# Patient Record
Sex: FEMALE | Race: WHITE | NOT HISPANIC OR LATINO | Employment: UNEMPLOYED | URBAN - METROPOLITAN AREA
[De-identification: names, ages, dates, MRNs, and addresses within clinical notes are randomized per-mention and may not be internally consistent; named-entity substitution may affect disease eponyms.]

---

## 2017-10-04 ENCOUNTER — HOSPITAL ENCOUNTER (OUTPATIENT)
Dept: PREADMISSION TESTING | Facility: HOSPITAL | Age: 57
Discharge: HOME | End: 2017-10-04
Attending: SURGERY
Payer: COMMERCIAL

## 2017-10-04 ENCOUNTER — HOSPITAL ENCOUNTER (OUTPATIENT)
Dept: RADIOLOGY | Facility: HOSPITAL | Age: 57
Discharge: HOME | End: 2017-10-04
Attending: SURGERY
Payer: COMMERCIAL

## 2017-10-04 VITALS — BODY MASS INDEX: 33.46 KG/M2 | WEIGHT: 196 LBS | HEIGHT: 64 IN

## 2017-10-04 DIAGNOSIS — K81.9 CHOLECYSTITIS: Primary | ICD-10-CM

## 2017-10-04 LAB
ALBUMIN SERPL BCP-MCNC: 3.7 G/DL
ALP SERPL-CCNC: 90 U/L
ALT SERPL W/O P-5'-P-CCNC: 46 U/L
ANION GAP SERPL CALC-SCNC: 8 MMOL/L
AST SERPL-CCNC: 34 U/L
BACTERIA #/AREA URNS HPF: NORMAL /HPF
BASOPHILS # BLD AUTO: 0 K/UL
BASOPHILS NFR BLD: 0.4 %
BILIRUB SERPL-MCNC: 0.4 MG/DL
BILIRUB UR QL STRIP: NEGATIVE
BUN SERPL-MCNC: 12 MG/DL
CALCIUM SERPL-MCNC: 9.3 MG/DL
CHLORIDE SERPL-SCNC: 105 MMOL/L
CLARITY UR: CLEAR
CO2 SERPL-SCNC: 26 MMOL/L
COLOR UR: YELLOW
CREAT SERPL-MCNC: 0.8 MG/DL
DIFFERENTIAL METHOD: ABNORMAL
EOSINOPHIL # BLD AUTO: 0.1 K/UL
EOSINOPHIL NFR BLD: 1 %
ERYTHROCYTE [DISTWIDTH] IN BLOOD BY AUTOMATED COUNT: 12.5 %
EST. GFR  (AFRICAN AMERICAN): >60 ML/MIN/1.73 M^2
EST. GFR  (NON AFRICAN AMERICAN): >60 ML/MIN/1.73 M^2
GLUCOSE SERPL-MCNC: 84 MG/DL
GLUCOSE UR QL STRIP: NEGATIVE
HCT VFR BLD AUTO: 42.4 %
HGB BLD-MCNC: 14.2 G/DL
HGB UR QL STRIP: NEGATIVE
KETONES UR QL STRIP: NEGATIVE
LEUKOCYTE ESTERASE UR QL STRIP: ABNORMAL
LYMPHOCYTES # BLD AUTO: 2.2 K/UL
LYMPHOCYTES NFR BLD: 32.8 %
MCH RBC QN AUTO: 28.5 PG
MCHC RBC AUTO-ENTMCNC: 33.4 G/DL
MCV RBC AUTO: 85 FL
MICROSCOPIC COMMENT: NORMAL
MONOCYTES # BLD AUTO: 0.5 K/UL
MONOCYTES NFR BLD: 7.2 %
NEUTROPHILS # BLD AUTO: 3.9 K/UL
NEUTROPHILS NFR BLD: 58.6 %
NITRITE UR QL STRIP: NEGATIVE
PH UR STRIP: 8 [PH] (ref 5–8)
PLATELET # BLD AUTO: 341 K/UL
PMV BLD AUTO: 7.5 FL
POTASSIUM SERPL-SCNC: 3.8 MMOL/L
PROT SERPL-MCNC: 7.9 G/DL
PROT UR QL STRIP: NEGATIVE
RBC # BLD AUTO: 4.98 M/UL
SODIUM SERPL-SCNC: 139 MMOL/L
SP GR UR STRIP: 1.01 (ref 1–1.03)
URN SPEC COLLECT METH UR: ABNORMAL
UROBILINOGEN UR STRIP-ACNC: NEGATIVE EU/DL
WBC # BLD AUTO: 6.6 K/UL
WBC #/AREA URNS HPF: 1 /HPF (ref 0–5)

## 2017-10-04 PROCEDURE — 93010 ELECTROCARDIOGRAM REPORT: CPT | Mod: ,,, | Performed by: INTERNAL MEDICINE

## 2017-10-04 PROCEDURE — 81000 URINALYSIS NONAUTO W/SCOPE: CPT

## 2017-10-04 PROCEDURE — 93005 ELECTROCARDIOGRAM TRACING: CPT

## 2017-10-04 PROCEDURE — 99900103 DSU ONLY-NO CHARGE-INITIAL HR (STAT)

## 2017-10-04 PROCEDURE — 85025 COMPLETE CBC W/AUTO DIFF WBC: CPT

## 2017-10-04 PROCEDURE — 36415 COLL VENOUS BLD VENIPUNCTURE: CPT

## 2017-10-04 PROCEDURE — 99900104 DSU ONLY-NO CHARGE-EA ADD'L HR (STAT)

## 2017-10-04 PROCEDURE — 71020 XR CHEST PA AND LATERAL PRE-OP: CPT | Mod: TC

## 2017-10-04 PROCEDURE — 71020 XR CHEST PA AND LATERAL PRE-OP: CPT | Mod: 26,,, | Performed by: RADIOLOGY

## 2017-10-04 PROCEDURE — 80053 COMPREHEN METABOLIC PANEL: CPT

## 2017-10-04 RX ORDER — ENOXAPARIN SODIUM 150 MG/ML
1 INJECTION SUBCUTANEOUS
COMMUNITY
End: 2020-03-16

## 2017-10-04 RX ORDER — PRAVASTATIN SODIUM 40 MG/1
40 TABLET ORAL DAILY
COMMUNITY
End: 2019-09-08 | Stop reason: SDUPTHER

## 2017-10-05 ENCOUNTER — ANESTHESIA EVENT (OUTPATIENT)
Dept: SURGERY | Facility: HOSPITAL | Age: 57
End: 2017-10-05
Payer: COMMERCIAL

## 2017-10-06 ENCOUNTER — ANESTHESIA (OUTPATIENT)
Dept: SURGERY | Facility: HOSPITAL | Age: 57
End: 2017-10-06
Payer: COMMERCIAL

## 2017-10-06 ENCOUNTER — HOSPITAL ENCOUNTER (OUTPATIENT)
Facility: HOSPITAL | Age: 57
Discharge: HOME | End: 2017-10-06
Attending: SURGERY | Admitting: SURGERY
Payer: COMMERCIAL

## 2017-10-06 ENCOUNTER — SURGERY (OUTPATIENT)
Age: 57
End: 2017-10-06

## 2017-10-06 DIAGNOSIS — K81.9 CHOLECYSTITIS: Primary | ICD-10-CM

## 2017-10-06 LAB
APTT BLDCRRT: 27 SEC
INR PPP: 1
PROTHROMBIN TIME: 10.1 SEC

## 2017-10-06 PROCEDURE — 99900103 DSU ONLY-NO CHARGE-INITIAL HR (STAT): Performed by: SURGERY

## 2017-10-06 PROCEDURE — 36000709 HC OR TIME LEV III EA ADD 15 MIN: Performed by: SURGERY

## 2017-10-06 PROCEDURE — 71000039 HC RECOVERY, EACH ADD'L HOUR: Performed by: SURGERY

## 2017-10-06 PROCEDURE — 25000003 PHARM REV CODE 250: Performed by: ANESTHESIOLOGY

## 2017-10-06 PROCEDURE — 36415 COLL VENOUS BLD VENIPUNCTURE: CPT

## 2017-10-06 PROCEDURE — 63600175 PHARM REV CODE 636 W HCPCS: Performed by: SURGERY

## 2017-10-06 PROCEDURE — 85610 PROTHROMBIN TIME: CPT

## 2017-10-06 PROCEDURE — 37000009 HC ANESTHESIA EA ADD 15 MINS: Performed by: SURGERY

## 2017-10-06 PROCEDURE — 47562 LAPAROSCOPIC CHOLECYSTECTOMY: CPT | Mod: 80,,, | Performed by: SURGERY

## 2017-10-06 PROCEDURE — 36000708 HC OR TIME LEV III 1ST 15 MIN: Performed by: SURGERY

## 2017-10-06 PROCEDURE — D9220A PRA ANESTHESIA: Mod: CRNA,,, | Performed by: NURSE ANESTHETIST, CERTIFIED REGISTERED

## 2017-10-06 PROCEDURE — 27201423 OPTIME MED/SURG SUP & DEVICES STERILE SUPPLY: Performed by: SURGERY

## 2017-10-06 PROCEDURE — D9220A PRA ANESTHESIA: Mod: ANES,,, | Performed by: ANESTHESIOLOGY

## 2017-10-06 PROCEDURE — 85730 THROMBOPLASTIN TIME PARTIAL: CPT

## 2017-10-06 PROCEDURE — 37000008 HC ANESTHESIA 1ST 15 MINUTES: Performed by: SURGERY

## 2017-10-06 PROCEDURE — 25000003 PHARM REV CODE 250: Performed by: NURSE ANESTHETIST, CERTIFIED REGISTERED

## 2017-10-06 PROCEDURE — 88304 TISSUE EXAM BY PATHOLOGIST: CPT | Performed by: PATHOLOGY

## 2017-10-06 PROCEDURE — 99900104 DSU ONLY-NO CHARGE-EA ADD'L HR (STAT): Performed by: SURGERY

## 2017-10-06 PROCEDURE — 71000015 HC POSTOP RECOV 1ST HR: Performed by: SURGERY

## 2017-10-06 PROCEDURE — 63600175 PHARM REV CODE 636 W HCPCS: Performed by: ANESTHESIOLOGY

## 2017-10-06 PROCEDURE — 63600175 PHARM REV CODE 636 W HCPCS: Performed by: NURSE ANESTHETIST, CERTIFIED REGISTERED

## 2017-10-06 PROCEDURE — 71000033 HC RECOVERY, INTIAL HOUR: Performed by: SURGERY

## 2017-10-06 RX ORDER — LIDOCAINE HCL/PF 100 MG/5ML
SYRINGE (ML) INTRAVENOUS
Status: DISCONTINUED | OUTPATIENT
Start: 2017-10-06 | End: 2017-10-06

## 2017-10-06 RX ORDER — FENTANYL CITRATE 50 UG/ML
25 INJECTION, SOLUTION INTRAMUSCULAR; INTRAVENOUS EVERY 5 MIN PRN
Status: COMPLETED | OUTPATIENT
Start: 2017-10-06 | End: 2017-10-06

## 2017-10-06 RX ORDER — HYDROMORPHONE HYDROCHLORIDE 2 MG/ML
0.2 INJECTION, SOLUTION INTRAMUSCULAR; INTRAVENOUS; SUBCUTANEOUS EVERY 5 MIN PRN
Status: DISCONTINUED | OUTPATIENT
Start: 2017-10-06 | End: 2017-10-06 | Stop reason: HOSPADM

## 2017-10-06 RX ORDER — LIDOCAINE HYDROCHLORIDE 10 MG/ML
1 INJECTION, SOLUTION EPIDURAL; INFILTRATION; INTRACAUDAL; PERINEURAL ONCE
Status: COMPLETED | OUTPATIENT
Start: 2017-10-06 | End: 2017-10-06

## 2017-10-06 RX ORDER — NEOSTIGMINE METHYLSULFATE 1 MG/ML
INJECTION, SOLUTION INTRAVENOUS
Status: DISCONTINUED | OUTPATIENT
Start: 2017-10-06 | End: 2017-10-06

## 2017-10-06 RX ORDER — GLYCOPYRROLATE 0.2 MG/ML
INJECTION INTRAMUSCULAR; INTRAVENOUS
Status: DISCONTINUED | OUTPATIENT
Start: 2017-10-06 | End: 2017-10-06

## 2017-10-06 RX ORDER — SODIUM CHLORIDE 0.9 % (FLUSH) 0.9 %
3 SYRINGE (ML) INJECTION
Status: DISCONTINUED | OUTPATIENT
Start: 2017-10-06 | End: 2017-10-06 | Stop reason: HOSPADM

## 2017-10-06 RX ORDER — ACETAMINOPHEN 10 MG/ML
INJECTION, SOLUTION INTRAVENOUS
Status: DISCONTINUED | OUTPATIENT
Start: 2017-10-06 | End: 2017-10-06

## 2017-10-06 RX ORDER — SODIUM CHLORIDE, SODIUM LACTATE, POTASSIUM CHLORIDE, CALCIUM CHLORIDE 600; 310; 30; 20 MG/100ML; MG/100ML; MG/100ML; MG/100ML
75 INJECTION, SOLUTION INTRAVENOUS CONTINUOUS
Status: DISCONTINUED | OUTPATIENT
Start: 2017-10-06 | End: 2017-10-06 | Stop reason: HOSPADM

## 2017-10-06 RX ORDER — ONDANSETRON 2 MG/ML
4 INJECTION INTRAMUSCULAR; INTRAVENOUS ONCE
Status: COMPLETED | OUTPATIENT
Start: 2017-10-06 | End: 2017-10-06

## 2017-10-06 RX ORDER — KETOROLAC TROMETHAMINE 30 MG/ML
INJECTION, SOLUTION INTRAMUSCULAR; INTRAVENOUS
Status: DISCONTINUED | OUTPATIENT
Start: 2017-10-06 | End: 2017-10-06

## 2017-10-06 RX ORDER — ONDANSETRON 2 MG/ML
INJECTION INTRAMUSCULAR; INTRAVENOUS
Status: DISCONTINUED | OUTPATIENT
Start: 2017-10-06 | End: 2017-10-06

## 2017-10-06 RX ORDER — DIPHENHYDRAMINE HYDROCHLORIDE 50 MG/ML
25 INJECTION INTRAMUSCULAR; INTRAVENOUS EVERY 6 HOURS PRN
Status: DISCONTINUED | OUTPATIENT
Start: 2017-10-06 | End: 2017-10-06 | Stop reason: HOSPADM

## 2017-10-06 RX ORDER — DEXAMETHASONE SODIUM PHOSPHATE 4 MG/ML
INJECTION, SOLUTION INTRA-ARTICULAR; INTRALESIONAL; INTRAMUSCULAR; INTRAVENOUS; SOFT TISSUE
Status: DISCONTINUED | OUTPATIENT
Start: 2017-10-06 | End: 2017-10-06

## 2017-10-06 RX ORDER — MIDAZOLAM HYDROCHLORIDE 1 MG/ML
INJECTION, SOLUTION INTRAMUSCULAR; INTRAVENOUS
Status: DISCONTINUED | OUTPATIENT
Start: 2017-10-06 | End: 2017-10-06

## 2017-10-06 RX ORDER — PROPOFOL 10 MG/ML
VIAL (ML) INTRAVENOUS
Status: DISCONTINUED | OUTPATIENT
Start: 2017-10-06 | End: 2017-10-06

## 2017-10-06 RX ORDER — MEPERIDINE HYDROCHLORIDE 25 MG/ML
12.5 INJECTION INTRAMUSCULAR; INTRAVENOUS; SUBCUTANEOUS ONCE AS NEEDED
Status: DISCONTINUED | OUTPATIENT
Start: 2017-10-06 | End: 2017-10-06 | Stop reason: HOSPADM

## 2017-10-06 RX ORDER — SODIUM CHLORIDE, SODIUM LACTATE, POTASSIUM CHLORIDE, CALCIUM CHLORIDE 600; 310; 30; 20 MG/100ML; MG/100ML; MG/100ML; MG/100ML
INJECTION, SOLUTION INTRAVENOUS CONTINUOUS
Status: DISCONTINUED | OUTPATIENT
Start: 2017-10-06 | End: 2017-10-06 | Stop reason: HOSPADM

## 2017-10-06 RX ORDER — KETAMINE HYDROCHLORIDE 100 MG/ML
INJECTION, SOLUTION INTRAMUSCULAR; INTRAVENOUS
Status: DISCONTINUED | OUTPATIENT
Start: 2017-10-06 | End: 2017-10-06

## 2017-10-06 RX ORDER — VECURONIUM BROMIDE FOR INJECTION 1 MG/ML
INJECTION, POWDER, LYOPHILIZED, FOR SOLUTION INTRAVENOUS
Status: DISCONTINUED | OUTPATIENT
Start: 2017-10-06 | End: 2017-10-06

## 2017-10-06 RX ORDER — OXYCODONE HYDROCHLORIDE 5 MG/1
5 TABLET ORAL ONCE AS NEEDED
Status: COMPLETED | OUTPATIENT
Start: 2017-10-07 | End: 2017-10-06

## 2017-10-06 RX ORDER — OXYCODONE HYDROCHLORIDE 5 MG/1
5 TABLET ORAL ONCE AS NEEDED
Status: DISCONTINUED | OUTPATIENT
Start: 2017-10-06 | End: 2017-10-06 | Stop reason: HOSPADM

## 2017-10-06 RX ORDER — FENTANYL CITRATE 50 UG/ML
INJECTION, SOLUTION INTRAMUSCULAR; INTRAVENOUS
Status: DISCONTINUED | OUTPATIENT
Start: 2017-10-06 | End: 2017-10-06

## 2017-10-06 RX ORDER — OXYCODONE HYDROCHLORIDE 5 MG/1
5 TABLET ORAL ONCE
Status: CANCELLED | OUTPATIENT
Start: 2017-10-06

## 2017-10-06 RX ADMIN — SODIUM CHLORIDE, SODIUM LACTATE, POTASSIUM CHLORIDE, AND CALCIUM CHLORIDE: .6; .31; .03; .02 INJECTION, SOLUTION INTRAVENOUS at 10:10

## 2017-10-06 RX ADMIN — GLYCOPYRROLATE 0.4 MG: 0.2 INJECTION, SOLUTION INTRAMUSCULAR; INTRAVENOUS at 01:10

## 2017-10-06 RX ADMIN — FENTANYL CITRATE 25 MCG: 50 INJECTION, SOLUTION INTRAMUSCULAR; INTRAVENOUS at 02:10

## 2017-10-06 RX ADMIN — DEXAMETHASONE SODIUM PHOSPHATE 8 MG: 4 INJECTION, SOLUTION INTRAMUSCULAR; INTRAVENOUS at 12:10

## 2017-10-06 RX ADMIN — SODIUM CHLORIDE, SODIUM LACTATE, POTASSIUM CHLORIDE, AND CALCIUM CHLORIDE: .6; .31; .03; .02 INJECTION, SOLUTION INTRAVENOUS at 01:10

## 2017-10-06 RX ADMIN — LIDOCAINE HYDROCHLORIDE 10 MG: 10 INJECTION, SOLUTION EPIDURAL; INFILTRATION; INTRACAUDAL; PERINEURAL at 09:10

## 2017-10-06 RX ADMIN — FENTANYL CITRATE 50 MCG: 50 INJECTION INTRAMUSCULAR; INTRAVENOUS at 12:10

## 2017-10-06 RX ADMIN — GLYCOPYRROLATE 0.2 MG: 0.2 INJECTION, SOLUTION INTRAMUSCULAR; INTRAVENOUS at 12:10

## 2017-10-06 RX ADMIN — MIDAZOLAM 2 MG: 1 INJECTION INTRAMUSCULAR; INTRAVENOUS at 12:10

## 2017-10-06 RX ADMIN — KETOROLAC TROMETHAMINE 30 MG: 30 INJECTION, SOLUTION INTRAMUSCULAR; INTRAVENOUS at 01:10

## 2017-10-06 RX ADMIN — PIPERACILLIN SODIUM AND TAZOBACTAM SODIUM 3.38 G: 3; .375 INJECTION, POWDER, LYOPHILIZED, FOR SOLUTION INTRAVENOUS at 12:10

## 2017-10-06 RX ADMIN — PROPOFOL 160 MG: 10 INJECTION, EMULSION INTRAVENOUS at 12:10

## 2017-10-06 RX ADMIN — LIDOCAINE HYDROCHLORIDE 100 MG: 20 INJECTION, SOLUTION INTRAVENOUS at 12:10

## 2017-10-06 RX ADMIN — KETAMINE HYDROCHLORIDE 30 MG: 100 INJECTION, SOLUTION, CONCENTRATE INTRAMUSCULAR; INTRAVENOUS at 12:10

## 2017-10-06 RX ADMIN — OXYCODONE HYDROCHLORIDE 5 MG: 5 TABLET ORAL at 02:10

## 2017-10-06 RX ADMIN — OXYCODONE HYDROCHLORIDE 5 MG: 5 TABLET ORAL at 03:10

## 2017-10-06 RX ADMIN — ONDANSETRON 4 MG: 2 INJECTION INTRAMUSCULAR; INTRAVENOUS at 02:10

## 2017-10-06 RX ADMIN — ONDANSETRON 4 MG: 2 INJECTION, SOLUTION INTRAMUSCULAR; INTRAVENOUS at 12:10

## 2017-10-06 RX ADMIN — ACETAMINOPHEN 1000 MG: 10 INJECTION, SOLUTION INTRAVENOUS at 12:10

## 2017-10-06 RX ADMIN — NEOSTIGMINE METHYLSULFATE 5 MG: 1 INJECTION INTRAVENOUS at 01:10

## 2017-10-06 RX ADMIN — VECURONIUM BROMIDE FOR INJECTION 8 MG: 1 INJECTION, POWDER, LYOPHILIZED, FOR SOLUTION INTRAVENOUS at 12:10

## 2017-10-06 NOTE — ANESTHESIA PREPROCEDURE EVALUATION
10/06/2017  Jessica Mitchell is a 57 y.o., female.    Anesthesia Evaluation    I have reviewed the Patient Summary Reports.    I have reviewed the Nursing Notes.   I have reviewed the Medications.     Review of Systems      Physical Exam  General:  Obesity    Airway/Jaw/Neck:  Airway Findings: Mouth Opening: Normal Tongue: Normal  General Airway Assessment: Adult, Good  Mallampati: II  Improves to II with phonation.  TM Distance: 4-6 cm      Dental:  Dental Findings: In tact   Chest/Lungs:  Chest/Lungs Findings: Clear to auscultation, Normal Respiratory Rate     Heart/Vascular:  Heart Findings: Rate: Normal  Rhythm: Regular Rhythm  Sounds: Normal  Heart murmur: negative       Mental Status:  Mental Status Findings:  Cooperative, Alert and Oriented         Anesthesia Plan  Type of Anesthesia, risks & benefits discussed:  Anesthesia Type:  general  Patient's Preference:   Intra-op Monitoring Plan: standard ASA monitors  Intra-op Monitoring Plan Comments:   Post Op Pain Control Plan:   Post Op Pain Control Plan Comments:   Induction:   IV  Beta Blocker:  Patient is not currently on a Beta-Blocker (No further documentation required).       Informed Consent: Patient understands risks and agrees with Anesthesia plan.  Questions answered. Anesthesia consent signed with patient.  ASA Score: 2     Day of Surgery Review of History & Physical: I have interviewed and examined the patient. I have reviewed the patient's H&P dated:  There are no significant changes.          Ready For Surgery From Anesthesia Perspective.

## 2017-10-06 NOTE — TRANSFER OF CARE
"Anesthesia Transfer of Care Note    Patient: Jessica Mitchell    Procedure(s) Performed: Procedure(s) (LRB):  CHOLECYSTECTOMY-LAPAROSCOPIC (N/A)    Patient location: PACU    Anesthesia Type: general    Transport from OR: Transported from OR on room air with adequate spontaneous ventilation    Post pain: adequate analgesia    Post assessment: no apparent anesthetic complications and tolerated procedure well    Post vital signs: stable    Level of consciousness: sedated    Nausea/Vomiting: no nausea/vomiting    Complications: none    Transfer of care protocol was followed      Last vitals:   Visit Vitals  /77 (BP Location: Left arm, Patient Position: Lying)   Pulse 76   Temp 36.8 °C (98.2 °F) (Temporal)   Resp 18   Ht 5' 4" (1.626 m)   Wt 88.9 kg (196 lb)   LMP 10/06/2012   SpO2 96%   Breastfeeding? No   BMI 33.64 kg/m²     "

## 2017-10-06 NOTE — DISCHARGE INSTRUCTIONS
"    Print                                                                      Discharge Instructions: After Your Surgery/Procedure    Youve just had surgery. During surgery you were given medicine called anesthesia to keep you relaxed and free of pain. After surgery you may have some pain or nausea. This is common. Here are some tips for feeling better and getting well after surgery.     Stay on schedule with your medication.   Going home  Your doctor or nurse will show you how to take care of yourself when you go home. He or she will also answer your questions. Have an adult family member or friend drive you home.      For your safety we recommend these precaution for the first 24 hours after your procedure:    · Do not drive or use heavy equipment.  · Do not make important decisions or sign legal papers.  · Do not drink alcohol.  · Have someone stay with you, if needed. He or she can watch for problems and help keep you safe.  · Your concentration, balance, coordination, and judgement may be impaired for many hours after anesthesia.  Use caution when ambulating or standing up.     · You may feel weak and "washed out" after anesthesia and surgery.      Subtle residual effects of general anesthesia or sedation with regional / local anesthesia can last more than 24 hours.  Rest for the remainder of the day or longer if your Doctor/Surgeon has advised you to do so.  Although you may feel normal within the first 24 hours, your reflexes and mental ability may be impaired without you realizing it.  You may feel dizzy, lightheaded or sleepy for 24 hours or longer.      Be sure to go to all follow-up visits with your doctor. And rest after your surgery for as long as your doctor tells you to.  Coping with pain  If you have pain after surgery, pain medicine will help you feel better. Take it as told, before pain becomes severe. Also, ask your doctor or pharmacist about other ways to control pain. This might be with heat, " ice, or relaxation. And follow any other instructions your surgeon or nurse gives you.    Tips for taking pain medicine  To get the best relief possible, remember these points:  · Pain medicines can upset your stomach. Taking them with a little food may help.  · Most pain relievers taken by mouth need at least 20 to 30 minutes to start to work.  · Taking medicine on a schedule can help you remember to take it. Try to time your medicine so that you can take it before starting an activity. This might be before you get dressed, go for a walk, or sit down for dinner.  · Constipation is a common side effect of pain medicines. Call your doctor before taking any medicines such as laxatives or stool softeners to help ease constipation. Also ask if you should skip any foods. Drinking lots of fluids and eating foods such as fruits and vegetables that are high in fiber can also help. Remember, do not take laxatives unless your surgeon has prescribed them.  · Drinking alcohol and taking pain medicine can cause dizziness and slow your breathing. It can even be deadly. Do not drink alcohol while taking pain medicine.  · Pain medicine can make you react more slowly to things. Do not drive or run machinery while taking pain medicine.  Your health care provider may tell you to take acetaminophen to help ease your pain. Ask him or her how much you are supposed to take each day. Acetaminophen or other pain relievers may interact with your prescription medicines or other over-the-counter (OTC) drugs. Some prescription medicines have acetaminophen and other ingredients. Using both prescription and OTC acetaminophen for pain can cause you to overdose. Read the labels on your OTC medicines with care. This will help you to clearly know the list of ingredients, how much to take, and any warnings. It may also help you not take too much acetaminophen. If you have questions or do not understand the information, ask your pharmacist or health  care provider to explain it to you before you take the OTC medicine.  Managing nausea  Some people have an upset stomach after surgery. This is often because of anesthesia, pain, or pain medicine, or the stress of surgery. These tips will help you handle nausea and eat healthy foods as you get better. If you were on a special food plan before surgery, ask your doctor if you should follow it while you get better. These tips may help:  · Do not push yourself to eat. Your body will tell you when to eat and how much.  · Start off with clear liquids and soup. They are easier to digest.  · Next try semi-solid foods, such as mashed potatoes, applesauce, and gelatin, as you feel ready.  · Slowly move to solid foods. Dont eat fatty, rich, or spicy foods at first.  · Do not force yourself to have 3 large meals a day. Instead eat smaller amounts more often.  · Take pain medicines with a small amount of solid food, such as crackers or toast, to avoid nausea.     Call your surgeon if  · You still have pain an hour after taking medicine. The medicine may not be strong enough.  · You feel too sleepy, dizzy, or groggy. The medicine may be too strong.  · You have side effects like nausea, vomiting, or skin changes, such as rash, itching, or hives.       If you have obstructive sleep apnea  You were given anesthesia medicine during surgery to keep you comfortable and free of pain. After surgery, you may have more apnea spells because of this medicine and other medicines you were given. The spells may last longer than usual.   At home:  · Keep using the continuous positive airway pressure (CPAP) device when you sleep. Unless your health care provider tells you not to, use it when you sleep, day or night. CPAP is a common device used to treat obstructive sleep apnea.  · Talk with your provider before taking any pain medicine, muscle relaxants, or sedatives. Your provider will tell you about the possible dangers of taking these  medicines.  © 5327-7989 [employer]. [address] [place] [address], [place] [address]. All rights reserved. This information is not intended as a substitute for professional medical care. Always follow your healthcare professional's instructions.    General Information:    1.  Do not drink alcoholic beverages including beer for 24 hours or as long as you are on pain medication..  2.  Do not drive a motor vehicle, operate machinery or power tools, or signs legal papers for 24 hours or as long as you are on pain medication.   3.  You may experience light-headedness, dizziness, and sleepiness following surgery. Please do not stay alone. A responsible adult should be with you for this 24 hour period.  4.  Go home and rest.    5. Progress slowly to a normal diet unless instructed.  Otherwise, begin with liquids such as soft drinks, then soup and crackers working up to solid foods. Drink plenty of nonalcoholic fluids.  6.  Certain anesthetics and pain medications produce nausea and vomiting in certain       individuals. If nausea becomes a problem at home, call you doctor.    7. A nurse will be calling you sometime after surgery. Do not be alarmed. This is our way of finding out how you are doing.    8. Several times every hour while you are awake, take 2-3 deep breaths and cough. If you had stomach surgery hold a pillow or rolled towel firmly against your stomach before you cough. This will help with any pain the cough might cause.  9. Several times every hour while you are awake, pump and flex your feet 5-6 times and do foot circles. This will help prevent blood clots.    10.Call your doctor for severe pain, bleeding, fever, or signs or symptoms of infection (pain, swelling, redness, foul odor, drainage).    Using Opioids for Pain Management     Your doctor has given instructions for you to take an opioid.  This is a drug for bad pain.  It helps control pain without causing bleeding and kidney problems.  Common  opioid names are morphine, hydromorphone, oxycodone, and methadone. These drugs are called narcotics.    There are several safety concerns you need to know.     · It is against the law to give or sell this drug to another person.  You must keep this medicine safely locked.    · You may have side effects from taking this medication.  These include nausea, itching, sweating, sleepiness, a change in your ability to breathe, and depression.  · Do not take alcohol or sleeping pills opioids.    · Long-term opoid use may no longer giver you relief from pain.  It can cause you stomach pain, mental anxiety, and headaches.  Long-term opoid use can potentially lead to unlawful street drug abuse and reduce your ability to stay employed.    · Your body may become opioid tolerant if you need to take more to get relief.    · You must stop taking opioids if you begin having more pain as a result of the medicine.    · Opioid withdrawal occurs when you have to stop taking the drug.  It can cause you to have nausea, vomiting, diarrhea, stomach pain, anxiety, and dilated pupils in your eyes. This condition means you are opioid dependent.    · Addiction is a drug induced brain disease. It means there are changes in how your brain is working.  Children, teens, and young adults under 25 years old are more likely to get addicted to opioids.      · Addiction can happen with repeated opioid use.  It does not happen with short-term use of two weeks or less.      For more information, please speak with your doctor or pharmacist.        Post op instructions for prevention of DVT  What is deep vein thrombosis?  Deep vein thrombosis (DVT) is the medical term for blood clots in the deep veins of the leg.  These blood clots can be dangerous.  A DVT can block a blood vessel and keep blood from getting where it needs to go.  Another problem is that the clot can travel to other parts of the body such as the lungs.  A clot that travels to the lungs is  called a pulmonary embolus (PE) and can cause serious problems with breathing which can lead to death.  Am I at risk for DVT/PE?  If you are not very active, you are at risk of DVT.  Anyone confined to bed, sitting for long periods of time, recovering from surgery, etc. increases the risk of DVT.  Other risk factors are cancer diagnosis, certain medications, estrogen replacement in any form,older age, obesity, pregnancy, smoking, history of clotting disorders, and dehydration.  How will I know if I have a DVT?   Swelling in the lower leg   Pain   Warmth, redness, hardness or bulging of the vein  If you have any of these symptoms, call your doctors office right away.  Some people will not have any symptoms until the clot moves to the lungs.  What are the symptoms of a PE?   Panting, shortness of breath, or trouble breathing   Sharp, knife-like chest pain when you breathe   Coughing or coughing up blood   Rapid heartbeat  If you have any of these symptoms or get worse quickly, call 911 for emergency treatment.  How can I prevent a DVT?   Avoid long periods of inactivity and dont cross your legs--get up and walk around every hour or so.   Stay active--walking after surgery is highly encouraged.  This means you should get out of the house and walk in the neighborhood.  Going up and down stairs will not impair healing (unless advised against such activity by your doctor).     Drink plenty of noncaffeinated, nonalcoholic fluids each day to prevent dehydration.   Wear special support stockings, if they have been advised by your doctor.   If you travel, stop at least once an hour and walk around.   Avoid smoking (assistance with stopping is available through your healthcare provider)      Discharge Instructions for Laparoscopic Cholecystectomy  You have had a procedure known as a laparoscopic cholecystectomy. A laparoscopic cholecystectomy is a procedure to remove your gallbladder. People who have this  procedure usually recover more quickly and have less pain than with open gallbladder surgery (called open cholecystectomy). Many surgeons recommend a low-fat diet, avoiding fried food in particular, for the first month after surgery.   You can live a full and healthy life without your gallbladder. This includes eating the foods and doing the things you enjoyed before your gallbladder problems started.  Home care  Recommendations for home care include the following:   · Ask someone to drive you to your appointments for the next 3 days. Dont drive until you are no longer taking pain medicine and are able to step on the brake pedal without hesitation.   · Wash the skin around your incision daily with mild soap and water. It's OK to shower the day after your surgery.  · Eat your regular diet. It is wise to stay away from rich, greasy, or spicy food for a few days.  · Remember, it takes at least 1 week for you to get most of your strength and energy back.  · Make an office visit to talk to your healthcare provider if the following symptoms dont go away within a week after your surgery:  ¨ Fatigue  ¨ Pain around the incision  ¨ Diarrhea or constipation  ¨ Loss of appetite     When to call your healthcare provider  Call your healthcare provider immediately if you have any of the following:  · Yellowing of your eyes or skin (jaundice)  · Chills  · Fever of 100.4°F (38.0°C) or higher, or as directed by your healthcare provider   · Redness, swelling, increasing pain, pus, or a foul smell at the incision site  · Dark or rust-colored urine  · Stool that is marley-colored or light in color instead of brown  · Increasing belly pain  · Rectal bleeding  · Leg swelling or shortness of breath   Date Last Reviewed: 7/1/2016  © 0028-9764 [employer]. [address] [place] [address], [place] [address]. All rights reserved. This information is not intended as a substitute for professional medical care. Always follow your healthcare  professional's instructions.        Always notify your doctor if you are not able to follow the post operative instructions that are given to you at the time of discharge.  It may be necessary to prescribe one of the medications available to prevent DVT.We hope your stay was comfortable as you heal now, mend and rest.    For we have enjoyed taking care of you by giving your our best.    And as you get better, by regaining your health and strength;   We count it as a privilege to have served you and hope your time at [facility] was well spent.      Thank  You!!!

## 2017-10-06 NOTE — ANESTHESIA POSTPROCEDURE EVALUATION
"Anesthesia Post Evaluation    Patient: Jessica Mitchell    Procedure(s) Performed: Procedure(s) (LRB):  CHOLECYSTECTOMY-LAPAROSCOPIC (N/A)    Final Anesthesia Type: general  Patient location during evaluation: PACU  Patient participation: Yes- Able to Participate  Level of consciousness: awake and alert and oriented  Post-procedure vital signs: reviewed and stable  Pain management: adequate  Airway patency: patent  PONV status at discharge: No PONV  Anesthetic complications: no      Cardiovascular status: blood pressure returned to baseline  Respiratory status: unassisted, spontaneous ventilation and room air  Hydration status: euvolemic  Follow-up not needed.        Visit Vitals  BP (!) 140/75   Pulse 72   Temp 36.5 °C (97.7 °F) (Temporal)   Resp 16   Ht 5' 4" (1.626 m)   Wt 88.9 kg (196 lb)   LMP 10/06/2012   SpO2 (!) 93%   Breastfeeding? No   BMI 33.64 kg/m²       Pain/Greg Score: Pain Assessment Performed: Yes (10/6/2017  2:00 PM)  Presence of Pain: non-verbal indicators absent (10/6/2017  2:00 PM)  Pain Rating Prior to Med Admin: 9 (10/6/2017  2:25 PM)  Greg Score: 5 (10/6/2017  2:00 PM)      "

## 2017-10-06 NOTE — OR NURSING
Pt tolerated procedure well. Pt states no complaints. Pt and family given discharge instructions with verbalized understanding. Pt escorted via w/c to car.

## 2017-10-06 NOTE — BRIEF OP NOTE
[facility]  Brief Operative Note     SUMMARY     Surgery Date: 10/6/2017     Surgeon(s) and Role:     * Lubna Roland MD - Primary     * Mariel Nagel MD - Assisting        Pre-op Diagnosis:  Cholecystitis [K81.9]    Post-op Diagnosis:  Post-Op Diagnosis Codes:     * Biliary colic [K80.50]    Procedure(s) (LRB):  CHOLECYSTECTOMY-LAPAROSCOPIC (N/A)    Anesthesia: General    Description of the findings of the procedure: Adhesions of omentum to GB    Findings/Key Components: SEE ABOVE    Estimated Blood Loss: * No values recorded between 10/6/2017 12:51 PM and 10/6/2017  1:56 PM *         Specimens:   Specimen (12h ago through future)    Start     Ordered    10/06/17 1309  Specimen to Pathology - Surgery  Once     Comments:  Pre-op Diagnosis: Cholecystitis [K81.9]Post-op Diagnosis: sameProcedure(s):CHOLECYSTECTOMY-LAPAROSCOPIC Number of specimens: 1Name of specimens: gallbladder      10/06/17 1309          Discharge Note    SUMMARY     Admit Date: 10/6/2017    Discharge Date and Time:  10/06/2017 1:57 PM    Hospital Course (synopsis of major diagnoses, care, treatment, and services provided during the course of the hospital stay): **Uneventful*     Final Diagnosis: Post-Op Diagnosis Codes:     * Biliary colic [K80.50]    Disposition: Home or Self Care    Follow Up/Patient Instructions:     Medications:  Reconciled Home Medications:   Current Discharge Medication List      CONTINUE these medications which have NOT CHANGED    Details   amlodipine (NORVASC) 5 MG tablet Take 5 mg by mouth once daily.      cyclobenzaprine (FLEXERIL) 10 MG tablet Take 10 mg by mouth 3 (three) times daily as needed for Muscle spasms.      enoxaparin (LOVENOX) 150 mg/mL Syrg Inject 1 mg/kg into the skin every 12 (twelve) hours.      lorazepam (ATIVAN) 1 MG tablet Take 1 mg by mouth every 12 (twelve) hours as needed for Anxiety.      pravastatin (PRAVACHOL) 40 MG tablet Take 40 mg by mouth once daily.      ranitidine  (ZANTAC) 300 MG tablet Take 300 mg by mouth once daily.      warfarin (COUMADIN) 2 MG tablet Take 9 mg by mouth Daily.          STOP taking these medications       tramadol (ULTRAM) 50 mg tablet Comments:   Reason for Stopping:         econazole nitrate 1 % cream Comments:   Reason for Stopping:         triamcinolone acetonide 0.1% (KENALOG) 0.1 % cream Comments:   Reason for Stopping:               Discharge Procedure Orders  Diet general     Lifting restrictions   Order Comments: No heavy lifting for 3 weeks     Call MD for:  temperature >100.4     Call MD for:  persistent nausea and vomiting or diarrhea     Call MD for:  severe uncontrolled pain     Call MD for:  redness, tenderness, or signs of infection (pain, swelling, redness, odor or green/yellow discharge around incision site)     Remove dressing in 48 hours       Follow-up Information     Lubna Roland MD. Schedule an appointment as soon as possible for a visit in 1 week.    Specialty:  General Surgery  Why:  For wound re-check  Contact information:  [address]  [facility] [id number] [phone]

## 2017-10-06 NOTE — OP NOTE
DATE OF PROCEDURE:  10/06/2017    PREOPERATIVE DIAGNOSES:  1.  Cholelithiasis.  2.  Biliary colic.    POSTOPERATIVE DIAGNOSES:  1.  Cholelithiasis.  2.  Biliary colic.  3.  Intra-abdominal adhesions.    PROCEDURES:  1.  Laparoscopic cholecystectomy.  2.  Intra-abdominal adhesiolysis.    SURGEON:  Dr. Roland.    FIRST ASSISTANT:  Robe.    ANESTHESIA:  General endotracheal.    ESTIMATED BLOOD LOSS:  5 mL    DRAINS:  None.    COMPLICATIONS:  None.    PROCEDURE IN DETAIL:  The patient was brought to the Operating Room where she   was placed on the operating room table in the supine position.  Following   general endotracheal anesthesia, the patient's abdomen was prepped and draped in   sterile fashion using chlorhexidine prep.  A transverse supraumbilical incision   was made and carried down to the subcutaneous tissue.  Bleeding was controlled   by use of electrocautery.  The S retractors were used to separate the   subcutaneous tissue down to the fascia and the fascia was incised.  Stay sutures   using 0 Vicryl were placed on the superior and inferior fascial edges.  With   upward retraction, the peritoneum was grasped and entered.  The Emma trocar   was then placed and the abdomen was insufflated to approximately 4 liters.  The   scope was then advanced and the tip of the gallbladder could be identified.  She   was then placed in the reverse Trendelenburg position and rotated to her left.    The 2 lateral 5 mm trocars were placed under direct visualization with no   bleeding.  The gallbladder was grasped and upper traction was applied.  She had   adhesions to the gallbladder and these will be addressed later.  Following this,   the upper 10 mm trocar was placed after positioning with the spinal needle.    The dolphin nose dissector and electrocautery was then used to take down the   adhesions of the omentum on the anterior surface of the gallbladder.  The   inferior portion of the gallbladder was then grasped  with the second grasper and   upward traction applied.  The cystic duct was cleared and down to the   bifurcation and it was then clipped twice distally, once proximally and   transected.  The cystic artery was then clipped twice distally, once proximally   and transected.  Then, the gallbladder was removed from the liver bed using the   spatula and electrocautery.  It was then placed in an EndoCatch bag and brought   out through the upper 10 mm trocar site.  Following this, the liver bed was   elevated and a few areas that were oozing were cauterized using the spatula.    The pressure was then dropped to 7 and there was no bleeding or bile leakage   from the liver bed.  Two pieces of Surgicel were then placed in the liver bed   and the omentum fell back into the gallbladder fossa.  Following this, the   trocars were removed under direct visualization with no bleeding from any of the   trocars.  The two 10 mm trocar sites, the fascia was closed with interrupted 0   Vicryl sutures.  The skin of all 4 incisions was then closed with running 4-0   Monocryl subcuticular suture.  All counts were reported as correct x2 prior to   closing the incisions.  An abdominal binder was placed on the patient and the   patient was awakened from anesthesia and taken to the Recovery Room in stable   condition.      BINDU  dd: 10/06/2017 14:03:05 (CDT)  td: 10/06/2017 15:03:50 (CDRONNIE)  Doc ID   #[id number]  Job ID #[id number]    CC:

## 2017-10-09 VITALS
WEIGHT: 196 LBS | RESPIRATION RATE: 18 BRPM | TEMPERATURE: 98 F | DIASTOLIC BLOOD PRESSURE: 74 MMHG | SYSTOLIC BLOOD PRESSURE: 136 MMHG | OXYGEN SATURATION: 93 % | HEART RATE: 81 BPM | HEIGHT: 64 IN | BODY MASS INDEX: 33.46 KG/M2

## 2019-09-07 DIAGNOSIS — E78.5 HYPERLIPIDEMIA: Primary | ICD-10-CM

## 2019-09-08 RX ORDER — PRAVASTATIN SODIUM 40 MG/1
40 TABLET ORAL DAILY
Qty: 30 TABLET | Refills: 0 | Status: SHIPPED | OUTPATIENT
Start: 2019-09-08 | End: 2019-09-24 | Stop reason: SDUPTHER

## 2019-09-11 DIAGNOSIS — M54.31 SCIATICA OF RIGHT SIDE: Primary | ICD-10-CM

## 2019-09-11 RX ORDER — TRAMADOL HYDROCHLORIDE 50 MG/1
50 TABLET ORAL EVERY 6 HOURS
COMMUNITY
End: 2019-09-11 | Stop reason: SDUPTHER

## 2019-09-11 RX ORDER — TRAMADOL HYDROCHLORIDE 50 MG/1
50 TABLET ORAL EVERY 6 HOURS
Qty: 30 TABLET | Refills: 0 | Status: SHIPPED | OUTPATIENT
Start: 2019-09-11 | End: 2019-09-24 | Stop reason: SDUPTHER

## 2019-09-11 NOTE — TELEPHONE ENCOUNTER
----- Message from Akila De La Cruz sent at 9/11/2019  1:00 PM CDT -----  - pt needs refill on tramadol   [facility] [address]  [phone]

## 2019-09-17 ENCOUNTER — TELEPHONE (OUTPATIENT)
Dept: FAMILY MEDICINE | Facility: CLINIC | Age: 59
End: 2019-09-17

## 2019-09-17 NOTE — TELEPHONE ENCOUNTER
----- Message from Akila De La Cruz sent at 9/17/2019 11:53 AM CDT -----  - pt would like an /s set up to check on her thyroid nodules. She is having throat trouble.   [phone]

## 2019-09-24 ENCOUNTER — OFFICE VISIT (OUTPATIENT)
Dept: FAMILY MEDICINE | Facility: CLINIC | Age: 59
End: 2019-09-24
Payer: COMMERCIAL

## 2019-09-24 VITALS
SYSTOLIC BLOOD PRESSURE: 144 MMHG | WEIGHT: 206 LBS | HEIGHT: 64 IN | DIASTOLIC BLOOD PRESSURE: 78 MMHG | BODY MASS INDEX: 35.17 KG/M2 | HEART RATE: 72 BPM

## 2019-09-24 DIAGNOSIS — E04.1 THYROID NODULE: ICD-10-CM

## 2019-09-24 DIAGNOSIS — K21.9 GASTROESOPHAGEAL REFLUX DISEASE, ESOPHAGITIS PRESENCE NOT SPECIFIED: ICD-10-CM

## 2019-09-24 DIAGNOSIS — J02.9 PHARYNGITIS, UNSPECIFIED ETIOLOGY: ICD-10-CM

## 2019-09-24 DIAGNOSIS — Z23 NEED FOR INFLUENZA VACCINATION: ICD-10-CM

## 2019-09-24 DIAGNOSIS — B37.9 CANDIDIASIS: ICD-10-CM

## 2019-09-24 DIAGNOSIS — H65.03 BILATERAL ACUTE SEROUS OTITIS MEDIA, RECURRENCE NOT SPECIFIED: ICD-10-CM

## 2019-09-24 DIAGNOSIS — E78.2 MIXED HYPERLIPIDEMIA: ICD-10-CM

## 2019-09-24 DIAGNOSIS — M54.31 SCIATICA OF RIGHT SIDE: ICD-10-CM

## 2019-09-24 DIAGNOSIS — I10 HYPERTENSION, UNSPECIFIED TYPE: Primary | ICD-10-CM

## 2019-09-24 PROCEDURE — 99214 PR OFFICE/OUTPT VISIT, EST, LEVL IV, 30-39 MIN: ICD-10-PCS | Mod: 25,S$GLB,, | Performed by: NURSE PRACTITIONER

## 2019-09-24 PROCEDURE — 90682 FLU VACCINE - QUADRIVALENT (RECOMBINANT) PRESERVATIVE FREE: ICD-10-PCS | Mod: S$GLB,,, | Performed by: NURSE PRACTITIONER

## 2019-09-24 PROCEDURE — 90471 IMMUNIZATION ADMIN: CPT | Mod: S$GLB,,, | Performed by: NURSE PRACTITIONER

## 2019-09-24 PROCEDURE — 99214 OFFICE O/P EST MOD 30 MIN: CPT | Mod: 25,S$GLB,, | Performed by: NURSE PRACTITIONER

## 2019-09-24 PROCEDURE — 90682 RIV4 VACC RECOMBINANT DNA IM: CPT | Mod: S$GLB,,, | Performed by: NURSE PRACTITIONER

## 2019-09-24 PROCEDURE — 90471 FLU VACCINE - QUADRIVALENT (RECOMBINANT) PRESERVATIVE FREE: ICD-10-PCS | Mod: S$GLB,,, | Performed by: NURSE PRACTITIONER

## 2019-09-24 RX ORDER — CEPHALEXIN 500 MG/1
500 CAPSULE ORAL EVERY 8 HOURS
Qty: 21 CAPSULE | Refills: 0 | Status: SHIPPED | OUTPATIENT
Start: 2019-09-24 | End: 2019-10-01

## 2019-09-24 RX ORDER — PRAVASTATIN SODIUM 40 MG/1
40 TABLET ORAL DAILY
Qty: 30 TABLET | Refills: 0 | Status: SHIPPED | OUTPATIENT
Start: 2019-09-24 | End: 2019-11-13 | Stop reason: SDUPTHER

## 2019-09-24 RX ORDER — TRAMADOL HYDROCHLORIDE 50 MG/1
50 TABLET ORAL EVERY 12 HOURS PRN
Qty: 60 TABLET | Refills: 0 | Status: SHIPPED | OUTPATIENT
Start: 2019-09-24 | End: 2019-10-24

## 2019-09-24 RX ORDER — FLUCONAZOLE 100 MG/1
100 TABLET ORAL DAILY
Qty: 3 TABLET | Refills: 0 | Status: SHIPPED | OUTPATIENT
Start: 2019-09-24 | End: 2019-09-27

## 2019-09-24 RX ORDER — AMLODIPINE BESYLATE 5 MG/1
5 TABLET ORAL DAILY
Qty: 90 TABLET | Refills: 1 | Status: SHIPPED | OUTPATIENT
Start: 2019-09-24 | End: 2020-05-26 | Stop reason: SDUPTHER

## 2019-09-24 RX ORDER — DULOXETIN HYDROCHLORIDE 30 MG/1
CAPSULE, DELAYED RELEASE ORAL
Refills: 3 | COMMUNITY
Start: 2019-09-19 | End: 2020-03-02 | Stop reason: SDUPTHER

## 2019-09-24 RX ORDER — APIXABAN 5 MG/1
1 TABLET, FILM COATED ORAL 2 TIMES DAILY
Refills: 5 | COMMUNITY
Start: 2019-09-02 | End: 2019-11-30 | Stop reason: SDUPTHER

## 2019-09-24 NOTE — PROGRESS NOTES
Patient ID: Jessica Mitchell is a 59 y.o. female.    Chief Complaint: Sore Throat; Cough; and Thyroid Problem (maybe the nodules)    HPI  Presents with complaints of sore throat that started about 10 days ago. Does not seem to be getting better. Pain is constant worse with swallowing. Has been taking pain meds to help with pain. Does suffer from gerd. Mild congestion. No fever. No cough. But does have some ear pressure. No longer on coumadin. Currently taking eliquis. Had thyroid ultrasound in 2017 which showed  2 thyroid nodules.       Past Medical History:   Diagnosis Date    Adrenal tumor     DVT (deep venous thrombosis) 2012    Hiatal hernia     HTN (hypertension)     Multiple thyroid nodules     two    Stomach ulcer     Stroke     at 31 years old     Past Surgical History:   Procedure Laterality Date    Bilatweral Tubal ligation      ESOPHAGEAL DILATION           Tobacco History:  reports that she has quit smoking. She has never used smokeless tobacco.      Review of patient's allergies indicates:   Allergen Reactions    Codeine Itching    Lisinopril Other (See Comments)     cough       Current Outpatient Medications:     amLODIPine (NORVASC) 5 MG tablet, Take 1 tablet (5 mg total) by mouth once daily., Disp: 90 tablet, Rfl: 1    cyclobenzaprine (FLEXERIL) 10 MG tablet, Take 10 mg by mouth 3 (three) times daily as needed for Muscle spasms., Disp: , Rfl:     DULoxetine (CYMBALTA) 30 MG capsule, TAKE ONE CAPSULE BY MOUTH ONCE DAILY FOR 2 WEEKS THEN INCREASE TO 60MG (2 CAPSULES) DAILY IF TOLERATED, Disp: , Rfl: 3    ELIQUIS 5 mg Tab, Take 1 tablet by mouth 2 (two) times daily., Disp: , Rfl: 5    enoxaparin (LOVENOX) 150 mg/mL Syrg, Inject 1 mg/kg into the skin every 12 (twelve) hours., Disp: , Rfl:     pravastatin (PRAVACHOL) 40 MG tablet, Take 1 tablet (40 mg total) by mouth once daily., Disp: 30 tablet, Rfl: 0    ranitidine (ZANTAC) 300 MG tablet, Take 300 mg by mouth once daily., Disp: , Rfl:  "    traMADol (ULTRAM) 50 mg tablet, Take 1 tablet (50 mg total) by mouth every 12 (twelve) hours as needed for Pain., Disp: 60 tablet, Rfl: 0    cephALEXin (KEFLEX) 500 MG capsule, Take 1 capsule (500 mg total) by mouth every 8 (eight) hours. for 7 days, Disp: 21 capsule, Rfl: 0    fluconazole (DIFLUCAN) 100 MG tablet, Take 1 tablet (100 mg total) by mouth once daily. for 3 days, Disp: 3 tablet, Rfl: 0    lorazepam (ATIVAN) 1 MG tablet, Take 1 mg by mouth every 12 (twelve) hours as needed for Anxiety., Disp: , Rfl:     warfarin (COUMADIN) 2 MG tablet, Take 9 mg by mouth Daily. , Disp: , Rfl:     Review of Systems   Constitutional: Negative for chills, fever and unexpected weight change.   HENT: Positive for sore throat. Negative for ear pain and rhinorrhea.    Eyes: Negative for pain and visual disturbance.   Respiratory: Negative for cough and shortness of breath.    Cardiovascular: Negative for chest pain, palpitations and leg swelling.   Gastrointestinal: Negative for abdominal pain, diarrhea, nausea and vomiting.   Genitourinary: Negative for difficulty urinating, hematuria and vaginal bleeding.   Musculoskeletal: Negative for arthralgias.   Skin: Negative for rash.   Neurological: Negative for dizziness, weakness and headaches.   Psychiatric/Behavioral: Negative for agitation and sleep disturbance. The patient is not nervous/anxious.           Objective:      Vitals:    09/24/19 1048   BP: (!) 144/78   Pulse: 72   Weight: 93.4 kg (206 lb)   Height: 5' 3.5" (1.613 m)     Physical Exam   Constitutional: She is oriented to person, place, and time. She appears well-developed and well-nourished.   HENT:   Head: Normocephalic.   Right Ear: External ear normal.   Left Ear: External ear normal.   Mouth/Throat: Oropharynx is clear and moist.   Erythematous   White patches to tongue and posterior pharynx   Eyes: Pupils are equal, round, and reactive to light. Conjunctivae are normal.   Neck: Normal range of motion. " Neck supple. No JVD present. Thyroid mass (nodules palpated) present.   Cardiovascular: Normal rate and regular rhythm.   No murmur heard.  Pulmonary/Chest: Effort normal and breath sounds normal.   Abdominal: Soft. Bowel sounds are normal. There is no tenderness.   Musculoskeletal: Normal range of motion. She exhibits no edema or deformity.   Lymphadenopathy:     She has no cervical adenopathy.   Neurological: She is alert and oriented to person, place, and time. Gait normal.   Skin: Skin is warm, dry and intact. No rash noted.   Psychiatric: She has a normal mood and affect. Her speech is normal and behavior is normal.         Assessment:       1. Hypertension, unspecified type    2. Hyperlipidemia    3. Thyroid nodule    4. Need for influenza vaccination    5. Pharyngitis, unspecified etiology    6. i    7. Bilateral acute serous otitis media, recurrence not specified    8. Candidiasis    9. Sciatica of right side           Plan:       Hypertension, unspecified type  -     amLODIPine (NORVASC) 5 MG tablet; Take 1 tablet (5 mg total) by mouth once daily.  Dispense: 90 tablet; Refill: 1    Hyperlipidemia  -     pravastatin (PRAVACHOL) 40 MG tablet; Take 1 tablet (40 mg total) by mouth once daily.  Dispense: 30 tablet; Refill: 0    Thyroid nodule  -     US Thyroid; Future; Expected date: 09/24/2019    Need for influenza vaccination  -     Influenza - Quadrivalent (Recombinant) (PF)    Pharyngitis, unspecified etiology  -     cephALEXin (KEFLEX) 500 MG capsule; Take 1 capsule (500 mg total) by mouth every 8 (eight) hours. for 7 days  Dispense: 21 capsule; Refill: 0    i  Comments:  inrease zantac to 300mg bid x 5 days    Bilateral acute serous otitis media, recurrence not specified    Candidiasis  -     fluconazole (DIFLUCAN) 100 MG tablet; Take 1 tablet (100 mg total) by mouth once daily. for 3 days  Dispense: 3 tablet; Refill: 0    Sciatica of right side  -     traMADol (ULTRAM) 50 mg tablet; Take 1 tablet (50 mg  total) by mouth every 12 (twelve) hours as needed for Pain.  Dispense: 60 tablet; Refill: 0      No follow-ups on file.        9/24/2019 Angi Barrett NP

## 2019-09-24 NOTE — PATIENT INSTRUCTIONS
Viral Pharyngitis (Sore Throat)    You (or your child, if your child is the patient) have pharyngitis (sore throat). This infection is caused by a virus. It can cause throat pain that is worse when swallowing, aching all over, headache, and fever. The infection may be spread by coughing, kissing, or touching others after touching your mouth or nose. Antibiotic medications do not work against viruses, so they are not used for treating this condition.  Home care  · If your symptoms are severe, rest at home. Return to work or school when you feel well enough.   · Drink plenty of fluids to avoid dehydration.  · For children: Use acetaminophen for fever, fussiness or discomfort. In infants over six months of age, you may use ibuprofen instead of acetaminophen. (NOTE: If your child has chronic liver or kidney disease or ever had a stomach ulcer or GI bleeding, talk with your doctor before using these medicines.) (NOTE: Aspirin should never be used in anyone under 18 years of age who is ill with a fever. It may cause severe liver damage.)   · For adults: You may use acetaminophen or ibuprofen to control pain or fever, unless another medicine was prescribed for this. (NOTE: If you have chronic liver or kidney disease or ever had a stomach ulcer or GI bleeding, talk with your doctor before using these medicines.)  · Throat lozenges or numbing throat sprays can help reduce pain. Gargling with warm salt water will also help reduce throat pain. For this, dissolve 1/2 teaspoon of salt in 1 glass of warm water. To help soothe a sore throat, children can sip on juice or a popsicle. Children 5 years and older can also suck on a lollipop or hard candy.  · Avoid salty or spicy foods, which can be irritating to the throat.  Follow-up care  Follow up with your healthcare provider or our staff if you are not improving over the next week.  When to seek medical advice  Call your healthcare provider right away if any of these  occur:  · Fever as directed by your doctor.  For children, seek care if:  ¨ Your child is of any age and has repeated fevers above 104°F (40°C).  ¨ Your child is younger than 2 years of age and has a fever of 100.4°F (38°C) that continues for more than 1 day.  ¨ Your child is 2 years old or older and has a fever of 100.4°F (38°C) that continues for more than 3 days.  · New or worsening ear pain, sinus pain, or headache  · Painful lumps in the back of neck  · Stiff neck  · Lymph nodes are getting larger  · Inability to swallow liquids, excessive drooling, or inability to open mouth wide due to throat pain  · Signs of dehydration (very dark urine or no urine, sunken eyes, dizziness)  · Trouble breathing or noisy breathing  · Muffled voice  · New rash  · Child appears to be getting sicker  Date Last Reviewed: 4/13/2015  © 7615-4147 [employer]. [address] [place] [address], [place] [address]. All rights reserved. This information is not intended as a substitute for professional medical care. Always follow your healthcare professional's instructions.

## 2019-09-25 ENCOUNTER — TELEPHONE (OUTPATIENT)
Dept: FAMILY MEDICINE | Facility: CLINIC | Age: 59
End: 2019-09-25

## 2019-09-25 NOTE — TELEPHONE ENCOUNTER
I started her on antibiotics that should help with infection. Did the imaging have anything for tomorrow? If not Monday ok

## 2019-09-25 NOTE — TELEPHONE ENCOUNTER
----- Message from Cleopatra Harden sent at 9/25/2019 12:23 PM CDT -----  Contact: Jessica  The patient saw Angi yesterday. She has a lump in her throat. She forgot to tell Angi she can taste the pus when she swallows. She has an U/S Monday. Does she want it to be sooner or keep it for  Monday.She can not do it Friday. pts # [phone] GH

## 2019-09-25 NOTE — TELEPHONE ENCOUNTER
Spoke with pt and let her know that the abx would help with the infection. Pt states she will go have the imaging done on Monday.

## 2019-09-30 ENCOUNTER — HOSPITAL ENCOUNTER (OUTPATIENT)
Dept: RADIOLOGY | Facility: HOSPITAL | Age: 59
Discharge: HOME | End: 2019-09-30
Attending: NURSE PRACTITIONER
Payer: COMMERCIAL

## 2019-09-30 DIAGNOSIS — E04.1 THYROID NODULE: ICD-10-CM

## 2019-09-30 PROCEDURE — 76536 US EXAM OF HEAD AND NECK: CPT | Mod: TC,PO

## 2019-10-01 ENCOUNTER — TELEPHONE (OUTPATIENT)
Dept: FAMILY MEDICINE | Facility: CLINIC | Age: 59
End: 2019-10-01

## 2019-10-01 NOTE — TELEPHONE ENCOUNTER
----- Message from Angi Barrett NP sent at 9/30/2019 11:16 PM CDT -----  Thyroid ultrasound shows multiple nodules essentially unchanged since 2016. Repeat in 1 year.They do see minimally enlarged lymph nodes. We will recheck these at office visit in 2 weeks.

## 2019-10-01 NOTE — TELEPHONE ENCOUNTER
Spoke to patient with information from Angi. Remind me creaaated for ultrasound in 1 year. She doesn't have an office visit in 2 weeks. Do you want her to schedule one?

## 2019-10-14 ENCOUNTER — TELEPHONE (OUTPATIENT)
Dept: FAMILY MEDICINE | Facility: CLINIC | Age: 59
End: 2019-10-14

## 2019-10-14 DIAGNOSIS — J02.9 PHARYNGITIS, UNSPECIFIED ETIOLOGY: Primary | ICD-10-CM

## 2019-10-14 NOTE — TELEPHONE ENCOUNTER
----- Message from Cleopatra Harden sent at 10/14/2019 10:20 AM CDT -----  Contact: Jessica  The patient had to ramone  her apt with Angi on Wednesday. She made an apt for October 29h with Angi. Her throat is still hurting her. She has more pain medications left. Should she take them . Should she be on more antibiotics.[facility] [address]. Please call and let her know .pts # [phone] GH

## 2019-11-12 DIAGNOSIS — K21.9 GASTROESOPHAGEAL REFLUX DISEASE, ESOPHAGITIS PRESENCE NOT SPECIFIED: Primary | ICD-10-CM

## 2019-11-12 NOTE — TELEPHONE ENCOUNTER
----- Message from Anne Bull sent at 11/12/2019  8:33 AM CST -----  Contact: Jessica Mitchell  Needs a refill on her Ranitidine 300MG tablet  Send to [facility]oy  Pt# [phone]

## 2019-11-13 ENCOUNTER — TELEPHONE (OUTPATIENT)
Dept: FAMILY MEDICINE | Facility: CLINIC | Age: 59
End: 2019-11-13

## 2019-11-13 DIAGNOSIS — E78.2 MIXED HYPERLIPIDEMIA: ICD-10-CM

## 2019-11-13 DIAGNOSIS — K21.9 GASTROESOPHAGEAL REFLUX DISEASE, ESOPHAGITIS PRESENCE NOT SPECIFIED: Primary | ICD-10-CM

## 2019-11-13 RX ORDER — PRAVASTATIN SODIUM 40 MG/1
40 TABLET ORAL DAILY
Qty: 30 TABLET | Refills: 11 | Status: SHIPPED | OUTPATIENT
Start: 2019-11-13 | End: 2020-11-04 | Stop reason: SDUPTHER

## 2019-11-13 RX ORDER — FAMOTIDINE 20 MG/1
20 TABLET, FILM COATED ORAL 2 TIMES DAILY
Qty: 60 TABLET | Refills: 11 | Status: SHIPPED | OUTPATIENT
Start: 2019-11-13 | End: 2020-07-06

## 2019-11-13 NOTE — TELEPHONE ENCOUNTER
----- Message from Ann Moreau sent at 11/13/2019 12:15 PM CST -----  Pt needs a refill for Ranitidine 300 mg, Pravastatin 40 mg. [facility] [address].

## 2019-11-13 NOTE — TELEPHONE ENCOUNTER
----- Message from Anne Bull sent at 11/12/2019  8:40 AM CST -----  Contact: Jessica Mitchell  Patient left another message. She needs Nurse miihr to give her a call back soon please in regards to some papers.   Pt# [phone]

## 2019-12-01 RX ORDER — APIXABAN 5 MG/1
5 TABLET, FILM COATED ORAL 2 TIMES DAILY
Qty: 60 TABLET | Refills: 1 | Status: SHIPPED | OUTPATIENT
Start: 2019-12-01 | End: 2020-01-27 | Stop reason: SDUPTHER

## 2019-12-18 ENCOUNTER — TELEPHONE (OUTPATIENT)
Dept: FAMILY MEDICINE | Facility: CLINIC | Age: 59
End: 2019-12-18

## 2019-12-18 DIAGNOSIS — Z79.899 ENCOUNTER FOR LONG-TERM (CURRENT) USE OF OTHER MEDICATIONS: Primary | ICD-10-CM

## 2019-12-18 DIAGNOSIS — E78.5 HYPERLIPIDEMIA, UNSPECIFIED HYPERLIPIDEMIA TYPE: ICD-10-CM

## 2019-12-18 NOTE — TELEPHONE ENCOUNTER
----- Message from Anne Bull sent at 12/18/2019  2:03 PM CST -----  Contact: Jessica Mitchell  Vm- patient is returning nurse call. I could barley understand the who message because her phone connection was bad.  Pt# [phone]

## 2019-12-30 ENCOUNTER — OFFICE VISIT (OUTPATIENT)
Dept: FAMILY MEDICINE | Facility: CLINIC | Age: 59
End: 2019-12-30
Payer: COMMERCIAL

## 2019-12-30 VITALS
WEIGHT: 203.63 LBS | SYSTOLIC BLOOD PRESSURE: 136 MMHG | DIASTOLIC BLOOD PRESSURE: 78 MMHG | BODY MASS INDEX: 34.76 KG/M2 | HEIGHT: 64 IN | HEART RATE: 92 BPM

## 2019-12-30 DIAGNOSIS — F41.9 ANXIETY DISORDER, UNSPECIFIED TYPE: ICD-10-CM

## 2019-12-30 DIAGNOSIS — D68.9 COAGULATION DISORDER: ICD-10-CM

## 2019-12-30 DIAGNOSIS — K21.9 GASTROESOPHAGEAL REFLUX DISEASE, ESOPHAGITIS PRESENCE NOT SPECIFIED: ICD-10-CM

## 2019-12-30 DIAGNOSIS — I10 HYPERTENSION, UNSPECIFIED TYPE: ICD-10-CM

## 2019-12-30 DIAGNOSIS — E78.5 HYPERLIPIDEMIA, UNSPECIFIED HYPERLIPIDEMIA TYPE: ICD-10-CM

## 2019-12-30 DIAGNOSIS — N62 LARGE BREASTS: Primary | ICD-10-CM

## 2019-12-30 DIAGNOSIS — Z79.01 LONG TERM (CURRENT) USE OF ANTICOAGULANTS: ICD-10-CM

## 2019-12-30 PROCEDURE — 3078F DIAST BP <80 MM HG: CPT | Mod: S$GLB,,, | Performed by: NURSE PRACTITIONER

## 2019-12-30 PROCEDURE — 3008F PR BODY MASS INDEX (BMI) DOCUMENTED: ICD-10-PCS | Mod: S$GLB,,, | Performed by: NURSE PRACTITIONER

## 2019-12-30 PROCEDURE — 3075F SYST BP GE 130 - 139MM HG: CPT | Mod: S$GLB,,, | Performed by: NURSE PRACTITIONER

## 2019-12-30 PROCEDURE — 3075F PR MOST RECENT SYSTOLIC BLOOD PRESS GE 130-139MM HG: ICD-10-PCS | Mod: S$GLB,,, | Performed by: NURSE PRACTITIONER

## 2019-12-30 PROCEDURE — 3078F PR MOST RECENT DIASTOLIC BLOOD PRESSURE < 80 MM HG: ICD-10-PCS | Mod: S$GLB,,, | Performed by: NURSE PRACTITIONER

## 2019-12-30 PROCEDURE — 99214 OFFICE O/P EST MOD 30 MIN: CPT | Mod: S$GLB,,, | Performed by: NURSE PRACTITIONER

## 2019-12-30 PROCEDURE — 3008F BODY MASS INDEX DOCD: CPT | Mod: S$GLB,,, | Performed by: NURSE PRACTITIONER

## 2019-12-30 PROCEDURE — 99214 PR OFFICE/OUTPT VISIT, EST, LEVL IV, 30-39 MIN: ICD-10-PCS | Mod: S$GLB,,, | Performed by: NURSE PRACTITIONER

## 2019-12-30 RX ORDER — AMLODIPINE BESYLATE 5 MG/1
TABLET ORAL
COMMUNITY
Start: 2018-08-17 | End: 2020-03-16

## 2019-12-30 RX ORDER — TRAMADOL HYDROCHLORIDE 50 MG/1
TABLET ORAL
COMMUNITY
Start: 2018-08-17 | End: 2019-12-30 | Stop reason: SDUPTHER

## 2019-12-30 RX ORDER — TRAMADOL HYDROCHLORIDE 50 MG/1
TABLET ORAL
Qty: 60 TABLET | Refills: 0 | Status: SHIPPED | OUTPATIENT
Start: 2019-12-30 | End: 2020-01-02 | Stop reason: RX

## 2019-12-30 RX ORDER — CYCLOBENZAPRINE HCL 10 MG
10 TABLET ORAL 3 TIMES DAILY PRN
Qty: 90 TABLET | Refills: 0 | Status: SHIPPED | OUTPATIENT
Start: 2019-12-30 | End: 2020-01-27 | Stop reason: SDUPTHER

## 2019-12-30 NOTE — PROGRESS NOTES
SUBJECTIVE:    Patient ID: Jessica Mitchell is a 59 y.o. female.    Chief Complaint: Follow-up (has bottles /VM)    59 year old female presents for check up. Reports overall feeling ok. Reflux symptoms seem to be better. Recently switched to pepcid due to zantac recall. Sleeps ok. Suffers daily from neck pain. Does have large breasts and was told that the neck pain was due to weight of breasts. Patient would like to discuss surgical options for reduction.       No visits with results within 6 Month(s) from this visit.   Latest known visit with results is:   Admission on 10/06/2017, Discharged on 10/06/2017   Component Date Value Ref Range Status    aPTT 10/06/2017 27.0  21.0 - 32.0 sec Final    Prothrombin Time 10/06/2017 10.1  9.0 - 12.5 sec Final    INR 10/06/2017 1.0  0.8 - 1.2 Final       Past Medical History:   Diagnosis Date    Adrenal tumor     DVT (deep venous thrombosis) 2012    Hiatal hernia     HTN (hypertension)     Multiple thyroid nodules     two    Stomach ulcer     Stroke     at 31 years old     Past Surgical History:   Procedure Laterality Date    Bilatweral Tubal ligation      ESOPHAGEAL DILATION       No family history on file.    Marital Status:   Alcohol History:  reports that she drinks alcohol.  Tobacco History:  reports that she has quit smoking. She has never used smokeless tobacco.  Drug History:  reports that she does not use drugs.    Review of patient's allergies indicates:   Allergen Reactions    Codeine Itching    Codeine sulfate      Other reaction(s): Unknown    Lisinopril Other (See Comments)     cough  Other reaction(s): Unknown       Current Outpatient Medications:     amLODIPine (NORVASC) 5 MG tablet, Take 1 tablet (5 mg total) by mouth once daily., Disp: 90 tablet, Rfl: 1    amLODIPine (NORVASC) 5 MG tablet, 1 tablet, Disp: , Rfl:     apixaban (ELIQUIS) 5 mg Tab, as directed, Disp: 180 tablet, Rfl: 1    cyclobenzaprine (FLEXERIL) 10 MG tablet, Take 1  tablet (10 mg total) by mouth 3 (three) times daily as needed for Muscle spasms., Disp: 90 tablet, Rfl: 0    DULoxetine (CYMBALTA) 30 MG capsule, TAKE ONE CAPSULE BY MOUTH ONCE DAILY FOR 2 WEEKS THEN INCREASE TO 60MG (2 CAPSULES) DAILY IF TOLERATED, Disp: , Rfl: 3    famotidine (PEPCID) 20 MG tablet, Take 1 tablet (20 mg total) by mouth 2 (two) times daily., Disp: 60 tablet, Rfl: 11    warfarin (COUMADIN) 2 MG tablet, Take 9 mg by mouth Daily. , Disp: , Rfl:     DULoxetine 30 mg CDRS, duloxetine 30 mg capsule,delayed release  TAKE ONE CAPSULE BY MOUTH ONCE DAILY FOR 2 WEEKS, THEN INCREASE TO 60MG (2 CAPSULES) DAILY IF TOLERATED, Disp: , Rfl:     ELIQUIS 5 mg Tab, Take 1 tablet (5 mg total) by mouth 2 (two) times daily., Disp: 60 tablet, Rfl: 1    enoxaparin (LOVENOX) 150 mg/mL Syrg, Inject 1 mg/kg into the skin every 12 (twelve) hours., Disp: , Rfl:     lorazepam (ATIVAN) 1 MG tablet, Take 1 mg by mouth every 12 (twelve) hours as needed for Anxiety., Disp: , Rfl:     pravastatin (PRAVACHOL) 40 MG tablet, Take 1 tablet (40 mg total) by mouth once daily., Disp: 30 tablet, Rfl: 11    traMADol (ULTRAM) 50 mg tablet, Take 1 tablet (50 mg total) by mouth every 6 (six) hours., Disp: 60 tablet, Rfl: 0    Review of Systems   Constitutional: Negative for chills, fever and unexpected weight change.   HENT: Negative for ear pain, rhinorrhea and sore throat.    Eyes: Negative for pain and visual disturbance.   Respiratory: Negative for cough and shortness of breath.    Cardiovascular: Negative for chest pain, palpitations and leg swelling.   Gastrointestinal: Negative for abdominal pain, diarrhea, nausea and vomiting.   Genitourinary: Negative for difficulty urinating, hematuria and vaginal bleeding.   Musculoskeletal: Positive for neck pain. Negative for arthralgias.   Skin: Negative for rash.   Neurological: Negative for dizziness, weakness and headaches.   Psychiatric/Behavioral: Negative for agitation and sleep  "disturbance. The patient is not nervous/anxious.           Objective:      Vitals:    12/30/19 1021 12/30/19 1022   BP: (!) 148/90 136/78   Pulse: 92    Weight: 92.4 kg (203 lb 9.6 oz)    Height: 5' 3.5" (1.613 m)      Body mass index is 35.5 kg/m².  Physical Exam   Constitutional: She is oriented to person, place, and time. She appears well-developed and well-nourished.   HENT:   Right Ear: External ear normal.   Left Ear: External ear normal.   Mouth/Throat: Oropharynx is clear and moist.   Eyes: Pupils are equal, round, and reactive to light. Conjunctivae are normal.   Neck: Normal range of motion. Neck supple. No JVD present.   Cardiovascular: Normal rate and regular rhythm.   No murmur heard.  Pulmonary/Chest: Effort normal and breath sounds normal.   Abdominal: Soft. Bowel sounds are normal.   Musculoskeletal: Normal range of motion. She exhibits no edema or deformity.        Cervical back: She exhibits tenderness and spasm. She exhibits no bony tenderness.   Lymphadenopathy:     She has no cervical adenopathy.   Neurological: She is alert and oriented to person, place, and time. Gait normal.   Skin: Skin is warm, dry and intact. No rash noted.   Psychiatric: She has a normal mood and affect. Her speech is normal and behavior is normal.         Assessment:       1. Large breasts    2. Hypertension, unspecified type    3. Gastroesophageal reflux disease, esophagitis presence not specified    4. Long term (current) use of anticoagulants    5. Coagulation disorder    6. Anxiety disorder, unspecified type    7. Hyperlipidemia, unspecified hyperlipidemia type         Plan:       Large breasts  -     Ambulatory Referral to Plastic Surgery    Hypertension, unspecified type    Gastroesophageal reflux disease, esophagitis presence not specified    Long term (current) use of anticoagulants    Coagulation disorder    Anxiety disorder, unspecified type    Hyperlipidemia, unspecified hyperlipidemia type    Other orders  -  "    cyclobenzaprine (FLEXERIL) 10 MG tablet; Take 1 tablet (10 mg total) by mouth 3 (three) times daily as needed for Muscle spasms.  Dispense: 90 tablet; Refill: 0  -     Discontinue: traMADol (ULTRAM) 50 mg tablet; 1 tablet as needed q12 prn  Dispense: 60 tablet; Refill: 0  -     apixaban (ELIQUIS) 5 mg Tab; as directed  Dispense: 180 tablet; Refill: 1      Follow up in about 3 months (around 3/30/2020).

## 2020-01-02 RX ORDER — TRAMADOL HYDROCHLORIDE 50 MG/1
50 TABLET ORAL EVERY 6 HOURS
Qty: 60 TABLET | Refills: 0 | Status: SHIPPED | OUTPATIENT
Start: 2020-01-02 | End: 2020-03-16 | Stop reason: SDUPTHER

## 2020-01-02 NOTE — TELEPHONE ENCOUNTER
----- Message from Fabio Bragg sent at 1/2/2020 12:36 PM CST -----  Pt says [facility] [address] she is saying that the tramadol that was called they are out of stock. Pt wants the to up the dose to the highest dose after 50 mg   Pt [phone]

## 2020-01-27 RX ORDER — CYCLOBENZAPRINE HCL 10 MG
10 TABLET ORAL 3 TIMES DAILY PRN
Qty: 90 TABLET | Refills: 0 | Status: SHIPPED | OUTPATIENT
Start: 2020-01-27 | End: 2020-03-16 | Stop reason: SDUPTHER

## 2020-01-27 RX ORDER — APIXABAN 5 MG/1
5 TABLET, FILM COATED ORAL 2 TIMES DAILY
Qty: 180 TABLET | Refills: 1 | Status: SHIPPED | OUTPATIENT
Start: 2020-01-27 | End: 2020-07-06 | Stop reason: SDUPTHER

## 2020-01-27 NOTE — TELEPHONE ENCOUNTER
----- Message from Sarah Bryant sent at 1/27/2020  9:23 AM CST -----  Patient needs a refill of flexeril and eliquis [facility] to [facility] [email or web address] call back number [phone]

## 2020-03-02 RX ORDER — DULOXETIN HYDROCHLORIDE 30 MG/1
CAPSULE, DELAYED RELEASE ORAL
Qty: 30 CAPSULE | Refills: 2 | Status: SHIPPED | OUTPATIENT
Start: 2020-03-02 | End: 2020-05-26 | Stop reason: SDUPTHER

## 2020-03-02 NOTE — TELEPHONE ENCOUNTER
Spoke with pt - wants to know if someone will write the Cymbalta for her that Dr. Arita was writing for her. She cannot afford to pay the specialists copay right now and they are refusing to give her a refill until she is seen. She has an appt with Angi cantu and states she has already been out of it for 2 weeks.

## 2020-03-02 NOTE — TELEPHONE ENCOUNTER
----- Message from Fabio Bragg sent at 3/2/2020 11:51 AM CST -----  Pt is needing a call back about a medication   Pt [phone]

## 2020-03-12 ENCOUNTER — TELEPHONE (OUTPATIENT)
Dept: FAMILY MEDICINE | Facility: CLINIC | Age: 60
End: 2020-03-12

## 2020-03-12 NOTE — TELEPHONE ENCOUNTER
----- Message from Anne Bull sent at 3/12/2020  4:11 PM CDT -----  Contact: Sherine trotter/ [facility] says she needs current med list on the pt right now to be faxed over /ASAP  Fax# [phone]  Tel:# [phone]

## 2020-03-13 ENCOUNTER — TELEPHONE (OUTPATIENT)
Dept: FAMILY MEDICINE | Facility: CLINIC | Age: 60
End: 2020-03-13

## 2020-03-13 NOTE — TELEPHONE ENCOUNTER
Called and left detailed message on machine for pt. I also scheduled pt with ANA PAULA Monday @ 10:40. I told pt to call us back if she could not make that appt.

## 2020-03-13 NOTE — TELEPHONE ENCOUNTER
----- Message from Anne Bull sent at 3/13/2020  9:17 AM CDT -----  Contact: Jessica Mitchell  Pt was treated for a mini stroke LTI . She was rushed to the ER and the only reason she came home was because they had no where to put her, the hospital was full. She says she is on eliquis and her concern was why was she having a mini stroke when she is already on a blood thinner. She says she needs to be seen by next week for an ER follow up . She would also like to know if she should add an aspirin to her eliquis.    PT# [phone]

## 2020-03-16 ENCOUNTER — TELEPHONE (OUTPATIENT)
Dept: FAMILY MEDICINE | Facility: CLINIC | Age: 60
End: 2020-03-16

## 2020-03-16 ENCOUNTER — HOSPITAL ENCOUNTER (OUTPATIENT)
Dept: RADIOLOGY | Facility: HOSPITAL | Age: 60
Discharge: HOME | End: 2020-03-16
Attending: NURSE PRACTITIONER
Payer: COMMERCIAL

## 2020-03-16 ENCOUNTER — OFFICE VISIT (OUTPATIENT)
Dept: FAMILY MEDICINE | Facility: CLINIC | Age: 60
End: 2020-03-16
Payer: COMMERCIAL

## 2020-03-16 VITALS
DIASTOLIC BLOOD PRESSURE: 82 MMHG | HEIGHT: 64 IN | SYSTOLIC BLOOD PRESSURE: 126 MMHG | BODY MASS INDEX: 34.83 KG/M2 | HEART RATE: 88 BPM | WEIGHT: 204 LBS

## 2020-03-16 DIAGNOSIS — R10.9 SIDE PAIN: ICD-10-CM

## 2020-03-16 DIAGNOSIS — K21.9 GASTROESOPHAGEAL REFLUX DISEASE, ESOPHAGITIS PRESENCE NOT SPECIFIED: ICD-10-CM

## 2020-03-16 DIAGNOSIS — M54.50 LOW BACK PAIN, UNSPECIFIED BACK PAIN LATERALITY, UNSPECIFIED CHRONICITY, UNSPECIFIED WHETHER SCIATICA PRESENT: Primary | ICD-10-CM

## 2020-03-16 DIAGNOSIS — R10.11 RIGHT UPPER QUADRANT ABDOMINAL PAIN: ICD-10-CM

## 2020-03-16 DIAGNOSIS — E78.5 HYPERLIPIDEMIA, UNSPECIFIED HYPERLIPIDEMIA TYPE: ICD-10-CM

## 2020-03-16 DIAGNOSIS — I10 HYPERTENSION, UNSPECIFIED TYPE: ICD-10-CM

## 2020-03-16 DIAGNOSIS — F41.9 ANXIETY DISORDER, UNSPECIFIED TYPE: ICD-10-CM

## 2020-03-16 PROCEDURE — 3079F PR MOST RECENT DIASTOLIC BLOOD PRESSURE 80-89 MM HG: ICD-10-PCS | Mod: S$GLB,,, | Performed by: NURSE PRACTITIONER

## 2020-03-16 PROCEDURE — 3008F BODY MASS INDEX DOCD: CPT | Mod: S$GLB,,, | Performed by: NURSE PRACTITIONER

## 2020-03-16 PROCEDURE — 99214 OFFICE O/P EST MOD 30 MIN: CPT | Mod: S$GLB,,, | Performed by: NURSE PRACTITIONER

## 2020-03-16 PROCEDURE — 3008F PR BODY MASS INDEX (BMI) DOCUMENTED: ICD-10-PCS | Mod: S$GLB,,, | Performed by: NURSE PRACTITIONER

## 2020-03-16 PROCEDURE — 3074F PR MOST RECENT SYSTOLIC BLOOD PRESSURE < 130 MM HG: ICD-10-PCS | Mod: S$GLB,,, | Performed by: NURSE PRACTITIONER

## 2020-03-16 PROCEDURE — 99214 PR OFFICE/OUTPT VISIT, EST, LEVL IV, 30-39 MIN: ICD-10-PCS | Mod: S$GLB,,, | Performed by: NURSE PRACTITIONER

## 2020-03-16 PROCEDURE — 76705 ECHO EXAM OF ABDOMEN: CPT | Mod: TC

## 2020-03-16 PROCEDURE — 3079F DIAST BP 80-89 MM HG: CPT | Mod: S$GLB,,, | Performed by: NURSE PRACTITIONER

## 2020-03-16 PROCEDURE — 3074F SYST BP LT 130 MM HG: CPT | Mod: S$GLB,,, | Performed by: NURSE PRACTITIONER

## 2020-03-16 RX ORDER — TRAMADOL HYDROCHLORIDE 50 MG/1
50 TABLET ORAL EVERY 6 HOURS
Qty: 60 TABLET | Refills: 0 | Status: SHIPPED | OUTPATIENT
Start: 2020-03-16 | End: 2020-05-13 | Stop reason: SDUPTHER

## 2020-03-16 RX ORDER — CYCLOBENZAPRINE HCL 10 MG
10 TABLET ORAL 3 TIMES DAILY PRN
Qty: 90 TABLET | Refills: 0 | Status: SHIPPED | OUTPATIENT
Start: 2020-03-16 | End: 2020-04-14 | Stop reason: SDUPTHER

## 2020-03-16 RX ORDER — ASPIRIN 81 MG/1
81 TABLET ORAL 2 TIMES DAILY
COMMUNITY
End: 2022-03-09

## 2020-03-16 NOTE — PROGRESS NOTES
SUBJECTIVE:    Patient ID: Jessica Mitchell is a 59 y.o. female.    Chief Complaint: ER follow up (brought bottles tb) and Fatigue    59 year old female presents for hospital follow up. Patient was seen at [facility] for abdominal pain. Reports that had 2 ct performed and lab work. We do not have results to confirm but patient reports that was they were normal. Diagnosed with possible tia. Reports still having occasional abdominal pain. Reports that pain comes and goes. Predominantly in ruq. No nvd. Still having gerd symptoms at times.   No speech deficits. No weakness. Taking eliquis 2 x per day.       No visits with results within 6 Month(s) from this visit.   Latest known visit with results is:   Admission on 10/06/2017, Discharged on 10/06/2017   Component Date Value Ref Range Status    aPTT 10/06/2017 27.0  21.0 - 32.0 sec Final    Prothrombin Time 10/06/2017 10.1  9.0 - 12.5 sec Final    INR 10/06/2017 1.0  0.8 - 1.2 Final       Past Medical History:   Diagnosis Date    Adrenal tumor     DVT (deep venous thrombosis) 2012    Hiatal hernia     HTN (hypertension)     Multiple thyroid nodules     two    Stomach ulcer     Stroke     at 31 years old     Past Surgical History:   Procedure Laterality Date    Bilatweral Tubal ligation      ESOPHAGEAL DILATION       History reviewed. No pertinent family history.    Marital Status:   Alcohol History:  reports that she drinks alcohol.  Tobacco History:  reports that she has quit smoking. She has never used smokeless tobacco.  Drug History:  reports that she does not use drugs.    Review of patient's allergies indicates:   Allergen Reactions    Codeine Itching    Codeine sulfate      Other reaction(s): Unknown    Lisinopril Other (See Comments)     cough  Other reaction(s): Unknown       Current Outpatient Medications:     amLODIPine (NORVASC) 5 MG tablet, Take 1 tablet (5 mg total) by mouth once daily., Disp: 90 tablet, Rfl: 1    aspirin  "(ECOTRIN) 81 MG EC tablet, Take 81 mg by mouth once daily., Disp: , Rfl:     cyclobenzaprine (FLEXERIL) 10 MG tablet, Take 1 tablet (10 mg total) by mouth 3 (three) times daily as needed for Muscle spasms., Disp: 90 tablet, Rfl: 0    DULoxetine (CYMBALTA) 30 MG capsule, 1 po qd, Disp: 30 capsule, Rfl: 2    ELIQUIS 5 mg Tab, Take 1 tablet (5 mg total) by mouth 2 (two) times daily., Disp: 180 tablet, Rfl: 1    famotidine (PEPCID) 20 MG tablet, Take 1 tablet (20 mg total) by mouth 2 (two) times daily., Disp: 60 tablet, Rfl: 11    pravastatin (PRAVACHOL) 40 MG tablet, Take 1 tablet (40 mg total) by mouth once daily., Disp: 30 tablet, Rfl: 11    traMADol (ULTRAM) 50 mg tablet, Take 1 tablet (50 mg total) by mouth every 6 (six) hours., Disp: 60 tablet, Rfl: 0    Review of Systems   Constitutional: Negative for chills, fever and unexpected weight change.   HENT: Negative for ear pain, rhinorrhea and sore throat.    Eyes: Negative for pain and visual disturbance.   Respiratory: Negative for cough and shortness of breath.    Cardiovascular: Negative for chest pain, palpitations and leg swelling.   Gastrointestinal: Positive for abdominal pain. Negative for diarrhea, nausea and vomiting.   Genitourinary: Negative for difficulty urinating, hematuria and vaginal bleeding.   Musculoskeletal: Negative for arthralgias.   Skin: Negative for rash.   Neurological: Negative for dizziness, weakness and headaches.   Psychiatric/Behavioral: Negative for agitation and sleep disturbance. The patient is not nervous/anxious.           Objective:      Vitals:    03/16/20 1035   BP: 126/82   Pulse: 88   Weight: 92.5 kg (204 lb)   Height: 5' 3.5" (1.613 m)     Body mass index is 35.57 kg/m².  Physical Exam   Constitutional: She is oriented to person, place, and time. She appears well-developed and well-nourished.   HENT:   Right Ear: External ear normal.   Left Ear: External ear normal.   Mouth/Throat: Oropharynx is clear and moist. "   Neck: Normal range of motion. Neck supple. No JVD present.   Cardiovascular: Normal rate and regular rhythm.   No murmur heard.  Pulmonary/Chest: Effort normal and breath sounds normal.       Abdominal: Soft. Bowel sounds are normal. There is tenderness in the right upper quadrant. There is no CVA tenderness.   Musculoskeletal: Normal range of motion. She exhibits no edema or deformity.   Lymphadenopathy:     She has no cervical adenopathy.   Neurological: She is alert and oriented to person, place, and time. Gait normal.   Skin: Skin is warm, dry and intact. No rash noted.   Psychiatric: She has a normal mood and affect. Her speech is normal and behavior is normal.         Assessment:       1. Low back pain, unspecified back pain laterality, unspecified chronicity, unspecified whether sciatica present    2. Anxiety disorder, unspecified type    3. Hypertension, unspecified type    4. Hyperlipidemia, unspecified hyperlipidemia type    5. Right upper quadrant abdominal pain    6. Side pain    7. Gastroesophageal reflux disease, esophagitis presence not specified         Plan:       Low back pain, unspecified back pain laterality, unspecified chronicity, unspecified whether sciatica present  -     cyclobenzaprine (FLEXERIL) 10 MG tablet; Take 1 tablet (10 mg total) by mouth 3 (three) times daily as needed for Muscle spasms.  Dispense: 90 tablet; Refill: 0    Anxiety disorder, unspecified type    Hypertension, unspecified type    Hyperlipidemia, unspecified hyperlipidemia type    Right upper quadrant abdominal pain  -     US Abdomen Limited; Future  -     CBC auto differential; Future; Expected date: 03/16/2020  -     Comprehensive metabolic panel; Future; Expected date: 03/16/2020  -     Amylase; Future; Expected date: 03/16/2020  -     Lipase; Future; Expected date: 03/16/2020    Side pain  -     X-Ray Ribs 2 View Right; Future; Expected date: 03/16/2020    Gastroesophageal reflux disease, esophagitis presence not  specified      Follow up in about 4 weeks (around 4/13/2020) for medication management.

## 2020-03-16 NOTE — TELEPHONE ENCOUNTER
----- Message from Angi Barrett NP sent at 3/16/2020  1:33 PM CDT -----  Fatty liver. No other abnormalities. We are still waiting er records. We will call once we get the lab results.

## 2020-03-17 ENCOUNTER — TELEPHONE (OUTPATIENT)
Dept: FAMILY MEDICINE | Facility: CLINIC | Age: 60
End: 2020-03-17

## 2020-03-17 DIAGNOSIS — R10.11 RIGHT UPPER QUADRANT ABDOMINAL PAIN: Primary | ICD-10-CM

## 2020-03-17 DIAGNOSIS — D68.9 COAGULATION DISORDER: ICD-10-CM

## 2020-03-17 DIAGNOSIS — G45.9 TIA (TRANSIENT ISCHEMIC ATTACK): ICD-10-CM

## 2020-03-17 LAB
ALBUMIN SERPL-MCNC: 3.8 G/DL (ref 3.6–5.1)
ALBUMIN/GLOB SERPL: 1.2 (CALC) (ref 1–2.5)
ALP SERPL-CCNC: 86 U/L (ref 37–153)
ALT SERPL-CCNC: 28 U/L (ref 6–29)
AMYLASE SERPL-CCNC: 31 U/L (ref 21–101)
AST SERPL-CCNC: 23 U/L (ref 10–35)
BASOPHILS # BLD AUTO: 43 CELLS/UL (ref 0–200)
BASOPHILS NFR BLD AUTO: 0.3 %
BILIRUB SERPL-MCNC: 0.4 MG/DL (ref 0.2–1.2)
BUN SERPL-MCNC: 13 MG/DL (ref 7–25)
BUN/CREAT SERPL: NORMAL (CALC) (ref 6–22)
CALCIUM SERPL-MCNC: 9.2 MG/DL (ref 8.6–10.4)
CHLORIDE SERPL-SCNC: 104 MMOL/L (ref 98–110)
CO2 SERPL-SCNC: 26 MMOL/L (ref 20–32)
CREAT SERPL-MCNC: 0.86 MG/DL (ref 0.5–1.05)
EOSINOPHIL # BLD AUTO: 58 CELLS/UL (ref 15–500)
EOSINOPHIL NFR BLD AUTO: 0.4 %
ERYTHROCYTE [DISTWIDTH] IN BLOOD BY AUTOMATED COUNT: 12.4 % (ref 11–15)
GFRSERPLBLD MDRD-ARVRAT: 74 ML/MIN/1.73M2
GLOBULIN SER CALC-MCNC: 3.3 G/DL (CALC) (ref 1.9–3.7)
GLUCOSE SERPL-MCNC: 108 MG/DL (ref 65–139)
HCT VFR BLD AUTO: 44 % (ref 35–45)
HGB BLD-MCNC: 14.6 G/DL (ref 11.7–15.5)
LIPASE SERPL-CCNC: 23 U/L (ref 7–60)
LYMPHOCYTES # BLD AUTO: 2203 CELLS/UL (ref 850–3900)
LYMPHOCYTES NFR BLD AUTO: 15.3 %
MCH RBC QN AUTO: 28.6 PG (ref 27–33)
MCHC RBC AUTO-ENTMCNC: 33.2 G/DL (ref 32–36)
MCV RBC AUTO: 86.3 FL (ref 80–100)
MONOCYTES # BLD AUTO: 763 CELLS/UL (ref 200–950)
MONOCYTES NFR BLD AUTO: 5.3 %
NEUTROPHILS # BLD AUTO: ABNORMAL CELLS/UL (ref 1500–7800)
NEUTROPHILS NFR BLD AUTO: 78.7 %
PLATELET # BLD AUTO: 254 THOUSAND/UL (ref 140–400)
PMV BLD REES-ECKER: 10.1 FL (ref 7.5–12.5)
POTASSIUM SERPL-SCNC: 3.7 MMOL/L (ref 3.5–5.3)
PROT SERPL-MCNC: 7.1 G/DL (ref 6.1–8.1)
RBC # BLD AUTO: 5.1 MILLION/UL (ref 3.8–5.1)
SODIUM SERPL-SCNC: 139 MMOL/L (ref 135–146)
WBC # BLD AUTO: 14.4 THOUSAND/UL (ref 3.8–10.8)

## 2020-03-17 NOTE — TELEPHONE ENCOUNTER
White blood cell count is elevated. Rest of labs are normal. Did we get hospital results yet?????????????

## 2020-03-17 NOTE — LETTER
[address]  [place] [address]  Phone: [phone]   [phone]                        MD Ct Kelly MD Chequita Williams, MD Matthew Bassett, DINORAH Nix, CHACORTA Galloway, CHACORTA      Date: 03/17/2020        Patient: Jessica Mitchell  YOB: 1960       Please fax over pt most recent Hospital Records.      Sincerely,     Olivia Rivera MA

## 2020-03-19 ENCOUNTER — TELEPHONE (OUTPATIENT)
Dept: FAMILY MEDICINE | Facility: CLINIC | Age: 60
End: 2020-03-19

## 2020-03-19 NOTE — TELEPHONE ENCOUNTER
Spoke to patient encouraging her to get on portal. Gave her information to log on. Patient said she will get it activated.

## 2020-03-20 ENCOUNTER — PATIENT MESSAGE (OUTPATIENT)
Dept: FAMILY MEDICINE | Facility: CLINIC | Age: 60
End: 2020-03-20

## 2020-03-21 ENCOUNTER — PATIENT MESSAGE (OUTPATIENT)
Dept: FAMILY MEDICINE | Facility: CLINIC | Age: 60
End: 2020-03-21

## 2020-03-23 ENCOUNTER — TELEPHONE (OUTPATIENT)
Dept: FAMILY MEDICINE | Facility: CLINIC | Age: 60
End: 2020-03-23

## 2020-03-23 ENCOUNTER — OFFICE VISIT (OUTPATIENT)
Dept: FAMILY MEDICINE | Facility: CLINIC | Age: 60
End: 2020-03-23
Payer: COMMERCIAL

## 2020-03-23 DIAGNOSIS — F41.9 ANXIETY DISORDER, UNSPECIFIED TYPE: ICD-10-CM

## 2020-03-23 DIAGNOSIS — G45.9 TIA (TRANSIENT ISCHEMIC ATTACK): ICD-10-CM

## 2020-03-23 DIAGNOSIS — R50.9 FEVER, UNSPECIFIED FEVER CAUSE: ICD-10-CM

## 2020-03-23 DIAGNOSIS — E78.5 HYPERLIPIDEMIA, UNSPECIFIED HYPERLIPIDEMIA TYPE: ICD-10-CM

## 2020-03-23 DIAGNOSIS — K21.9 GASTROESOPHAGEAL REFLUX DISEASE, ESOPHAGITIS PRESENCE NOT SPECIFIED: Primary | ICD-10-CM

## 2020-03-23 DIAGNOSIS — Z79.01 LONG TERM (CURRENT) USE OF ANTICOAGULANTS: ICD-10-CM

## 2020-03-23 DIAGNOSIS — I10 HYPERTENSION, UNSPECIFIED TYPE: ICD-10-CM

## 2020-03-23 PROCEDURE — 99213 PR OFFICE/OUTPT VISIT, EST, LEVL III, 20-29 MIN: ICD-10-PCS | Mod: 95,,, | Performed by: NURSE PRACTITIONER

## 2020-03-23 PROCEDURE — 99213 OFFICE O/P EST LOW 20 MIN: CPT | Mod: 95,,, | Performed by: NURSE PRACTITIONER

## 2020-03-23 NOTE — PROGRESS NOTES
Subjective:        The chief complaint leading to consultation is: follow up  The patient location is:  home  Visit type: Virtual visit with synchronous audio and video    59 year old female presents for follow up.  Was seen at [facility] last weight.  Diagnosed with TIA.  Patient also reports being diagnosed with UTI.  Was seen for follow-up p.r.n. or office last week.  Today patient reports has been having intermittent fever.  T-max as high as 101.6°.  Initially did have some diarrhea but has since resolved.  No cough.  No shortness of breath.  Has taken Tylenol and Motrin with improvement of symptoms.  Today does report feeling much better needed went to work.    Needs referral for GI and Neurology.  Still having issues with GERD.      Past Surgical History:   Procedure Laterality Date    Bilatweral Tubal ligation      ESOPHAGEAL DILATION       Past Medical History:   Diagnosis Date    Adrenal tumor     DVT (deep venous thrombosis) 2012    Hiatal hernia     HTN (hypertension)     Multiple thyroid nodules     two    Stomach ulcer     Stroke     at 31 years old     History reviewed. No pertinent family history.     Social History:   Marital Status:   Alcohol History:  reports that she drinks alcohol.  Tobacco History:  reports that she has quit smoking. She has never used smokeless tobacco.  Drug History:  reports that she does not use drugs.    Review of patient's allergies indicates:   Allergen Reactions    Codeine Itching    Codeine sulfate      Other reaction(s): Unknown    Lisinopril Other (See Comments)     cough  Other reaction(s): Unknown       Current Outpatient Medications   Medication Sig Dispense Refill    amLODIPine (NORVASC) 5 MG tablet Take 1 tablet (5 mg total) by mouth once daily. 90 tablet 1    aspirin (ECOTRIN) 81 MG EC tablet Take 81 mg by mouth once daily.      cyclobenzaprine (FLEXERIL) 10 MG tablet Take 1 tablet (10 mg total) by mouth 3 (three)  times daily as needed for Muscle spasms. 90 tablet 0    DULoxetine (CYMBALTA) 30 MG capsule 1 po qd 30 capsule 2    ELIQUIS 5 mg Tab Take 1 tablet (5 mg total) by mouth 2 (two) times daily. 180 tablet 1    famotidine (PEPCID) 20 MG tablet Take 1 tablet (20 mg total) by mouth 2 (two) times daily. 60 tablet 11    pravastatin (PRAVACHOL) 40 MG tablet Take 1 tablet (40 mg total) by mouth once daily. 30 tablet 11    traMADoL (ULTRAM) 50 mg tablet Take 1 tablet (50 mg total) by mouth every 6 (six) hours. 60 tablet 0     No current facility-administered medications for this visit.        Review of Systems   Constitutional: Negative for chills, fever and unexpected weight change.   HENT: Negative for ear pain, rhinorrhea and sore throat.    Eyes: Negative for pain and visual disturbance.   Respiratory: Negative for cough and shortness of breath.    Cardiovascular: Negative for chest pain, palpitations and leg swelling.   Gastrointestinal: Negative for abdominal pain, diarrhea, nausea and vomiting.   Genitourinary: Negative for difficulty urinating, hematuria and vaginal bleeding.   Musculoskeletal: Negative for arthralgias.   Skin: Negative for rash.   Neurological: Negative for dizziness, weakness and headaches.   Psychiatric/Behavioral: Negative for agitation and sleep disturbance. The patient is not nervous/anxious.          Objective:        Physical Exam:   Physical Exam   Constitutional: She appears well-developed and well-nourished. No distress.            Assessment:       1. Gastroesophageal reflux disease, esophagitis presence not specified    2. Long term (current) use of anticoagulants    3. Hypertension, unspecified type    4. Hyperlipidemia, unspecified hyperlipidemia type    5. Anxiety disorder, unspecified type    6. TIA (transient ischemic attack)    7. Fever, unspecified fever cause      Plan:   Gastroesophageal reflux disease, esophagitis presence not specified    Long term (current) use of  anticoagulants    Hypertension, unspecified type    Hyperlipidemia, unspecified hyperlipidemia type    Anxiety disorder, unspecified type    TIA (transient ischemic attack)    Fever, unspecified fever cause  Comments:  Needs to be tested for corona virus if fever cough or any other symptoms develop.  Discussed i self quarantine ER records reviewed and urine culture negative      Follow up in about 4 weeks (around 4/20/2020) for medication management.    Total time spent with patient: 15min    Each patient to whom he or she provides medical services by telemedicine is:  (1) informed of the relationship between the physician and patient and the respective role of any other health care provider with respect to management of the patient; and (2) notified that he or she may decline to receive medical services by telemedicine and may withdraw from such care at any time.    This note was created using [employer] voice recognition software that occasionally misinterprets phrases or words.

## 2020-03-23 NOTE — TELEPHONE ENCOUNTER
----- Message from Cleopatra Harden sent at 3/23/2020  2:19 PM CDT -----  Contact: Jessica TEJEDA 1:05  The patient wants to speak to Maria R. pts # [phone] . GH

## 2020-03-24 ENCOUNTER — PATIENT MESSAGE (OUTPATIENT)
Dept: FAMILY MEDICINE | Facility: CLINIC | Age: 60
End: 2020-03-24

## 2020-04-14 ENCOUNTER — TELEPHONE (OUTPATIENT)
Dept: FAMILY MEDICINE | Facility: CLINIC | Age: 60
End: 2020-04-14

## 2020-04-14 ENCOUNTER — OFFICE VISIT (OUTPATIENT)
Dept: FAMILY MEDICINE | Facility: CLINIC | Age: 60
End: 2020-04-14
Payer: COMMERCIAL

## 2020-04-14 DIAGNOSIS — F41.9 ANXIETY DISORDER, UNSPECIFIED TYPE: ICD-10-CM

## 2020-04-14 DIAGNOSIS — Z79.01 LONG TERM (CURRENT) USE OF ANTICOAGULANTS: ICD-10-CM

## 2020-04-14 DIAGNOSIS — K59.00 CONSTIPATION, UNSPECIFIED CONSTIPATION TYPE: ICD-10-CM

## 2020-04-14 DIAGNOSIS — M54.50 LOW BACK PAIN, UNSPECIFIED BACK PAIN LATERALITY, UNSPECIFIED CHRONICITY, UNSPECIFIED WHETHER SCIATICA PRESENT: ICD-10-CM

## 2020-04-14 DIAGNOSIS — E78.5 HYPERLIPIDEMIA, UNSPECIFIED HYPERLIPIDEMIA TYPE: ICD-10-CM

## 2020-04-14 DIAGNOSIS — K02.9 DENTAL CARIES: Primary | ICD-10-CM

## 2020-04-14 DIAGNOSIS — I10 HYPERTENSION, UNSPECIFIED TYPE: ICD-10-CM

## 2020-04-14 DIAGNOSIS — Z86.73 HISTORY OF TIA (TRANSIENT ISCHEMIC ATTACK): ICD-10-CM

## 2020-04-14 PROCEDURE — 99213 PR OFFICE/OUTPT VISIT, EST, LEVL III, 20-29 MIN: ICD-10-PCS | Mod: 95,,, | Performed by: NURSE PRACTITIONER

## 2020-04-14 PROCEDURE — 99213 OFFICE O/P EST LOW 20 MIN: CPT | Mod: 95,,, | Performed by: NURSE PRACTITIONER

## 2020-04-14 RX ORDER — AMOXICILLIN 500 MG/1
500 TABLET, FILM COATED ORAL EVERY 12 HOURS
Qty: 20 TABLET | Refills: 0 | Status: SHIPPED | OUTPATIENT
Start: 2020-04-14 | End: 2020-04-24

## 2020-04-14 RX ORDER — CYCLOBENZAPRINE HCL 10 MG
10 TABLET ORAL 3 TIMES DAILY PRN
Qty: 90 TABLET | Refills: 0 | Status: CANCELLED | OUTPATIENT
Start: 2020-04-14

## 2020-04-14 RX ORDER — CYCLOBENZAPRINE HCL 10 MG
10 TABLET ORAL 3 TIMES DAILY PRN
Qty: 90 TABLET | Refills: 0 | Status: SHIPPED | OUTPATIENT
Start: 2020-04-14 | End: 2020-06-22 | Stop reason: SDUPTHER

## 2020-04-14 NOTE — PROGRESS NOTES
Subjective:        The chief complaint leading to consultation is: check up  The patient location is:  Home  Visit type: Virtual visit with synchronous audio/video or audio only  This was a video visit in lieu of in-person visit due to the coronavirus emergency. Patient acknowledged and consented to the video visit encounter.     59 year old female presents for virtual visit. Feeling good. Reports since last visit has not had any additional episodes of fever or abdominal pain. Has seen dr. Munoz. Started on linzess. Thinks it is helping but does have 4-5 episodes of diarrhea after taking. No blood. No pain. No change in appetite. Has not yet been scheduled with neurology. Having trouble with tooth. No fever. Sensitive to cold. Tried calling dentist but has not returned call.       Past Surgical History:   Procedure Laterality Date    Bilatweral Tubal ligation      ESOPHAGEAL DILATION       Past Medical History:   Diagnosis Date    Adrenal tumor     DVT (deep venous thrombosis) 2012    Hiatal hernia     HTN (hypertension)     Multiple thyroid nodules     two    Stomach ulcer     Stroke     at 31 years old     History reviewed. No pertinent family history.     Social History:   Marital Status:   Alcohol History:  reports that she drinks alcohol.  Tobacco History:  reports that she has quit smoking. She has never used smokeless tobacco.  Drug History:  reports that she does not use drugs.    Review of patient's allergies indicates:   Allergen Reactions    Codeine Itching    Codeine sulfate      Other reaction(s): Unknown    Lisinopril Other (See Comments)     cough  Other reaction(s): Unknown       Current Outpatient Medications   Medication Sig Dispense Refill    amLODIPine (NORVASC) 5 MG tablet Take 1 tablet (5 mg total) by mouth once daily. 90 tablet 1    aspirin (ECOTRIN) 81 MG EC tablet Take 81 mg by mouth 2 (two) times daily.      cyclobenzaprine (FLEXERIL) 10 MG tablet Take 1 tablet (10  mg total) by mouth 3 (three) times daily as needed for Muscle spasms. 90 tablet 0    DULoxetine (CYMBALTA) 30 MG capsule 1 po qd 30 capsule 2    ELIQUIS 5 mg Tab Take 1 tablet (5 mg total) by mouth 2 (two) times daily. 180 tablet 1    famotidine (PEPCID) 20 MG tablet Take 1 tablet (20 mg total) by mouth 2 (two) times daily. 60 tablet 11    linaCLOtide (LINZESS) 145 mcg Cap capsule Take 145 mcg by mouth once daily.      pravastatin (PRAVACHOL) 40 MG tablet Take 1 tablet (40 mg total) by mouth once daily. 30 tablet 11    traMADoL (ULTRAM) 50 mg tablet Take 1 tablet (50 mg total) by mouth every 6 (six) hours. 60 tablet 0    amoxicillin (AMOXIL) 500 MG Tab Take 1 tablet (500 mg total) by mouth every 12 (twelve) hours. for 10 days 20 tablet 0     No current facility-administered medications for this visit.        Review of Systems   Constitutional: Negative for chills, fever and unexpected weight change.   HENT: Negative for ear pain, rhinorrhea and sore throat.    Respiratory: Negative for cough and shortness of breath.    Cardiovascular: Negative for chest pain, palpitations and leg swelling.   Gastrointestinal: Negative for abdominal pain, diarrhea, nausea and vomiting.   Genitourinary: Negative for difficulty urinating.   Musculoskeletal: Negative for arthralgias.   Skin: Negative for rash.   Neurological: Negative for dizziness, weakness and headaches.   Psychiatric/Behavioral: Negative for agitation and sleep disturbance. The patient is not nervous/anxious.          Objective:        Physical Exam:   Physical Exam   Constitutional: She appears well-developed and well-nourished. No distress.            Assessment:       1. Dental caries    2. Low back pain, unspecified back pain laterality, unspecified chronicity, unspecified whether sciatica present    3. Anxiety disorder, unspecified type    4. Hypertension, unspecified type    5. Hyperlipidemia, unspecified hyperlipidemia type    6. Long term (current) use  of anticoagulants    7. Constipation, unspecified constipation type    8. History of TIA (transient ischemic attack)      Plan:   Dental caries  -     amoxicillin (AMOXIL) 500 MG Tab; Take 1 tablet (500 mg total) by mouth every 12 (twelve) hours. for 10 days  Dispense: 20 tablet; Refill: 0    Low back pain, unspecified back pain laterality, unspecified chronicity, unspecified whether sciatica present  -     cyclobenzaprine (FLEXERIL) 10 MG tablet; Take 1 tablet (10 mg total) by mouth 3 (three) times daily as needed for Muscle spasms.  Dispense: 90 tablet; Refill: 0    Anxiety disorder, unspecified type    Hypertension, unspecified type    Hyperlipidemia, unspecified hyperlipidemia type    Long term (current) use of anticoagulants    Constipation, unspecified constipation type  Comments:  decrease dose of linzess    History of TIA (transient ischemic attack)  Comments:  will check on referral      Follow up in about 3 months (around 7/14/2020), or if symptoms worsen or fail to improve, for medication management.    Total time spent with patient: 15min    Each patient to whom he or she provides medical services by telemedicine is:  (1) informed of the relationship between the physician and patient and the respective role of any other health care provider with respect to management of the patient; and (2) notified that he or she may decline to receive medical services by telemedicine and may withdraw from such care at any time.    This note was created using [employer] voice recognition software that occasionally misinterprets phrases or words.

## 2020-04-14 NOTE — TELEPHONE ENCOUNTER
Spoke to some one at Dr. Soriano's office . I was able to get pt scheduled for 4/27 at 11:30 with Dr. Sonido Soriano

## 2020-04-14 NOTE — TELEPHONE ENCOUNTER
Per Angi please call  or  to get pt an appt. The referral is already in. Then call and let pt know.

## 2020-04-17 ENCOUNTER — TELEPHONE (OUTPATIENT)
Dept: FAMILY MEDICINE | Facility: CLINIC | Age: 60
End: 2020-04-17

## 2020-04-17 NOTE — TELEPHONE ENCOUNTER
----- Message from Aminata Marshall sent at 4/17/2020  8:34 AM CDT -----  Pt wants to push her neuro referral darryl back to late May and asked us to call.    Pt cb # [phone]

## 2020-04-17 NOTE — TELEPHONE ENCOUNTER
Pt says we scheduled her an appt with Dr Soriano because Angi referred her there. It's scheduled for next Friday but her  said it's too soon for her to go into an office so she wants us to call and r/s to the end of May.. Spoke to Dr Soriano's office. R/S'd her appt for May 26th at 11:30AM

## 2020-04-24 DIAGNOSIS — I10 HYPERTENSION, UNSPECIFIED TYPE: ICD-10-CM

## 2020-04-24 DIAGNOSIS — M54.50 LOW BACK PAIN, UNSPECIFIED BACK PAIN LATERALITY, UNSPECIFIED CHRONICITY, UNSPECIFIED WHETHER SCIATICA PRESENT: ICD-10-CM

## 2020-04-24 NOTE — TELEPHONE ENCOUNTER
After Hours Call:  Patient is requesting refills on norvasc, tramadol and tizanidine.  Tizanidine not on med list.  Patient states it should be 4mg TID.

## 2020-04-27 RX ORDER — AMLODIPINE BESYLATE 5 MG/1
5 TABLET ORAL DAILY
Qty: 90 TABLET | Refills: 1 | OUTPATIENT
Start: 2020-04-27

## 2020-04-27 RX ORDER — TRAMADOL HYDROCHLORIDE 50 MG/1
50 TABLET ORAL EVERY 6 HOURS
Qty: 60 TABLET | Refills: 0 | OUTPATIENT
Start: 2020-04-27

## 2020-05-13 DIAGNOSIS — M54.50 LOW BACK PAIN, UNSPECIFIED BACK PAIN LATERALITY, UNSPECIFIED CHRONICITY, UNSPECIFIED WHETHER SCIATICA PRESENT: ICD-10-CM

## 2020-05-13 RX ORDER — TRAMADOL HYDROCHLORIDE 50 MG/1
50 TABLET ORAL EVERY 6 HOURS
Qty: 60 TABLET | Refills: 0 | Status: SHIPPED | OUTPATIENT
Start: 2020-05-13 | End: 2020-05-26 | Stop reason: SDUPTHER

## 2020-05-26 DIAGNOSIS — G45.9 TRANSIENT CEREBRAL ISCHEMIC ATTACK, UNSPECIFIED: Primary | ICD-10-CM

## 2020-05-26 DIAGNOSIS — I10 HYPERTENSION, UNSPECIFIED TYPE: ICD-10-CM

## 2020-05-26 DIAGNOSIS — M54.50 LOW BACK PAIN, UNSPECIFIED BACK PAIN LATERALITY, UNSPECIFIED CHRONICITY, UNSPECIFIED WHETHER SCIATICA PRESENT: ICD-10-CM

## 2020-05-26 RX ORDER — AMLODIPINE BESYLATE 5 MG/1
5 TABLET ORAL DAILY
Qty: 90 TABLET | Refills: 1 | Status: SHIPPED | OUTPATIENT
Start: 2020-05-26 | End: 2020-11-30 | Stop reason: SDUPTHER

## 2020-05-26 RX ORDER — TRAMADOL HYDROCHLORIDE 50 MG/1
50 TABLET ORAL EVERY 6 HOURS
Qty: 60 TABLET | Refills: 0 | Status: SHIPPED | OUTPATIENT
Start: 2020-05-26 | End: 2020-06-22 | Stop reason: SDUPTHER

## 2020-05-26 RX ORDER — DULOXETIN HYDROCHLORIDE 30 MG/1
CAPSULE, DELAYED RELEASE ORAL
Qty: 30 CAPSULE | Refills: 2 | Status: SHIPPED | OUTPATIENT
Start: 2020-05-26 | End: 2020-06-22 | Stop reason: SDUPTHER

## 2020-05-27 ENCOUNTER — HOSPITAL ENCOUNTER (OUTPATIENT)
Dept: RADIOLOGY | Facility: HOSPITAL | Age: 60
Discharge: HOME | End: 2020-05-27
Attending: PSYCHIATRY & NEUROLOGY
Payer: COMMERCIAL

## 2020-05-27 DIAGNOSIS — G45.9 TRANSIENT CEREBRAL ISCHEMIC ATTACK, UNSPECIFIED: ICD-10-CM

## 2020-05-27 PROCEDURE — 93880 EXTRACRANIAL BILAT STUDY: CPT | Mod: TC,PO

## 2020-06-04 ENCOUNTER — TELEPHONE (OUTPATIENT)
Dept: FAMILY MEDICINE | Facility: CLINIC | Age: 60
End: 2020-06-04

## 2020-06-04 ENCOUNTER — OFFICE VISIT (OUTPATIENT)
Dept: FAMILY MEDICINE | Facility: CLINIC | Age: 60
End: 2020-06-04
Payer: COMMERCIAL

## 2020-06-04 VITALS
WEIGHT: 214 LBS | HEIGHT: 64 IN | SYSTOLIC BLOOD PRESSURE: 132 MMHG | HEART RATE: 88 BPM | BODY MASS INDEX: 36.54 KG/M2 | DIASTOLIC BLOOD PRESSURE: 78 MMHG | TEMPERATURE: 98 F

## 2020-06-04 DIAGNOSIS — I10 HYPERTENSION, UNSPECIFIED TYPE: ICD-10-CM

## 2020-06-04 DIAGNOSIS — E78.5 HYPERLIPIDEMIA, UNSPECIFIED HYPERLIPIDEMIA TYPE: ICD-10-CM

## 2020-06-04 DIAGNOSIS — D68.9 COAGULATION DISORDER: ICD-10-CM

## 2020-06-04 DIAGNOSIS — H60.90 OTITIS EXTERNA, UNSPECIFIED CHRONICITY, UNSPECIFIED LATERALITY, UNSPECIFIED TYPE: Primary | ICD-10-CM

## 2020-06-04 DIAGNOSIS — K21.9 GASTROESOPHAGEAL REFLUX DISEASE, ESOPHAGITIS PRESENCE NOT SPECIFIED: ICD-10-CM

## 2020-06-04 DIAGNOSIS — Z79.01 LONG TERM (CURRENT) USE OF ANTICOAGULANTS: ICD-10-CM

## 2020-06-04 PROCEDURE — 3008F PR BODY MASS INDEX (BMI) DOCUMENTED: ICD-10-PCS | Mod: S$GLB,,, | Performed by: NURSE PRACTITIONER

## 2020-06-04 PROCEDURE — 99214 OFFICE O/P EST MOD 30 MIN: CPT | Mod: S$GLB,,, | Performed by: NURSE PRACTITIONER

## 2020-06-04 PROCEDURE — 3078F DIAST BP <80 MM HG: CPT | Mod: S$GLB,,, | Performed by: NURSE PRACTITIONER

## 2020-06-04 PROCEDURE — 3008F BODY MASS INDEX DOCD: CPT | Mod: S$GLB,,, | Performed by: NURSE PRACTITIONER

## 2020-06-04 PROCEDURE — 3078F PR MOST RECENT DIASTOLIC BLOOD PRESSURE < 80 MM HG: ICD-10-PCS | Mod: S$GLB,,, | Performed by: NURSE PRACTITIONER

## 2020-06-04 PROCEDURE — 99214 PR OFFICE/OUTPT VISIT, EST, LEVL IV, 30-39 MIN: ICD-10-PCS | Mod: S$GLB,,, | Performed by: NURSE PRACTITIONER

## 2020-06-04 PROCEDURE — 3075F SYST BP GE 130 - 139MM HG: CPT | Mod: S$GLB,,, | Performed by: NURSE PRACTITIONER

## 2020-06-04 PROCEDURE — 3075F PR MOST RECENT SYSTOLIC BLOOD PRESS GE 130-139MM HG: ICD-10-PCS | Mod: S$GLB,,, | Performed by: NURSE PRACTITIONER

## 2020-06-04 RX ORDER — NEOMYCIN SULFATE, POLYMYXIN B SULFATE AND HYDROCORTISONE 10; 3.5; 1 MG/ML; MG/ML; [USP'U]/ML
3 SUSPENSION/ DROPS AURICULAR (OTIC) 3 TIMES DAILY
Qty: 10 ML | Refills: 0 | Status: SHIPPED | OUTPATIENT
Start: 2020-06-04 | End: 2020-06-11

## 2020-06-04 NOTE — TELEPHONE ENCOUNTER
----- Message from Cleopatra Harden sent at 6/4/2020  9:13 AM CDT -----  Contact: Jessica eldridge   The patient sees something in her ear and its bleeding. Can she come in. pts # [phone] GH

## 2020-06-04 NOTE — PROGRESS NOTES
SUBJECTIVE:    Patient ID: Jessica Mitchell is a 59 y.o. female.    Chief Complaint: Follow-up (bleeding left ear, no bottles// SW)    59 year old female presents with complaints of left ear bleeding. Noticed blood on pillow upon wakening this am. Denies injury or trauma.   Would like to discuss phentermine. Has taken in the past with good results.       Office Visit on 03/16/2020   Component Date Value Ref Range Status    WBC 03/16/2020 14.4* 3.8 - 10.8 Thousand/uL Final    RBC 03/16/2020 5.10  3.80 - 5.10 Million/uL Final    Hemoglobin 03/16/2020 14.6  11.7 - 15.5 g/dL Final    Hematocrit 03/16/2020 44.0  35.0 - 45.0 % Final    Mean Corpuscular Volume 03/16/2020 86.3  80.0 - 100.0 fL Final    Mean Corpuscular Hemoglobin 03/16/2020 28.6  27.0 - 33.0 pg Final    Mean Corpuscular Hemoglobin Conc 03/16/2020 33.2  32.0 - 36.0 g/dL Final    RDW 03/16/2020 12.4  11.0 - 15.0 % Final    Platelets 03/16/2020 254  140 - 400 Thousand/uL Final    MPV 03/16/2020 10.1  7.5 - 12.5 fL Final    Neutrophils Absolute 03/16/2020 11,333* 1,500 - 7,800 cells/uL Final    Lymph # 03/16/2020 2,203  850 - 3,900 cells/uL Final    Mono # 03/16/2020 763  200 - 950 cells/uL Final    Eos # 03/16/2020 58  15 - 500 cells/uL Final    Baso # 03/16/2020 43  0 - 200 cells/uL Final    Neutrophils Relative 03/16/2020 78.7  % Final    Lymph% 03/16/2020 15.3  % Final    Mono% 03/16/2020 5.3  % Final    Eosinophil% 03/16/2020 0.4  % Final    Basophil% 03/16/2020 0.3  % Final    Glucose 03/16/2020 108  65 - 139 mg/dL Final    BUN, Bld 03/16/2020 13  7 - 25 mg/dL Final    Creatinine 03/16/2020 0.86  0.50 - 1.05 mg/dL Final    eGFR if non African American 03/16/2020 74  > OR = 60 mL/min/1.73m2 Final    eGFR if African American 03/16/2020 86  > OR = 60 mL/min/1.73m2 Final    BUN/Creatinine Ratio 03/16/2020 NOT APPLICABLE  6 - 22 (calc) Final    Sodium 03/16/2020 139  135 - 146 mmol/L Final    Potassium 03/16/2020 3.7  3.5 - 5.3  mmol/L Final    Chloride 03/16/2020 104  98 - 110 mmol/L Final    CO2 03/16/2020 26  20 - 32 mmol/L Final    Calcium 03/16/2020 9.2  8.6 - 10.4 mg/dL Final    Total Protein 03/16/2020 7.1  6.1 - 8.1 g/dL Final    Albumin 03/16/2020 3.8  3.6 - 5.1 g/dL Final    Globulin, Total 03/16/2020 3.3  1.9 - 3.7 g/dL (calc) Final    Albumin/Globulin Ratio 03/16/2020 1.2  1.0 - 2.5 (calc) Final    Total Bilirubin 03/16/2020 0.4  0.2 - 1.2 mg/dL Final    Alkaline Phosphatase 03/16/2020 86  37 - 153 U/L Final    AST 03/16/2020 23  10 - 35 U/L Final    ALT 03/16/2020 28  6 - 29 U/L Final    Amylase 03/16/2020 31  21 - 101 U/L Final    Lipase 03/16/2020 23  7 - 60 U/L Final       Past Medical History:   Diagnosis Date    Adrenal tumor     DVT (deep venous thrombosis) 2012    Hiatal hernia     HTN (hypertension)     Multiple thyroid nodules     two    Stomach ulcer     Stroke     at 31 years old     Past Surgical History:   Procedure Laterality Date    Bilatweral Tubal ligation      ESOPHAGEAL DILATION       History reviewed. No pertinent family history.    Marital Status:   Alcohol History:  reports that she drinks alcohol.  Tobacco History:  reports that she has quit smoking. She has never used smokeless tobacco.  Drug History:  reports that she does not use drugs.    Review of patient's allergies indicates:   Allergen Reactions    Codeine Itching    Codeine sulfate      Other reaction(s): Unknown    Lisinopril Other (See Comments)     cough  Other reaction(s): Unknown       Current Outpatient Medications:     amLODIPine (NORVASC) 5 MG tablet, Take 1 tablet (5 mg total) by mouth once daily., Disp: 90 tablet, Rfl: 1    aspirin (ECOTRIN) 81 MG EC tablet, Take 81 mg by mouth 2 (two) times daily., Disp: , Rfl:     cyclobenzaprine (FLEXERIL) 10 MG tablet, Take 1 tablet (10 mg total) by mouth 3 (three) times daily as needed for Muscle spasms., Disp: 90 tablet, Rfl: 0    DULoxetine (CYMBALTA) 30 MG  "capsule, 1 po qd, Disp: 30 capsule, Rfl: 2    ELIQUIS 5 mg Tab, Take 1 tablet (5 mg total) by mouth 2 (two) times daily., Disp: 180 tablet, Rfl: 1    famotidine (PEPCID) 20 MG tablet, Take 1 tablet (20 mg total) by mouth 2 (two) times daily., Disp: 60 tablet, Rfl: 11    linaCLOtide (LINZESS) 145 mcg Cap capsule, Take 145 mcg by mouth once daily., Disp: , Rfl:     neomycin-polymyxin-hydrocortisone (CORTISPORIN) 3.5-10,000-1 mg/mL-unit/mL-% otic suspension, Place 3 drops into the left ear 3 (three) times daily. for 7 days, Disp: 10 mL, Rfl: 0    pravastatin (PRAVACHOL) 40 MG tablet, Take 1 tablet (40 mg total) by mouth once daily., Disp: 30 tablet, Rfl: 11    traMADoL (ULTRAM) 50 mg tablet, Take 1 tablet (50 mg total) by mouth every 6 (six) hours., Disp: 60 tablet, Rfl: 0    Review of Systems   Constitutional: Negative for chills, fever and unexpected weight change.   HENT: Positive for ear pain. Negative for rhinorrhea and sore throat.    Eyes: Negative for pain and visual disturbance.   Respiratory: Negative for cough and shortness of breath.    Cardiovascular: Negative for chest pain, palpitations and leg swelling.   Gastrointestinal: Negative for abdominal pain, diarrhea, nausea and vomiting.   Genitourinary: Negative for difficulty urinating, hematuria and vaginal bleeding.   Musculoskeletal: Negative for arthralgias.   Skin: Negative for rash.   Neurological: Negative for dizziness, weakness and headaches.   Psychiatric/Behavioral: Negative for agitation and sleep disturbance. The patient is not nervous/anxious.           Objective:      Vitals:    06/04/20 1431   BP: 132/78   Pulse: 88   Temp: 98.2 °F (36.8 °C)   Weight: 97.1 kg (214 lb)   Height: 5' 3.5" (1.613 m)     Body mass index is 37.31 kg/m².  Physical Exam   Constitutional: She is oriented to person, place, and time. She appears well-developed and well-nourished.   HENT:   Right Ear: External ear normal. Tympanic membrane is not perforated and " not bulging.   Left Ear: External ear normal. Tympanic membrane is not perforated and not bulging.   Mouth/Throat: Oropharynx is clear and moist.   Left ear canal with small abrasion. Tm wnl   Eyes: Pupils are equal, round, and reactive to light. Conjunctivae are normal.   Neck: Normal range of motion. Neck supple. No JVD present.   Cardiovascular: Normal rate and regular rhythm.   No murmur heard.  Pulmonary/Chest: Effort normal and breath sounds normal.   Abdominal: Soft. Bowel sounds are normal.   Musculoskeletal: Normal range of motion. She exhibits no edema or deformity.   Lymphadenopathy:     She has no cervical adenopathy.   Neurological: She is alert and oriented to person, place, and time. Gait normal.   Skin: Skin is warm, dry and intact. No rash noted.   Psychiatric: She has a normal mood and affect. Her speech is normal and behavior is normal.         Assessment:       1. Otitis externa, unspecified chronicity, unspecified laterality, unspecified type    2. Gastroesophageal reflux disease, esophagitis presence not specified    3. Hyperlipidemia, unspecified hyperlipidemia type    4. Hypertension, unspecified type    5. Long term (current) use of anticoagulants    6. Coagulation disorder         Plan:       Otitis externa, unspecified chronicity, unspecified laterality, unspecified type    Gastroesophageal reflux disease, esophagitis presence not specified    Hyperlipidemia, unspecified hyperlipidemia type    Hypertension, unspecified type    Long term (current) use of anticoagulants    Coagulation disorder    Other orders  -     neomycin-polymyxin-hydrocortisone (CORTISPORIN) 3.5-10,000-1 mg/mL-unit/mL-% otic suspension; Place 3 drops into the left ear 3 (three) times daily. for 7 days  Dispense: 10 mL; Refill: 0      Follow up in about 4 weeks (around 7/2/2020) for medication management.

## 2020-06-05 ENCOUNTER — PATIENT MESSAGE (OUTPATIENT)
Dept: FAMILY MEDICINE | Facility: CLINIC | Age: 60
End: 2020-06-05

## 2020-06-08 RX ORDER — PHENTERMINE HYDROCHLORIDE 37.5 MG/1
37.5 TABLET ORAL
Qty: 30 TABLET | Refills: 0 | Status: SHIPPED | OUTPATIENT
Start: 2020-06-08 | End: 2020-07-06 | Stop reason: SDUPTHER

## 2020-06-09 ENCOUNTER — TELEPHONE (OUTPATIENT)
Dept: FAMILY MEDICINE | Facility: CLINIC | Age: 60
End: 2020-06-09

## 2020-06-09 NOTE — TELEPHONE ENCOUNTER
----- Message from Aminata Marshall sent at 6/9/2020  8:16 AM CDT -----  VM @ 5:00 p.m 6-8-20 calling for Fentanyl stated she received a portal msg saying it was sent.  She wants it at [facility] [place] [facility] [address]  #  [phone]

## 2020-06-22 DIAGNOSIS — M54.50 LOW BACK PAIN, UNSPECIFIED BACK PAIN LATERALITY, UNSPECIFIED CHRONICITY, UNSPECIFIED WHETHER SCIATICA PRESENT: ICD-10-CM

## 2020-06-22 RX ORDER — CYCLOBENZAPRINE HCL 10 MG
10 TABLET ORAL 3 TIMES DAILY PRN
Qty: 90 TABLET | Refills: 0 | Status: SHIPPED | OUTPATIENT
Start: 2020-06-22 | End: 2020-07-06 | Stop reason: SDUPTHER

## 2020-06-22 RX ORDER — DULOXETIN HYDROCHLORIDE 30 MG/1
30 CAPSULE, DELAYED RELEASE ORAL DAILY
Qty: 30 CAPSULE | Refills: 2 | Status: SHIPPED | OUTPATIENT
Start: 2020-06-28 | End: 2020-10-01 | Stop reason: SDUPTHER

## 2020-06-22 RX ORDER — TRAMADOL HYDROCHLORIDE 50 MG/1
50 TABLET ORAL EVERY 6 HOURS
Qty: 60 TABLET | Refills: 0 | Status: SHIPPED | OUTPATIENT
Start: 2020-06-22 | End: 2020-07-06 | Stop reason: SDUPTHER

## 2020-06-23 ENCOUNTER — TELEPHONE (OUTPATIENT)
Dept: FAMILY MEDICINE | Facility: CLINIC | Age: 60
End: 2020-06-23

## 2020-06-23 NOTE — TELEPHONE ENCOUNTER
Spoke to Tita with [facility]. She stated pt got a 2 day supply of [place] from dentist but we called in Tramadol. Wants to know if we want to fill or hold x 2 days. Advised to hold & fill Tramadol on Thursday.

## 2020-06-23 NOTE — TELEPHONE ENCOUNTER
----- Message from Fabio Bragg sent at 6/23/2020  9:05 AM CDT -----  Regarding: pharm call back  Pharm is needing clarification  on a med   [facility] [address] pharm tech jesus manuel [phone]

## 2020-06-24 ENCOUNTER — TELEPHONE (OUTPATIENT)
Dept: FAMILY MEDICINE | Facility: CLINIC | Age: 60
End: 2020-06-24

## 2020-06-24 NOTE — TELEPHONE ENCOUNTER
Spoke to pt regarding her appt on 7/6. Offered virtual visit. Pt wants in office appt/advised to wear mask/call upon arrival

## 2020-07-06 ENCOUNTER — OFFICE VISIT (OUTPATIENT)
Dept: FAMILY MEDICINE | Facility: CLINIC | Age: 60
End: 2020-07-06
Payer: COMMERCIAL

## 2020-07-06 VITALS
SYSTOLIC BLOOD PRESSURE: 116 MMHG | TEMPERATURE: 98 F | BODY MASS INDEX: 34.49 KG/M2 | HEIGHT: 64 IN | DIASTOLIC BLOOD PRESSURE: 74 MMHG | HEART RATE: 80 BPM | WEIGHT: 202 LBS

## 2020-07-06 DIAGNOSIS — K21.9 GASTROESOPHAGEAL REFLUX DISEASE, ESOPHAGITIS PRESENCE NOT SPECIFIED: ICD-10-CM

## 2020-07-06 DIAGNOSIS — M54.50 LOW BACK PAIN, UNSPECIFIED BACK PAIN LATERALITY, UNSPECIFIED CHRONICITY, UNSPECIFIED WHETHER SCIATICA PRESENT: ICD-10-CM

## 2020-07-06 DIAGNOSIS — I10 HYPERTENSION, UNSPECIFIED TYPE: ICD-10-CM

## 2020-07-06 DIAGNOSIS — E78.5 HYPERLIPIDEMIA, UNSPECIFIED HYPERLIPIDEMIA TYPE: ICD-10-CM

## 2020-07-06 DIAGNOSIS — D68.9 COAGULATION DISORDER: Primary | ICD-10-CM

## 2020-07-06 DIAGNOSIS — E66.9 OBESITY, UNSPECIFIED CLASSIFICATION, UNSPECIFIED OBESITY TYPE, UNSPECIFIED WHETHER SERIOUS COMORBIDITY PRESENT: ICD-10-CM

## 2020-07-06 DIAGNOSIS — F41.9 ANXIETY DISORDER, UNSPECIFIED TYPE: ICD-10-CM

## 2020-07-06 PROCEDURE — 99214 OFFICE O/P EST MOD 30 MIN: CPT | Mod: S$GLB,,, | Performed by: NURSE PRACTITIONER

## 2020-07-06 PROCEDURE — 3078F PR MOST RECENT DIASTOLIC BLOOD PRESSURE < 80 MM HG: ICD-10-PCS | Mod: S$GLB,,, | Performed by: NURSE PRACTITIONER

## 2020-07-06 PROCEDURE — 3074F SYST BP LT 130 MM HG: CPT | Mod: S$GLB,,, | Performed by: NURSE PRACTITIONER

## 2020-07-06 PROCEDURE — 3078F DIAST BP <80 MM HG: CPT | Mod: S$GLB,,, | Performed by: NURSE PRACTITIONER

## 2020-07-06 PROCEDURE — 3008F BODY MASS INDEX DOCD: CPT | Mod: S$GLB,,, | Performed by: NURSE PRACTITIONER

## 2020-07-06 PROCEDURE — 3074F PR MOST RECENT SYSTOLIC BLOOD PRESSURE < 130 MM HG: ICD-10-PCS | Mod: S$GLB,,, | Performed by: NURSE PRACTITIONER

## 2020-07-06 PROCEDURE — 99214 PR OFFICE/OUTPT VISIT, EST, LEVL IV, 30-39 MIN: ICD-10-PCS | Mod: S$GLB,,, | Performed by: NURSE PRACTITIONER

## 2020-07-06 PROCEDURE — 3008F PR BODY MASS INDEX (BMI) DOCUMENTED: ICD-10-PCS | Mod: S$GLB,,, | Performed by: NURSE PRACTITIONER

## 2020-07-06 RX ORDER — HYDROCODONE BITARTRATE AND ACETAMINOPHEN 7.5; 325 MG/1; MG/1
1 TABLET ORAL EVERY 6 HOURS PRN
COMMUNITY
Start: 2020-06-22 | End: 2020-10-01

## 2020-07-06 RX ORDER — APIXABAN 5 MG/1
5 TABLET, FILM COATED ORAL 2 TIMES DAILY
Qty: 180 TABLET | Refills: 1 | Status: SHIPPED | OUTPATIENT
Start: 2020-07-06 | End: 2021-02-15 | Stop reason: SDUPTHER

## 2020-07-06 RX ORDER — PANTOPRAZOLE SODIUM 40 MG/1
1 TABLET, DELAYED RELEASE ORAL DAILY
COMMUNITY
Start: 2020-05-22 | End: 2021-05-31 | Stop reason: SDUPTHER

## 2020-07-06 RX ORDER — AMOXICILLIN 500 MG/1
CAPSULE ORAL
COMMUNITY
Start: 2020-06-22 | End: 2020-07-06

## 2020-07-06 RX ORDER — CYCLOBENZAPRINE HCL 10 MG
10 TABLET ORAL 3 TIMES DAILY PRN
Qty: 90 TABLET | Refills: 0 | Status: SHIPPED | OUTPATIENT
Start: 2020-07-06 | End: 2020-08-10 | Stop reason: SDUPTHER

## 2020-07-06 NOTE — PROGRESS NOTES
SUBJECTIVE:    Patient ID: Jessica Mitchell is a 59 y.o. female.    Chief Complaint: Medication Refill (new rx check, weight check, brought bottles, Mammo declined for now// SW)    59 YEAR OLD FEMALE PRESENTS FOR FOLLOW UP. WAS STARTED ON PHENTERMINE PER DR. KAUFMAN AT LAST OFFICE VISIT. TODAY 12LB WEIGHT LOSS. FEELS GREAT. NO SIDE EFFECTS. NO CHEST PAIN. NO SHORTNESS OF BREATH. REFLUX SYMPTOMS HAVE RESOLVED. WOULD LIKE TO CONTINUE. PLANS TO INCORPORATE WALKING      Office Visit on 03/16/2020   Component Date Value Ref Range Status    WBC 03/16/2020 14.4* 3.8 - 10.8 Thousand/uL Final    RBC 03/16/2020 5.10  3.80 - 5.10 Million/uL Final    Hemoglobin 03/16/2020 14.6  11.7 - 15.5 g/dL Final    Hematocrit 03/16/2020 44.0  35.0 - 45.0 % Final    Mean Corpuscular Volume 03/16/2020 86.3  80.0 - 100.0 fL Final    Mean Corpuscular Hemoglobin 03/16/2020 28.6  27.0 - 33.0 pg Final    Mean Corpuscular Hemoglobin Conc 03/16/2020 33.2  32.0 - 36.0 g/dL Final    RDW 03/16/2020 12.4  11.0 - 15.0 % Final    Platelets 03/16/2020 254  140 - 400 Thousand/uL Final    MPV 03/16/2020 10.1  7.5 - 12.5 fL Final    Neutrophils Absolute 03/16/2020 11,333* 1,500 - 7,800 cells/uL Final    Lymph # 03/16/2020 2,203  850 - 3,900 cells/uL Final    Mono # 03/16/2020 763  200 - 950 cells/uL Final    Eos # 03/16/2020 58  15 - 500 cells/uL Final    Baso # 03/16/2020 43  0 - 200 cells/uL Final    Neutrophils Relative 03/16/2020 78.7  % Final    Lymph% 03/16/2020 15.3  % Final    Mono% 03/16/2020 5.3  % Final    Eosinophil% 03/16/2020 0.4  % Final    Basophil% 03/16/2020 0.3  % Final    Glucose 03/16/2020 108  65 - 139 mg/dL Final    BUN, Bld 03/16/2020 13  7 - 25 mg/dL Final    Creatinine 03/16/2020 0.86  0.50 - 1.05 mg/dL Final    eGFR if non African American 03/16/2020 74  > OR = 60 mL/min/1.73m2 Final    eGFR if African American 03/16/2020 86  > OR = 60 mL/min/1.73m2 Final    BUN/Creatinine Ratio 03/16/2020 NOT APPLICABLE   6 - 22 (calc) Final    Sodium 03/16/2020 139  135 - 146 mmol/L Final    Potassium 03/16/2020 3.7  3.5 - 5.3 mmol/L Final    Chloride 03/16/2020 104  98 - 110 mmol/L Final    CO2 03/16/2020 26  20 - 32 mmol/L Final    Calcium 03/16/2020 9.2  8.6 - 10.4 mg/dL Final    Total Protein 03/16/2020 7.1  6.1 - 8.1 g/dL Final    Albumin 03/16/2020 3.8  3.6 - 5.1 g/dL Final    Globulin, Total 03/16/2020 3.3  1.9 - 3.7 g/dL (calc) Final    Albumin/Globulin Ratio 03/16/2020 1.2  1.0 - 2.5 (calc) Final    Total Bilirubin 03/16/2020 0.4  0.2 - 1.2 mg/dL Final    Alkaline Phosphatase 03/16/2020 86  37 - 153 U/L Final    AST 03/16/2020 23  10 - 35 U/L Final    ALT 03/16/2020 28  6 - 29 U/L Final    Amylase 03/16/2020 31  21 - 101 U/L Final    Lipase 03/16/2020 23  7 - 60 U/L Final       Past Medical History:   Diagnosis Date    Adrenal tumor     DVT (deep venous thrombosis) 2012    Hiatal hernia     HTN (hypertension)     Multiple thyroid nodules     two    Stomach ulcer     Stroke     at 31 years old     Past Surgical History:   Procedure Laterality Date    Bilatweral Tubal ligation      ESOPHAGEAL DILATION       History reviewed. No pertinent family history.    Marital Status:   Alcohol History:  reports current alcohol use.  Tobacco History:  reports that she has quit smoking. She has never used smokeless tobacco.  Drug History:  reports no history of drug use.    Review of patient's allergies indicates:   Allergen Reactions    Codeine Itching    Codeine sulfate      Other reaction(s): Unknown    Lisinopril Other (See Comments)     cough  Other reaction(s): Unknown       Current Outpatient Medications:     amLODIPine (NORVASC) 5 MG tablet, Take 1 tablet (5 mg total) by mouth once daily., Disp: 90 tablet, Rfl: 1    aspirin (ECOTRIN) 81 MG EC tablet, Take 81 mg by mouth 2 (two) times daily., Disp: , Rfl:     cyclobenzaprine (FLEXERIL) 10 MG tablet, Take 1 tablet (10 mg total) by mouth 3 (three)  "times daily as needed for Muscle spasms., Disp: 90 tablet, Rfl: 0    DULoxetine (CYMBALTA) 30 MG capsule, Take 1 capsule (30 mg total) by mouth once daily., Disp: 30 capsule, Rfl: 2    ELIQUIS 5 mg Tab, Take 1 tablet (5 mg total) by mouth 2 (two) times daily., Disp: 180 tablet, Rfl: 1    HYDROcodone-acetaminophen (NORCO) 7.5-325 mg per tablet, Take 1 tablet by mouth every 6 (six) hours as needed., Disp: , Rfl:     linaCLOtide (LINZESS) 145 mcg Cap capsule, Take 145 mcg by mouth once daily., Disp: , Rfl:     pantoprazole (PROTONIX) 40 MG tablet, Take 1 tablet by mouth once daily., Disp: , Rfl:     phentermine (ADIPEX-P) 37.5 mg tablet, Take 1 tablet (37.5 mg total) by mouth before breakfast., Disp: 30 tablet, Rfl: 0    pravastatin (PRAVACHOL) 40 MG tablet, Take 1 tablet (40 mg total) by mouth once daily., Disp: 30 tablet, Rfl: 11    traMADoL (ULTRAM) 50 mg tablet, Take 1 tablet (50 mg total) by mouth every 6 (six) hours., Disp: 60 tablet, Rfl: 0    Review of Systems   Constitutional: Positive for appetite change. Negative for chills, fever and unexpected weight change.   Respiratory: Negative for cough and shortness of breath.    Cardiovascular: Negative for chest pain, palpitations and leg swelling.   Gastrointestinal: Negative for abdominal pain, diarrhea, nausea and vomiting.   Genitourinary: Negative for difficulty urinating.   Musculoskeletal: Negative for arthralgias.   Skin: Negative for rash.   Neurological: Negative for dizziness, weakness and headaches.   Psychiatric/Behavioral: Negative for agitation and sleep disturbance. The patient is not nervous/anxious.           Objective:      Vitals:    07/06/20 1124   BP: 116/74   Pulse: 80   Temp: 98.2 °F (36.8 °C)   Weight: 91.6 kg (202 lb)   Height: 5' 3.5" (1.613 m)     Body mass index is 35.22 kg/m².  Physical Exam  Constitutional:       Appearance: She is well-developed.   Neck:      Vascular: No JVD.   Cardiovascular:      Rate and Rhythm: Normal " rate and regular rhythm.      Heart sounds: No murmur.   Pulmonary:      Effort: Pulmonary effort is normal.      Breath sounds: Normal breath sounds.   Musculoskeletal: Normal range of motion.   Lymphadenopathy:      Cervical: No cervical adenopathy.   Skin:     General: Skin is warm and dry.      Findings: No rash.   Neurological:      Mental Status: She is alert and oriented to person, place, and time.      Gait: Gait normal.   Psychiatric:         Speech: Speech normal.         Behavior: Behavior normal.           Assessment:       1. Coagulation disorder    2. Low back pain, unspecified back pain laterality, unspecified chronicity, unspecified whether sciatica present    3. Hypertension, unspecified type    4. Gastroesophageal reflux disease, esophagitis presence not specified    5. Hyperlipidemia, unspecified hyperlipidemia type    6. Anxiety disorder, unspecified type    7. Obesity, unspecified classification, unspecified obesity type, unspecified whether serious comorbidity present         Plan:       Coagulation disorder  -     ELIQUIS 5 mg Tab; Take 1 tablet (5 mg total) by mouth 2 (two) times daily.  Dispense: 180 tablet; Refill: 1    Low back pain, unspecified back pain laterality, unspecified chronicity, unspecified whether sciatica present  -     cyclobenzaprine (FLEXERIL) 10 MG tablet; Take 1 tablet (10 mg total) by mouth 3 (three) times daily as needed for Muscle spasms.  Dispense: 90 tablet; Refill: 0    Hypertension, unspecified type    Gastroesophageal reflux disease, esophagitis presence not specified    Hyperlipidemia, unspecified hyperlipidemia type    Anxiety disorder, unspecified type    Obesity, unspecified classification, unspecified obesity type, unspecified whether serious comorbidity present  Comments:  CONTINUE PHENTERMINE PER DR. KAUFMAN      Follow up in about 4 weeks (around 8/3/2020).

## 2020-07-08 RX ORDER — TRAMADOL HYDROCHLORIDE 50 MG/1
50 TABLET ORAL EVERY 6 HOURS
Qty: 60 TABLET | Refills: 0 | Status: SHIPPED | OUTPATIENT
Start: 2020-07-08 | End: 2020-08-10 | Stop reason: SDUPTHER

## 2020-07-08 RX ORDER — PHENTERMINE HYDROCHLORIDE 37.5 MG/1
37.5 TABLET ORAL
Qty: 30 TABLET | Refills: 0 | Status: SHIPPED | OUTPATIENT
Start: 2020-07-08 | End: 2020-08-07

## 2020-07-28 ENCOUNTER — TELEPHONE (OUTPATIENT)
Dept: FAMILY MEDICINE | Facility: CLINIC | Age: 60
End: 2020-07-28

## 2020-08-10 DIAGNOSIS — M54.50 LOW BACK PAIN, UNSPECIFIED BACK PAIN LATERALITY, UNSPECIFIED CHRONICITY, UNSPECIFIED WHETHER SCIATICA PRESENT: ICD-10-CM

## 2020-08-10 RX ORDER — TRAMADOL HYDROCHLORIDE 50 MG/1
50 TABLET ORAL EVERY 6 HOURS
Qty: 60 TABLET | Refills: 1 | Status: SHIPPED | OUTPATIENT
Start: 2020-08-10 | End: 2020-11-04 | Stop reason: SDUPTHER

## 2020-08-10 RX ORDER — CYCLOBENZAPRINE HCL 10 MG
10 TABLET ORAL 3 TIMES DAILY PRN
Qty: 90 TABLET | Refills: 0 | Status: SHIPPED | OUTPATIENT
Start: 2020-08-10 | End: 2020-11-04 | Stop reason: SDUPTHER

## 2020-08-17 ENCOUNTER — OFFICE VISIT (OUTPATIENT)
Dept: FAMILY MEDICINE | Facility: CLINIC | Age: 60
End: 2020-08-17
Payer: COMMERCIAL

## 2020-08-17 VITALS
HEART RATE: 84 BPM | HEIGHT: 64 IN | DIASTOLIC BLOOD PRESSURE: 82 MMHG | BODY MASS INDEX: 33.8 KG/M2 | SYSTOLIC BLOOD PRESSURE: 132 MMHG | TEMPERATURE: 98 F | WEIGHT: 198 LBS

## 2020-08-17 DIAGNOSIS — E78.5 HYPERLIPIDEMIA, UNSPECIFIED HYPERLIPIDEMIA TYPE: ICD-10-CM

## 2020-08-17 DIAGNOSIS — D68.9 COAGULATION DISORDER: ICD-10-CM

## 2020-08-17 DIAGNOSIS — E66.9 OBESITY, UNSPECIFIED CLASSIFICATION, UNSPECIFIED OBESITY TYPE, UNSPECIFIED WHETHER SERIOUS COMORBIDITY PRESENT: ICD-10-CM

## 2020-08-17 DIAGNOSIS — I10 HYPERTENSION, UNSPECIFIED TYPE: ICD-10-CM

## 2020-08-17 DIAGNOSIS — F41.9 ANXIETY DISORDER, UNSPECIFIED TYPE: ICD-10-CM

## 2020-08-17 DIAGNOSIS — Z79.01 LONG TERM (CURRENT) USE OF ANTICOAGULANTS: ICD-10-CM

## 2020-08-17 DIAGNOSIS — Z12.31 OTHER SCREENING MAMMOGRAM: Primary | ICD-10-CM

## 2020-08-17 DIAGNOSIS — K21.9 GASTROESOPHAGEAL REFLUX DISEASE, ESOPHAGITIS PRESENCE NOT SPECIFIED: ICD-10-CM

## 2020-08-17 PROCEDURE — 99214 OFFICE O/P EST MOD 30 MIN: CPT | Mod: S$GLB,,, | Performed by: NURSE PRACTITIONER

## 2020-08-17 PROCEDURE — 3008F BODY MASS INDEX DOCD: CPT | Mod: S$GLB,,, | Performed by: NURSE PRACTITIONER

## 2020-08-17 PROCEDURE — 3008F PR BODY MASS INDEX (BMI) DOCUMENTED: ICD-10-PCS | Mod: S$GLB,,, | Performed by: NURSE PRACTITIONER

## 2020-08-17 PROCEDURE — 99214 PR OFFICE/OUTPT VISIT, EST, LEVL IV, 30-39 MIN: ICD-10-PCS | Mod: S$GLB,,, | Performed by: NURSE PRACTITIONER

## 2020-08-17 PROCEDURE — 3079F PR MOST RECENT DIASTOLIC BLOOD PRESSURE 80-89 MM HG: ICD-10-PCS | Mod: S$GLB,,, | Performed by: NURSE PRACTITIONER

## 2020-08-17 PROCEDURE — 3075F PR MOST RECENT SYSTOLIC BLOOD PRESS GE 130-139MM HG: ICD-10-PCS | Mod: S$GLB,,, | Performed by: NURSE PRACTITIONER

## 2020-08-17 PROCEDURE — 3075F SYST BP GE 130 - 139MM HG: CPT | Mod: S$GLB,,, | Performed by: NURSE PRACTITIONER

## 2020-08-17 PROCEDURE — 3079F DIAST BP 80-89 MM HG: CPT | Mod: S$GLB,,, | Performed by: NURSE PRACTITIONER

## 2020-08-17 RX ORDER — PHENTERMINE HYDROCHLORIDE 37.5 MG/1
37.5 TABLET ORAL
Qty: 30 TABLET | Refills: 0 | Status: SHIPPED | OUTPATIENT
Start: 2020-08-17 | End: 2020-09-16

## 2020-08-17 NOTE — PROGRESS NOTES
SUBJECTIVE:    Patient ID: Jessica Mitchell is a 60 y.o. female.    Chief Complaint: Weight Check (brought bottles, states she does not need refills on anything other than phentermine // mammogram order in -ac )    59 YEAR OLD FEMALE PRESENTS FOR FOLLOW UP. WAS STARTED ON PHENTERMINE PER DR. KAUFMAN 2 MONTHS AGO.  TODAY 16LB WEIGHT LOSS TOTAL. FEELS GREAT. NO SIDE EFFECTS. NO CHEST PAIN. NO SHORTNESS OF BREATH. REFLUX SYMPTOMS HAVE RESOLVED. WOULD LIKE TO CONTINUE. PLANS TO INCORPORATE WALKING. ACTIVE JOB CLEANING HOUSES      Office Visit on 03/16/2020   Component Date Value Ref Range Status    WBC 03/16/2020 14.4* 3.8 - 10.8 Thousand/uL Final    RBC 03/16/2020 5.10  3.80 - 5.10 Million/uL Final    Hemoglobin 03/16/2020 14.6  11.7 - 15.5 g/dL Final    Hematocrit 03/16/2020 44.0  35.0 - 45.0 % Final    Mean Corpuscular Volume 03/16/2020 86.3  80.0 - 100.0 fL Final    Mean Corpuscular Hemoglobin 03/16/2020 28.6  27.0 - 33.0 pg Final    Mean Corpuscular Hemoglobin Conc 03/16/2020 33.2  32.0 - 36.0 g/dL Final    RDW 03/16/2020 12.4  11.0 - 15.0 % Final    Platelets 03/16/2020 254  140 - 400 Thousand/uL Final    MPV 03/16/2020 10.1  7.5 - 12.5 fL Final    Neutrophils Absolute 03/16/2020 11,333* 1,500 - 7,800 cells/uL Final    Lymph # 03/16/2020 2,203  850 - 3,900 cells/uL Final    Mono # 03/16/2020 763  200 - 950 cells/uL Final    Eos # 03/16/2020 58  15 - 500 cells/uL Final    Baso # 03/16/2020 43  0 - 200 cells/uL Final    Neutrophils Relative 03/16/2020 78.7  % Final    Lymph% 03/16/2020 15.3  % Final    Mono% 03/16/2020 5.3  % Final    Eosinophil% 03/16/2020 0.4  % Final    Basophil% 03/16/2020 0.3  % Final    Glucose 03/16/2020 108  65 - 139 mg/dL Final    BUN, Bld 03/16/2020 13  7 - 25 mg/dL Final    Creatinine 03/16/2020 0.86  0.50 - 1.05 mg/dL Final    eGFR if non African American 03/16/2020 74  > OR = 60 mL/min/1.73m2 Final    eGFR if African American 03/16/2020 86  > OR = 60  mL/min/1.73m2 Final    BUN/Creatinine Ratio 03/16/2020 NOT APPLICABLE  6 - 22 (calc) Final    Sodium 03/16/2020 139  135 - 146 mmol/L Final    Potassium 03/16/2020 3.7  3.5 - 5.3 mmol/L Final    Chloride 03/16/2020 104  98 - 110 mmol/L Final    CO2 03/16/2020 26  20 - 32 mmol/L Final    Calcium 03/16/2020 9.2  8.6 - 10.4 mg/dL Final    Total Protein 03/16/2020 7.1  6.1 - 8.1 g/dL Final    Albumin 03/16/2020 3.8  3.6 - 5.1 g/dL Final    Globulin, Total 03/16/2020 3.3  1.9 - 3.7 g/dL (calc) Final    Albumin/Globulin Ratio 03/16/2020 1.2  1.0 - 2.5 (calc) Final    Total Bilirubin 03/16/2020 0.4  0.2 - 1.2 mg/dL Final    Alkaline Phosphatase 03/16/2020 86  37 - 153 U/L Final    AST 03/16/2020 23  10 - 35 U/L Final    ALT 03/16/2020 28  6 - 29 U/L Final    Amylase 03/16/2020 31  21 - 101 U/L Final    Lipase 03/16/2020 23  7 - 60 U/L Final       Past Medical History:   Diagnosis Date    Adrenal tumor     DVT (deep venous thrombosis) 2012    Hiatal hernia     HTN (hypertension)     Multiple thyroid nodules     two    Stomach ulcer     Stroke     at 31 years old     Social History     Socioeconomic History    Marital status:      Spouse name: Not on file    Number of children: Not on file    Years of education: Not on file    Highest education level: Not on file   Occupational History    Not on file   Social Needs    Financial resource strain: Not very hard    Food insecurity     Worry: Never true     Inability: Never true    Transportation needs     Medical: No     Non-medical: No   Tobacco Use    Smoking status: Former Smoker    Smokeless tobacco: Never Used   Substance and Sexual Activity    Alcohol use: Yes     Alcohol/week: 0.0 standard drinks     Frequency: Monthly or less     Drinks per session: 1 or 2     Binge frequency: Never     Comment: socially    Drug use: No    Sexual activity: Not on file   Lifestyle    Physical activity     Days per week: 5 days     Minutes per  session: 40 min    Stress: Only a little   Relationships    Social connections     Talks on phone: Three times a week     Gets together: Three times a week     Attends [employer] service: Not on file     Active member of club or organization: No     Attends meetings of clubs or organizations: Not on file     Relationship status:    Other Topics Concern    Are you pregnant or think you may be? Not Asked    Breast-feeding Not Asked   Social History Narrative    Not on file     Past Surgical History:   Procedure Laterality Date    Bilatweral Tubal ligation      ESOPHAGEAL DILATION       History reviewed. No pertinent family history.    Review of patient's allergies indicates:   Allergen Reactions    Codeine Itching    Codeine sulfate      Other reaction(s): Unknown    Lisinopril Other (See Comments)     cough  Other reaction(s): Unknown       Current Outpatient Medications:     amLODIPine (NORVASC) 5 MG tablet, Take 1 tablet (5 mg total) by mouth once daily., Disp: 90 tablet, Rfl: 1    cyclobenzaprine (FLEXERIL) 10 MG tablet, Take 1 tablet (10 mg total) by mouth 3 (three) times daily as needed for Muscle spasms., Disp: 90 tablet, Rfl: 0    DULoxetine (CYMBALTA) 30 MG capsule, Take 1 capsule (30 mg total) by mouth once daily., Disp: 30 capsule, Rfl: 2    ELIQUIS 5 mg Tab, Take 1 tablet (5 mg total) by mouth 2 (two) times daily., Disp: 180 tablet, Rfl: 1    linaCLOtide (LINZESS) 145 mcg Cap capsule, Take 145 mcg by mouth once daily., Disp: , Rfl:     pantoprazole (PROTONIX) 40 MG tablet, Take 1 tablet by mouth once daily., Disp: , Rfl:     pravastatin (PRAVACHOL) 40 MG tablet, Take 1 tablet (40 mg total) by mouth once daily., Disp: 30 tablet, Rfl: 11    aspirin (ECOTRIN) 81 MG EC tablet, Take 81 mg by mouth 2 (two) times daily., Disp: , Rfl:     HYDROcodone-acetaminophen (NORCO) 7.5-325 mg per tablet, Take 1 tablet by mouth every 6 (six) hours as needed., Disp: , Rfl:     traMADoL (ULTRAM) 50 mg  "tablet, Take 1 tablet (50 mg total) by mouth every 6 (six) hours., Disp: 60 tablet, Rfl: 1    Review of Systems   Constitutional: Positive for appetite change. Negative for chills, fever and unexpected weight change.   Respiratory: Negative for cough and shortness of breath.    Cardiovascular: Negative for chest pain, palpitations and leg swelling.   Gastrointestinal: Negative for abdominal pain, diarrhea, nausea and vomiting.   Genitourinary: Negative for difficulty urinating.   Musculoskeletal: Negative for arthralgias.   Skin: Negative for rash.   Neurological: Negative for dizziness, weakness and headaches.   Psychiatric/Behavioral: Negative for agitation and sleep disturbance. The patient is not nervous/anxious.           Objective:      Vitals:    08/17/20 0855   BP: 132/82   Pulse: 84   Temp: 98.2 °F (36.8 °C)   Weight: 89.8 kg (198 lb)   Height: 5' 3.5" (1.613 m)     Physical Exam  Constitutional:       Appearance: She is well-developed.   Neck:      Vascular: No JVD.   Cardiovascular:      Rate and Rhythm: Normal rate and regular rhythm.      Heart sounds: No murmur.   Pulmonary:      Effort: Pulmonary effort is normal.      Breath sounds: Normal breath sounds.   Musculoskeletal: Normal range of motion.   Lymphadenopathy:      Cervical: No cervical adenopathy.   Skin:     General: Skin is warm and dry.      Findings: No rash.   Neurological:      Mental Status: She is alert and oriented to person, place, and time.      Gait: Gait normal.   Psychiatric:         Speech: Speech normal.         Behavior: Behavior normal.           Assessment:       1. Other screening mammogram    2. Anxiety disorder, unspecified type    3. Hypertension, unspecified type    4. Hyperlipidemia, unspecified hyperlipidemia type    5. Coagulation disorder    6. Long term (current) use of anticoagulants    7. Gastroesophageal reflux disease, esophagitis presence not specified    8. Obesity, unspecified classification, unspecified " obesity type, unspecified whether serious comorbidity present         Plan:       Other screening mammogram  -     Mammo Digital Screening Bilat w/ [facility]; Future; Expected date: 08/17/2020    Anxiety disorder, unspecified type    Hypertension, unspecified type    Hyperlipidemia, unspecified hyperlipidemia type    Coagulation disorder    Long term (current) use of anticoagulants    Gastroesophageal reflux disease, esophagitis presence not specified    Obesity, unspecified classification, unspecified obesity type, unspecified whether serious comorbidity present  Comments:  PHENTERMINE PER dR. KAUFMAN      Follow up in about 4 weeks (around 9/14/2020) for medication management.        8/17/2020 Angi Barrett

## 2020-09-14 ENCOUNTER — TELEPHONE (OUTPATIENT)
Dept: FAMILY MEDICINE | Facility: CLINIC | Age: 60
End: 2020-09-14

## 2020-09-21 DIAGNOSIS — M54.50 LOW BACK PAIN, UNSPECIFIED BACK PAIN LATERALITY, UNSPECIFIED CHRONICITY, UNSPECIFIED WHETHER SCIATICA PRESENT: ICD-10-CM

## 2020-09-21 RX ORDER — CYCLOBENZAPRINE HCL 10 MG
10 TABLET ORAL 3 TIMES DAILY PRN
Qty: 90 TABLET | Refills: 0 | Status: CANCELLED | OUTPATIENT
Start: 2020-09-21

## 2020-09-24 ENCOUNTER — PATIENT MESSAGE (OUTPATIENT)
Dept: FAMILY MEDICINE | Facility: CLINIC | Age: 60
End: 2020-09-24

## 2020-10-01 ENCOUNTER — OFFICE VISIT (OUTPATIENT)
Dept: FAMILY MEDICINE | Facility: CLINIC | Age: 60
End: 2020-10-01
Payer: COMMERCIAL

## 2020-10-01 ENCOUNTER — TELEPHONE (OUTPATIENT)
Dept: FAMILY MEDICINE | Facility: CLINIC | Age: 60
End: 2020-10-01

## 2020-10-01 VITALS
HEART RATE: 68 BPM | BODY MASS INDEX: 33.29 KG/M2 | WEIGHT: 195 LBS | SYSTOLIC BLOOD PRESSURE: 110 MMHG | DIASTOLIC BLOOD PRESSURE: 68 MMHG | TEMPERATURE: 98 F | HEIGHT: 64 IN

## 2020-10-01 DIAGNOSIS — E66.9 CLASS 1 OBESITY WITH BODY MASS INDEX (BMI) OF 34.0 TO 34.9 IN ADULT, UNSPECIFIED OBESITY TYPE, UNSPECIFIED WHETHER SERIOUS COMORBIDITY PRESENT: ICD-10-CM

## 2020-10-01 DIAGNOSIS — E78.5 HYPERLIPIDEMIA, UNSPECIFIED HYPERLIPIDEMIA TYPE: ICD-10-CM

## 2020-10-01 DIAGNOSIS — D68.9 COAGULATION DISORDER: ICD-10-CM

## 2020-10-01 DIAGNOSIS — Z23 NEED FOR INFLUENZA VACCINATION: Primary | ICD-10-CM

## 2020-10-01 DIAGNOSIS — K21.9 GASTROESOPHAGEAL REFLUX DISEASE, UNSPECIFIED WHETHER ESOPHAGITIS PRESENT: ICD-10-CM

## 2020-10-01 DIAGNOSIS — F41.9 ANXIETY DISORDER, UNSPECIFIED TYPE: ICD-10-CM

## 2020-10-01 DIAGNOSIS — I10 HYPERTENSION, UNSPECIFIED TYPE: ICD-10-CM

## 2020-10-01 PROCEDURE — 3008F PR BODY MASS INDEX (BMI) DOCUMENTED: ICD-10-PCS | Mod: S$GLB,,, | Performed by: NURSE PRACTITIONER

## 2020-10-01 PROCEDURE — 3074F SYST BP LT 130 MM HG: CPT | Mod: S$GLB,,, | Performed by: NURSE PRACTITIONER

## 2020-10-01 PROCEDURE — 3078F DIAST BP <80 MM HG: CPT | Mod: S$GLB,,, | Performed by: NURSE PRACTITIONER

## 2020-10-01 PROCEDURE — 99214 PR OFFICE/OUTPT VISIT, EST, LEVL IV, 30-39 MIN: ICD-10-PCS | Mod: 25,S$GLB,, | Performed by: NURSE PRACTITIONER

## 2020-10-01 PROCEDURE — 90682 FLU VACCINE - QUADRIVALENT (RECOMBINANT) PRESERVATIVE FREE: ICD-10-PCS | Mod: S$GLB,,, | Performed by: NURSE PRACTITIONER

## 2020-10-01 PROCEDURE — 3008F BODY MASS INDEX DOCD: CPT | Mod: S$GLB,,, | Performed by: NURSE PRACTITIONER

## 2020-10-01 PROCEDURE — 3074F PR MOST RECENT SYSTOLIC BLOOD PRESSURE < 130 MM HG: ICD-10-PCS | Mod: S$GLB,,, | Performed by: NURSE PRACTITIONER

## 2020-10-01 PROCEDURE — 3078F PR MOST RECENT DIASTOLIC BLOOD PRESSURE < 80 MM HG: ICD-10-PCS | Mod: S$GLB,,, | Performed by: NURSE PRACTITIONER

## 2020-10-01 PROCEDURE — 90471 IMMUNIZATION ADMIN: CPT | Mod: S$GLB,,, | Performed by: NURSE PRACTITIONER

## 2020-10-01 PROCEDURE — 99214 OFFICE O/P EST MOD 30 MIN: CPT | Mod: 25,S$GLB,, | Performed by: NURSE PRACTITIONER

## 2020-10-01 PROCEDURE — 90682 RIV4 VACC RECOMBINANT DNA IM: CPT | Mod: S$GLB,,, | Performed by: NURSE PRACTITIONER

## 2020-10-01 PROCEDURE — 90471 FLU VACCINE - QUADRIVALENT (RECOMBINANT) PRESERVATIVE FREE: ICD-10-PCS | Mod: S$GLB,,, | Performed by: NURSE PRACTITIONER

## 2020-10-01 RX ORDER — DULOXETIN HYDROCHLORIDE 30 MG/1
30 CAPSULE, DELAYED RELEASE ORAL DAILY
Qty: 90 CAPSULE | Refills: 2 | Status: SHIPPED | OUTPATIENT
Start: 2020-10-01 | End: 2020-12-28 | Stop reason: SDUPTHER

## 2020-10-01 NOTE — TELEPHONE ENCOUNTER
----- Message from [facility] sent at 10/1/2019 12:21 PM CDT -----  Angi Barrett NP sent at 9/30/2019 11:16 PM CDT -----  Thyroid ultrasound shows multiple nodules essentially unchanged since 2016. Repeat in 1 year.They do see minimally enlarged lymph nodes. We will recheck these at office visit in 2 weeks.

## 2020-10-01 NOTE — PROGRESS NOTES
SUBJECTIVE:    Patient ID: Jessica Mitchell is a 60 y.o. female.    Chief Complaint: Weight Check (med refills. brought meds, getting flu shot-JM) and Hand Pain    59 YEAR OLD FEMALE PRESENTS FOR FOLLOW UP. WAS STARTED ON PHENTERMINE PER DR. KAUFMAN 3 MONTHS AGO.  TODAY 17LB WEIGHT LOSS TOTAL. FEELS GREAT. NO SIDE EFFECTS. NO CHEST PAIN. NO SHORTNESS OF BREATH. REFLUX SYMPTOMS HAVE RESOLVED. WOULD LIKE TO CONTINUE. PLANS TO INCORPORATE WALKING. ACTIVE JOB CLEANING HOUSES. WOULD LIKE TO DISCUSS OTHER WEIGHT MEDICATIONS.       No visits with results within 6 Month(s) from this visit.   Latest known visit with results is:   Office Visit on 03/16/2020   Component Date Value Ref Range Status    WBC 03/16/2020 14.4* 3.8 - 10.8 Thousand/uL Final    RBC 03/16/2020 5.10  3.80 - 5.10 Million/uL Final    Hemoglobin 03/16/2020 14.6  11.7 - 15.5 g/dL Final    Hematocrit 03/16/2020 44.0  35.0 - 45.0 % Final    Mean Corpuscular Volume 03/16/2020 86.3  80.0 - 100.0 fL Final    Mean Corpuscular Hemoglobin 03/16/2020 28.6  27.0 - 33.0 pg Final    Mean Corpuscular Hemoglobin Conc 03/16/2020 33.2  32.0 - 36.0 g/dL Final    RDW 03/16/2020 12.4  11.0 - 15.0 % Final    Platelets 03/16/2020 254  140 - 400 Thousand/uL Final    MPV 03/16/2020 10.1  7.5 - 12.5 fL Final    Neutrophils Absolute 03/16/2020 11,333* 1,500 - 7,800 cells/uL Final    Lymph # 03/16/2020 2,203  850 - 3,900 cells/uL Final    Mono # 03/16/2020 763  200 - 950 cells/uL Final    Eos # 03/16/2020 58  15 - 500 cells/uL Final    Baso # 03/16/2020 43  0 - 200 cells/uL Final    Neutrophils Relative 03/16/2020 78.7  % Final    Lymph% 03/16/2020 15.3  % Final    Mono% 03/16/2020 5.3  % Final    Eosinophil% 03/16/2020 0.4  % Final    Basophil% 03/16/2020 0.3  % Final    Glucose 03/16/2020 108  65 - 139 mg/dL Final    BUN, Bld 03/16/2020 13  7 - 25 mg/dL Final    Creatinine 03/16/2020 0.86  0.50 - 1.05 mg/dL Final    eGFR if non African American 03/16/2020 74   > OR = 60 mL/min/1.73m2 Final    eGFR if African American 03/16/2020 86  > OR = 60 mL/min/1.73m2 Final    BUN/Creatinine Ratio 03/16/2020 NOT APPLICABLE  6 - 22 (calc) Final    Sodium 03/16/2020 139  135 - 146 mmol/L Final    Potassium 03/16/2020 3.7  3.5 - 5.3 mmol/L Final    Chloride 03/16/2020 104  98 - 110 mmol/L Final    CO2 03/16/2020 26  20 - 32 mmol/L Final    Calcium 03/16/2020 9.2  8.6 - 10.4 mg/dL Final    Total Protein 03/16/2020 7.1  6.1 - 8.1 g/dL Final    Albumin 03/16/2020 3.8  3.6 - 5.1 g/dL Final    Globulin, Total 03/16/2020 3.3  1.9 - 3.7 g/dL (calc) Final    Albumin/Globulin Ratio 03/16/2020 1.2  1.0 - 2.5 (calc) Final    Total Bilirubin 03/16/2020 0.4  0.2 - 1.2 mg/dL Final    Alkaline Phosphatase 03/16/2020 86  37 - 153 U/L Final    AST 03/16/2020 23  10 - 35 U/L Final    ALT 03/16/2020 28  6 - 29 U/L Final    Amylase 03/16/2020 31  21 - 101 U/L Final    Lipase 03/16/2020 23  7 - 60 U/L Final       Past Medical History:   Diagnosis Date    Adrenal tumor     DVT (deep venous thrombosis) 2012    Hiatal hernia     HTN (hypertension)     Multiple thyroid nodules     two    Stomach ulcer     Stroke     at 31 years old     Social History     Socioeconomic History    Marital status:      Spouse name: Not on file    Number of children: Not on file    Years of education: Not on file    Highest education level: Not on file   Occupational History    Not on file   Social Needs    Financial resource strain: Not very hard    Food insecurity     Worry: Never true     Inability: Never true    Transportation needs     Medical: No     Non-medical: No   Tobacco Use    Smoking status: Former Smoker    Smokeless tobacco: Never Used   Substance and Sexual Activity    Alcohol use: Yes     Alcohol/week: 0.0 standard drinks     Frequency: Monthly or less     Drinks per session: 1 or 2     Binge frequency: Never     Comment: socially    Drug use: No    Sexual activity: Not  on file   Lifestyle    Physical activity     Days per week: 5 days     Minutes per session: 40 min    Stress: Only a little   Relationships    Social connections     Talks on phone: Three times a week     Gets together: Three times a week     Attends [employer] service: Not on file     Active member of club or organization: No     Attends meetings of clubs or organizations: Not on file     Relationship status:    Other Topics Concern    Are you pregnant or think you may be? Not Asked    Breast-feeding Not Asked   Social History Narrative    Not on file     Past Surgical History:   Procedure Laterality Date    Bilatweral Tubal ligation      ESOPHAGEAL DILATION       History reviewed. No pertinent family history.    Review of patient's allergies indicates:   Allergen Reactions    Codeine Itching    Codeine sulfate      Other reaction(s): Unknown    Lisinopril Other (See Comments)     cough  Other reaction(s): Unknown       Current Outpatient Medications:     amLODIPine (NORVASC) 5 MG tablet, Take 1 tablet (5 mg total) by mouth once daily., Disp: 90 tablet, Rfl: 1    aspirin (ECOTRIN) 81 MG EC tablet, Take 81 mg by mouth 2 (two) times daily., Disp: , Rfl:     cyclobenzaprine (FLEXERIL) 10 MG tablet, Take 1 tablet (10 mg total) by mouth 3 (three) times daily as needed for Muscle spasms., Disp: 90 tablet, Rfl: 0    DULoxetine (CYMBALTA) 30 MG capsule, Take 1 capsule (30 mg total) by mouth once daily., Disp: 90 capsule, Rfl: 2    ELIQUIS 5 mg Tab, Take 1 tablet (5 mg total) by mouth 2 (two) times daily., Disp: 180 tablet, Rfl: 1    linaCLOtide (LINZESS) 145 mcg Cap capsule, Take 145 mcg by mouth once daily., Disp: , Rfl:     pantoprazole (PROTONIX) 40 MG tablet, Take 1 tablet by mouth once daily., Disp: , Rfl:     pravastatin (PRAVACHOL) 40 MG tablet, Take 1 tablet (40 mg total) by mouth once daily., Disp: 30 tablet, Rfl: 11    traMADoL (ULTRAM) 50 mg tablet, Take 1 tablet (50 mg total) by mouth  "every 6 (six) hours., Disp: 60 tablet, Rfl: 1    naltrexone-bupropion (CONTRAVE) 8-90 mg TbSR, Take 1 tablet by mouth 2 (two) times daily., Disp: 60 tablet, Rfl: 0    Review of Systems   Constitutional: Positive for activity change and appetite change. Negative for chills, fever and unexpected weight change.   HENT: Negative for ear pain.    Respiratory: Negative for cough and shortness of breath.    Cardiovascular: Negative for chest pain, palpitations and leg swelling.   Gastrointestinal: Negative for abdominal pain and vomiting.   Musculoskeletal: Positive for arthralgias.   Skin: Negative for rash.   Neurological: Negative for dizziness, weakness and headaches.   Psychiatric/Behavioral: Negative for agitation and sleep disturbance. The patient is not nervous/anxious.           Objective:      Vitals:    10/01/20 0833   BP: 110/68   Pulse: 68   Temp: 97.8 °F (36.6 °C)   TempSrc: Temporal   Weight: 88.5 kg (195 lb)   Height: 5' 3.5" (1.613 m)     Physical Exam  Constitutional:       Appearance: She is well-developed.   HENT:      Right Ear: External ear normal.      Left Ear: External ear normal.   Eyes:      Conjunctiva/sclera: Conjunctivae normal.      Pupils: Pupils are equal, round, and reactive to light.   Neck:      Musculoskeletal: Normal range of motion and neck supple.      Vascular: No JVD.   Cardiovascular:      Rate and Rhythm: Normal rate and regular rhythm.      Heart sounds: No murmur.   Pulmonary:      Effort: Pulmonary effort is normal.      Breath sounds: Normal breath sounds.   Abdominal:      General: Bowel sounds are normal.      Palpations: Abdomen is soft.   Musculoskeletal: Normal range of motion.         General: No deformity.   Lymphadenopathy:      Cervical: No cervical adenopathy.   Skin:     General: Skin is warm and dry.      Findings: No rash.   Neurological:      Mental Status: She is alert and oriented to person, place, and time.      Gait: Gait normal.   Psychiatric:         " Speech: Speech normal.         Behavior: Behavior normal.           Assessment:       1. Need for influenza vaccination    2. Class 1 obesity with body mass index (BMI) of 34.0 to 34.9 in adult, unspecified obesity type, unspecified whether serious comorbidity present    3. Anxiety disorder, unspecified type    4. Gastroesophageal reflux disease, unspecified whether esophagitis present    5. Hyperlipidemia, unspecified hyperlipidemia type    6. Coagulation disorder    7. Hypertension, unspecified type         Plan:       Need for influenza vaccination  -     Influenza - Quadrivalent (Recombinant) (PF)    Class 1 obesity with body mass index (BMI) of 34.0 to 34.9 in adult, unspecified obesity type, unspecified whether serious comorbidity present  Comments:  TRY CONTRAVE. DIET AND EXERCISE  Orders:  -     naltrexone-bupropion (CONTRAVE) 8-90 mg TbSR; Take 1 tablet by mouth 2 (two) times daily.  Dispense: 60 tablet; Refill: 0    Anxiety disorder, unspecified type  -     DULoxetine (CYMBALTA) 30 MG capsule; Take 1 capsule (30 mg total) by mouth once daily.  Dispense: 90 capsule; Refill: 2    Gastroesophageal reflux disease, unspecified whether esophagitis present    Hyperlipidemia, unspecified hyperlipidemia type    Coagulation disorder    Hypertension, unspecified type      Follow up in about 4 weeks (around 10/29/2020) for medication management.        10/5/2020 Angi Barrett

## 2020-10-07 ENCOUNTER — PATIENT MESSAGE (OUTPATIENT)
Dept: FAMILY MEDICINE | Facility: CLINIC | Age: 60
End: 2020-10-07

## 2020-10-21 ENCOUNTER — PATIENT MESSAGE (OUTPATIENT)
Dept: FAMILY MEDICINE | Facility: CLINIC | Age: 60
End: 2020-10-21

## 2020-11-02 ENCOUNTER — PATIENT MESSAGE (OUTPATIENT)
Dept: FAMILY MEDICINE | Facility: CLINIC | Age: 60
End: 2020-11-02

## 2020-11-04 ENCOUNTER — PATIENT MESSAGE (OUTPATIENT)
Dept: FAMILY MEDICINE | Facility: CLINIC | Age: 60
End: 2020-11-04

## 2020-11-04 DIAGNOSIS — M54.50 LOW BACK PAIN, UNSPECIFIED BACK PAIN LATERALITY, UNSPECIFIED CHRONICITY, UNSPECIFIED WHETHER SCIATICA PRESENT: ICD-10-CM

## 2020-11-04 DIAGNOSIS — E78.2 MIXED HYPERLIPIDEMIA: ICD-10-CM

## 2020-11-04 RX ORDER — CYCLOBENZAPRINE HCL 10 MG
10 TABLET ORAL 3 TIMES DAILY PRN
Qty: 90 TABLET | Refills: 0 | Status: SHIPPED | OUTPATIENT
Start: 2020-11-04 | End: 2020-11-09 | Stop reason: SDUPTHER

## 2020-11-04 RX ORDER — TRAMADOL HYDROCHLORIDE 50 MG/1
50 TABLET ORAL EVERY 6 HOURS
Qty: 60 TABLET | Refills: 1 | Status: SHIPPED | OUTPATIENT
Start: 2020-11-04 | End: 2021-01-04 | Stop reason: SDUPTHER

## 2020-11-04 RX ORDER — PRAVASTATIN SODIUM 40 MG/1
40 TABLET ORAL DAILY
Qty: 30 TABLET | Refills: 11 | Status: SHIPPED | OUTPATIENT
Start: 2020-11-04 | End: 2020-11-09 | Stop reason: SDUPTHER

## 2020-11-09 DIAGNOSIS — M54.50 LOW BACK PAIN, UNSPECIFIED BACK PAIN LATERALITY, UNSPECIFIED CHRONICITY, UNSPECIFIED WHETHER SCIATICA PRESENT: ICD-10-CM

## 2020-11-09 DIAGNOSIS — E78.2 MIXED HYPERLIPIDEMIA: ICD-10-CM

## 2020-11-09 RX ORDER — CYCLOBENZAPRINE HCL 10 MG
10 TABLET ORAL 3 TIMES DAILY PRN
Qty: 90 TABLET | Refills: 0 | OUTPATIENT
Start: 2020-11-09 | End: 2020-12-28 | Stop reason: SDUPTHER

## 2020-11-09 RX ORDER — PRAVASTATIN SODIUM 40 MG/1
40 TABLET ORAL DAILY
Qty: 30 TABLET | Refills: 11 | OUTPATIENT
Start: 2020-11-09 | End: 2021-05-31 | Stop reason: SDUPTHER

## 2020-11-12 ENCOUNTER — PATIENT MESSAGE (OUTPATIENT)
Dept: FAMILY MEDICINE | Facility: CLINIC | Age: 60
End: 2020-11-12

## 2020-11-12 NOTE — TELEPHONE ENCOUNTER
Spoke to pharmacy regarding message blow. Pharmacist stated he did not receive the meds on 11/9. I was able to give the pharmacist a verbal on both meds

## 2020-11-30 DIAGNOSIS — I10 HYPERTENSION, UNSPECIFIED TYPE: ICD-10-CM

## 2020-11-30 RX ORDER — AMLODIPINE BESYLATE 5 MG/1
5 TABLET ORAL DAILY
Qty: 90 TABLET | Refills: 1 | Status: SHIPPED | OUTPATIENT
Start: 2020-11-30 | End: 2021-05-31 | Stop reason: SDUPTHER

## 2020-12-26 ENCOUNTER — PATIENT MESSAGE (OUTPATIENT)
Dept: FAMILY MEDICINE | Facility: CLINIC | Age: 60
End: 2020-12-26

## 2020-12-28 DIAGNOSIS — M54.50 LOW BACK PAIN, UNSPECIFIED BACK PAIN LATERALITY, UNSPECIFIED CHRONICITY, UNSPECIFIED WHETHER SCIATICA PRESENT: ICD-10-CM

## 2020-12-28 DIAGNOSIS — F41.9 ANXIETY DISORDER, UNSPECIFIED TYPE: ICD-10-CM

## 2020-12-28 RX ORDER — DULOXETIN HYDROCHLORIDE 30 MG/1
30 CAPSULE, DELAYED RELEASE ORAL DAILY
Qty: 90 CAPSULE | Refills: 2 | Status: SHIPPED | OUTPATIENT
Start: 2020-12-28 | End: 2021-09-27 | Stop reason: SDUPTHER

## 2020-12-28 RX ORDER — CYCLOBENZAPRINE HCL 10 MG
10 TABLET ORAL 3 TIMES DAILY PRN
Qty: 90 TABLET | Refills: 0 | Status: SHIPPED | OUTPATIENT
Start: 2020-12-28 | End: 2021-01-04 | Stop reason: SDUPTHER

## 2021-01-04 ENCOUNTER — PATIENT MESSAGE (OUTPATIENT)
Dept: FAMILY MEDICINE | Facility: CLINIC | Age: 61
End: 2021-01-04

## 2021-01-04 DIAGNOSIS — M54.50 LOW BACK PAIN, UNSPECIFIED BACK PAIN LATERALITY, UNSPECIFIED CHRONICITY, UNSPECIFIED WHETHER SCIATICA PRESENT: ICD-10-CM

## 2021-01-04 RX ORDER — CYCLOBENZAPRINE HCL 10 MG
10 TABLET ORAL 3 TIMES DAILY PRN
Qty: 90 TABLET | Refills: 0 | OUTPATIENT
Start: 2021-01-04 | End: 2021-01-05 | Stop reason: SDUPTHER

## 2021-01-04 RX ORDER — TRAMADOL HYDROCHLORIDE 50 MG/1
50 TABLET ORAL EVERY 6 HOURS
Qty: 60 TABLET | Refills: 1 | Status: SHIPPED | OUTPATIENT
Start: 2021-01-04 | End: 2021-02-23 | Stop reason: SDUPTHER

## 2021-01-05 DIAGNOSIS — M54.50 LOW BACK PAIN, UNSPECIFIED BACK PAIN LATERALITY, UNSPECIFIED CHRONICITY, UNSPECIFIED WHETHER SCIATICA PRESENT: ICD-10-CM

## 2021-01-05 RX ORDER — CYCLOBENZAPRINE HCL 10 MG
10 TABLET ORAL 3 TIMES DAILY PRN
Qty: 90 TABLET | Refills: 0 | Status: SHIPPED | OUTPATIENT
Start: 2021-01-05 | End: 2021-02-21 | Stop reason: SDUPTHER

## 2021-02-14 ENCOUNTER — PATIENT MESSAGE (OUTPATIENT)
Dept: FAMILY MEDICINE | Facility: CLINIC | Age: 61
End: 2021-02-14

## 2021-02-15 DIAGNOSIS — D68.9 COAGULATION DISORDER: ICD-10-CM

## 2021-02-15 RX ORDER — APIXABAN 5 MG/1
5 TABLET, FILM COATED ORAL 2 TIMES DAILY
Qty: 180 TABLET | Refills: 1 | Status: SHIPPED | OUTPATIENT
Start: 2021-02-15 | End: 2021-02-15 | Stop reason: SDUPTHER

## 2021-02-15 RX ORDER — APIXABAN 5 MG/1
5 TABLET, FILM COATED ORAL 2 TIMES DAILY
Qty: 180 TABLET | Refills: 1 | Status: SHIPPED | OUTPATIENT
Start: 2021-02-15 | End: 2022-03-02 | Stop reason: SDUPTHER

## 2021-02-22 ENCOUNTER — PATIENT MESSAGE (OUTPATIENT)
Dept: FAMILY MEDICINE | Facility: CLINIC | Age: 61
End: 2021-02-22

## 2021-02-22 DIAGNOSIS — M54.50 LOW BACK PAIN, UNSPECIFIED BACK PAIN LATERALITY, UNSPECIFIED CHRONICITY, UNSPECIFIED WHETHER SCIATICA PRESENT: ICD-10-CM

## 2021-02-23 RX ORDER — CYCLOBENZAPRINE HCL 10 MG
10 TABLET ORAL 3 TIMES DAILY PRN
Qty: 90 TABLET | Refills: 0 | Status: SHIPPED | OUTPATIENT
Start: 2021-02-23 | End: 2021-05-31 | Stop reason: SDUPTHER

## 2021-02-23 RX ORDER — TRAMADOL HYDROCHLORIDE 50 MG/1
50 TABLET ORAL EVERY 6 HOURS
Qty: 60 TABLET | Refills: 1 | Status: SHIPPED | OUTPATIENT
Start: 2021-02-23 | End: 2021-09-27

## 2021-03-05 ENCOUNTER — PATIENT MESSAGE (OUTPATIENT)
Dept: FAMILY MEDICINE | Facility: CLINIC | Age: 61
End: 2021-03-05

## 2021-03-16 ENCOUNTER — TELEPHONE (OUTPATIENT)
Dept: FAMILY MEDICINE | Facility: CLINIC | Age: 61
End: 2021-03-16

## 2021-03-19 ENCOUNTER — PATIENT MESSAGE (OUTPATIENT)
Dept: FAMILY MEDICINE | Facility: CLINIC | Age: 61
End: 2021-03-19

## 2021-03-27 ENCOUNTER — PATIENT MESSAGE (OUTPATIENT)
Dept: FAMILY MEDICINE | Facility: CLINIC | Age: 61
End: 2021-03-27

## 2021-03-29 ENCOUNTER — PATIENT MESSAGE (OUTPATIENT)
Dept: FAMILY MEDICINE | Facility: CLINIC | Age: 61
End: 2021-03-29

## 2021-03-30 ENCOUNTER — PATIENT MESSAGE (OUTPATIENT)
Dept: FAMILY MEDICINE | Facility: CLINIC | Age: 61
End: 2021-03-30

## 2021-04-25 ENCOUNTER — PATIENT MESSAGE (OUTPATIENT)
Dept: FAMILY MEDICINE | Facility: CLINIC | Age: 61
End: 2021-04-25

## 2021-04-25 DIAGNOSIS — M54.50 LOW BACK PAIN, UNSPECIFIED BACK PAIN LATERALITY, UNSPECIFIED CHRONICITY, UNSPECIFIED WHETHER SCIATICA PRESENT: ICD-10-CM

## 2021-04-25 RX ORDER — TRAMADOL HYDROCHLORIDE 50 MG/1
50 TABLET ORAL EVERY 6 HOURS
Qty: 60 TABLET | Refills: 1 | Status: CANCELLED | OUTPATIENT
Start: 2021-04-25

## 2021-04-25 RX ORDER — CYCLOBENZAPRINE HCL 10 MG
10 TABLET ORAL 3 TIMES DAILY PRN
Qty: 90 TABLET | Refills: 0 | Status: CANCELLED | OUTPATIENT
Start: 2021-04-25

## 2021-05-02 ENCOUNTER — PATIENT MESSAGE (OUTPATIENT)
Dept: FAMILY MEDICINE | Facility: CLINIC | Age: 61
End: 2021-05-02

## 2021-05-03 ENCOUNTER — PATIENT MESSAGE (OUTPATIENT)
Dept: FAMILY MEDICINE | Facility: CLINIC | Age: 61
End: 2021-05-03

## 2021-05-09 ENCOUNTER — PATIENT MESSAGE (OUTPATIENT)
Dept: FAMILY MEDICINE | Facility: CLINIC | Age: 61
End: 2021-05-09

## 2021-05-30 ENCOUNTER — PATIENT MESSAGE (OUTPATIENT)
Dept: FAMILY MEDICINE | Facility: CLINIC | Age: 61
End: 2021-05-30

## 2021-05-30 DIAGNOSIS — I10 HYPERTENSION, UNSPECIFIED TYPE: ICD-10-CM

## 2021-05-30 DIAGNOSIS — M54.50 LOW BACK PAIN, UNSPECIFIED BACK PAIN LATERALITY, UNSPECIFIED CHRONICITY, UNSPECIFIED WHETHER SCIATICA PRESENT: ICD-10-CM

## 2021-05-30 DIAGNOSIS — E78.2 MIXED HYPERLIPIDEMIA: ICD-10-CM

## 2021-05-30 RX ORDER — AMLODIPINE BESYLATE 5 MG/1
5 TABLET ORAL DAILY
Qty: 90 TABLET | Refills: 1 | Status: CANCELLED | OUTPATIENT
Start: 2021-05-30

## 2021-05-30 RX ORDER — TRAMADOL HYDROCHLORIDE 50 MG/1
50 TABLET ORAL EVERY 6 HOURS
Qty: 60 TABLET | Refills: 1 | Status: CANCELLED | OUTPATIENT
Start: 2021-05-30

## 2021-05-30 RX ORDER — CYCLOBENZAPRINE HCL 10 MG
10 TABLET ORAL 3 TIMES DAILY PRN
Qty: 90 TABLET | Refills: 0 | Status: CANCELLED | OUTPATIENT
Start: 2021-05-30

## 2021-05-31 ENCOUNTER — PATIENT MESSAGE (OUTPATIENT)
Dept: FAMILY MEDICINE | Facility: CLINIC | Age: 61
End: 2021-05-31

## 2021-05-31 RX ORDER — CYCLOBENZAPRINE HCL 10 MG
10 TABLET ORAL 3 TIMES DAILY PRN
Qty: 90 TABLET | Refills: 0 | Status: SHIPPED | OUTPATIENT
Start: 2021-05-31 | End: 2021-09-27

## 2021-05-31 RX ORDER — PRAVASTATIN SODIUM 40 MG/1
40 TABLET ORAL DAILY
Qty: 30 TABLET | Refills: 2 | Status: SHIPPED | OUTPATIENT
Start: 2021-05-31 | End: 2021-05-31

## 2021-05-31 RX ORDER — PANTOPRAZOLE SODIUM 40 MG/1
40 TABLET, DELAYED RELEASE ORAL DAILY
Qty: 30 TABLET | Refills: 3 | Status: SHIPPED | OUTPATIENT
Start: 2021-05-31 | End: 2021-09-27 | Stop reason: SDUPTHER

## 2021-05-31 RX ORDER — PRAVASTATIN SODIUM 40 MG/1
40 TABLET ORAL DAILY
Qty: 30 TABLET | Refills: 2 | Status: SHIPPED | OUTPATIENT
Start: 2021-05-31 | End: 2021-09-27 | Stop reason: SDUPTHER

## 2021-05-31 RX ORDER — AMLODIPINE BESYLATE 5 MG/1
5 TABLET ORAL DAILY
Qty: 30 TABLET | Refills: 3 | Status: SHIPPED | OUTPATIENT
Start: 2021-05-31 | End: 2021-09-27 | Stop reason: SDUPTHER

## 2021-06-10 ENCOUNTER — PATIENT MESSAGE (OUTPATIENT)
Dept: FAMILY MEDICINE | Facility: CLINIC | Age: 61
End: 2021-06-10

## 2021-07-11 ENCOUNTER — PATIENT MESSAGE (OUTPATIENT)
Dept: FAMILY MEDICINE | Facility: CLINIC | Age: 61
End: 2021-07-11

## 2021-07-11 DIAGNOSIS — M54.50 LOW BACK PAIN, UNSPECIFIED BACK PAIN LATERALITY, UNSPECIFIED CHRONICITY, UNSPECIFIED WHETHER SCIATICA PRESENT: ICD-10-CM

## 2021-07-11 RX ORDER — CYCLOBENZAPRINE HCL 10 MG
10 TABLET ORAL 3 TIMES DAILY PRN
Qty: 90 TABLET | Refills: 0 | Status: CANCELLED | OUTPATIENT
Start: 2021-07-11

## 2021-07-11 RX ORDER — TRAMADOL HYDROCHLORIDE 50 MG/1
50 TABLET ORAL EVERY 6 HOURS
Qty: 60 TABLET | Refills: 1 | Status: CANCELLED | OUTPATIENT
Start: 2021-07-11

## 2021-07-18 ENCOUNTER — PATIENT MESSAGE (OUTPATIENT)
Dept: FAMILY MEDICINE | Facility: CLINIC | Age: 61
End: 2021-07-18

## 2021-07-21 ENCOUNTER — PATIENT MESSAGE (OUTPATIENT)
Dept: FAMILY MEDICINE | Facility: CLINIC | Age: 61
End: 2021-07-21

## 2021-07-22 ENCOUNTER — PATIENT MESSAGE (OUTPATIENT)
Dept: FAMILY MEDICINE | Facility: CLINIC | Age: 61
End: 2021-07-22

## 2021-07-27 ENCOUNTER — PATIENT MESSAGE (OUTPATIENT)
Dept: FAMILY MEDICINE | Facility: CLINIC | Age: 61
End: 2021-07-27

## 2021-07-27 ENCOUNTER — TELEPHONE (OUTPATIENT)
Dept: FAMILY MEDICINE | Facility: CLINIC | Age: 61
End: 2021-07-27

## 2021-08-06 ENCOUNTER — PATIENT MESSAGE (OUTPATIENT)
Dept: FAMILY MEDICINE | Facility: CLINIC | Age: 61
End: 2021-08-06

## 2021-08-10 ENCOUNTER — PATIENT MESSAGE (OUTPATIENT)
Dept: FAMILY MEDICINE | Facility: CLINIC | Age: 61
End: 2021-08-10

## 2021-08-24 NOTE — PLAN OF CARE
1510    Pt voided in the post op area on the commode with assistance to the bathroom  
Arrived ambulatory in no present distress, plan of care reviewed, warm blankets provided  
Pt awake and pain level 7/10  She states the pain intolerable and she wants to go home  
n/a

## 2021-09-08 ENCOUNTER — PATIENT MESSAGE (OUTPATIENT)
Dept: FAMILY MEDICINE | Facility: CLINIC | Age: 61
End: 2021-09-08

## 2021-09-20 ENCOUNTER — TELEPHONE (OUTPATIENT)
Dept: FAMILY MEDICINE | Facility: CLINIC | Age: 61
End: 2021-09-20

## 2021-09-27 ENCOUNTER — HOSPITAL ENCOUNTER (OUTPATIENT)
Dept: RADIOLOGY | Facility: HOSPITAL | Age: 61
Discharge: HOME | End: 2021-09-27
Attending: NURSE PRACTITIONER
Payer: COMMERCIAL

## 2021-09-27 ENCOUNTER — OFFICE VISIT (OUTPATIENT)
Dept: FAMILY MEDICINE | Facility: CLINIC | Age: 61
End: 2021-09-27
Payer: COMMERCIAL

## 2021-09-27 ENCOUNTER — TELEPHONE (OUTPATIENT)
Dept: FAMILY MEDICINE | Facility: CLINIC | Age: 61
End: 2021-09-27

## 2021-09-27 ENCOUNTER — PATIENT MESSAGE (OUTPATIENT)
Dept: FAMILY MEDICINE | Facility: CLINIC | Age: 61
End: 2021-09-27

## 2021-09-27 VITALS
HEIGHT: 64 IN | DIASTOLIC BLOOD PRESSURE: 84 MMHG | SYSTOLIC BLOOD PRESSURE: 132 MMHG | BODY MASS INDEX: 34.49 KG/M2 | WEIGHT: 202 LBS | HEART RATE: 84 BPM

## 2021-09-27 DIAGNOSIS — Z12.11 SPECIAL SCREENING FOR MALIGNANT NEOPLASMS, COLON: Primary | ICD-10-CM

## 2021-09-27 DIAGNOSIS — Z79.899 HIGH RISK MEDICATION USE: ICD-10-CM

## 2021-09-27 DIAGNOSIS — M54.9 DORSALGIA, UNSPECIFIED: ICD-10-CM

## 2021-09-27 DIAGNOSIS — M54.30 SCIATICA, UNSPECIFIED LATERALITY: ICD-10-CM

## 2021-09-27 DIAGNOSIS — D68.9 COAGULATION DISORDER: ICD-10-CM

## 2021-09-27 DIAGNOSIS — K59.00 CONSTIPATION, UNSPECIFIED CONSTIPATION TYPE: ICD-10-CM

## 2021-09-27 DIAGNOSIS — Z12.31 OTHER SCREENING MAMMOGRAM: ICD-10-CM

## 2021-09-27 DIAGNOSIS — M54.50 LOW BACK PAIN, UNSPECIFIED BACK PAIN LATERALITY, UNSPECIFIED CHRONICITY, UNSPECIFIED WHETHER SCIATICA PRESENT: ICD-10-CM

## 2021-09-27 DIAGNOSIS — F41.9 ANXIETY DISORDER, UNSPECIFIED TYPE: ICD-10-CM

## 2021-09-27 DIAGNOSIS — I10 HYPERTENSION, UNSPECIFIED TYPE: ICD-10-CM

## 2021-09-27 DIAGNOSIS — E78.2 MIXED HYPERLIPIDEMIA: ICD-10-CM

## 2021-09-27 PROCEDURE — 1160F RVW MEDS BY RX/DR IN RCRD: CPT | Mod: S$GLB,,, | Performed by: NURSE PRACTITIONER

## 2021-09-27 PROCEDURE — 72100 X-RAY EXAM L-S SPINE 2/3 VWS: CPT | Mod: TC,PO

## 2021-09-27 PROCEDURE — 3008F BODY MASS INDEX DOCD: CPT | Mod: S$GLB,,, | Performed by: NURSE PRACTITIONER

## 2021-09-27 PROCEDURE — 3075F SYST BP GE 130 - 139MM HG: CPT | Mod: S$GLB,,, | Performed by: NURSE PRACTITIONER

## 2021-09-27 PROCEDURE — 3008F PR BODY MASS INDEX (BMI) DOCUMENTED: ICD-10-PCS | Mod: S$GLB,,, | Performed by: NURSE PRACTITIONER

## 2021-09-27 PROCEDURE — 99214 OFFICE O/P EST MOD 30 MIN: CPT | Mod: S$GLB,,, | Performed by: NURSE PRACTITIONER

## 2021-09-27 PROCEDURE — 3075F PR MOST RECENT SYSTOLIC BLOOD PRESS GE 130-139MM HG: ICD-10-PCS | Mod: S$GLB,,, | Performed by: NURSE PRACTITIONER

## 2021-09-27 PROCEDURE — 1160F PR REVIEW ALL MEDS BY PRESCRIBER/CLIN PHARMACIST DOCUMENTED: ICD-10-PCS | Mod: S$GLB,,, | Performed by: NURSE PRACTITIONER

## 2021-09-27 PROCEDURE — 3079F PR MOST RECENT DIASTOLIC BLOOD PRESSURE 80-89 MM HG: ICD-10-PCS | Mod: S$GLB,,, | Performed by: NURSE PRACTITIONER

## 2021-09-27 PROCEDURE — 3079F DIAST BP 80-89 MM HG: CPT | Mod: S$GLB,,, | Performed by: NURSE PRACTITIONER

## 2021-09-27 PROCEDURE — 99214 PR OFFICE/OUTPT VISIT, EST, LEVL IV, 30-39 MIN: ICD-10-PCS | Mod: S$GLB,,, | Performed by: NURSE PRACTITIONER

## 2021-09-27 RX ORDER — AMLODIPINE BESYLATE 5 MG/1
5 TABLET ORAL DAILY
Qty: 30 TABLET | Refills: 5 | Status: SHIPPED | OUTPATIENT
Start: 2021-09-27 | End: 2022-01-06 | Stop reason: SDUPTHER

## 2021-09-27 RX ORDER — PRAVASTATIN SODIUM 40 MG/1
40 TABLET ORAL DAILY
Qty: 30 TABLET | Refills: 5 | Status: SHIPPED | OUTPATIENT
Start: 2021-09-27 | End: 2022-03-09 | Stop reason: SDUPTHER

## 2021-09-27 RX ORDER — PHENTERMINE HYDROCHLORIDE 37.5 MG/1
37.5 TABLET ORAL
Qty: 30 TABLET | Refills: 0 | Status: SHIPPED | OUTPATIENT
Start: 2021-09-27 | End: 2021-10-26 | Stop reason: SDUPTHER

## 2021-09-27 RX ORDER — CYCLOBENZAPRINE HCL 10 MG
10 TABLET ORAL 3 TIMES DAILY PRN
Qty: 90 TABLET | Refills: 2 | Status: SHIPPED | OUTPATIENT
Start: 2021-09-27 | End: 2021-10-25 | Stop reason: SDUPTHER

## 2021-09-27 RX ORDER — DULOXETIN HYDROCHLORIDE 30 MG/1
30 CAPSULE, DELAYED RELEASE ORAL DAILY
Qty: 30 CAPSULE | Refills: 5 | Status: SHIPPED | OUTPATIENT
Start: 2021-09-27 | End: 2022-03-09 | Stop reason: SDUPTHER

## 2021-09-27 RX ORDER — PANTOPRAZOLE SODIUM 40 MG/1
40 TABLET, DELAYED RELEASE ORAL DAILY
Qty: 30 TABLET | Refills: 11 | Status: SHIPPED | OUTPATIENT
Start: 2021-09-27 | End: 2022-03-09 | Stop reason: SDUPTHER

## 2021-09-27 RX ORDER — HYDROCODONE BITARTRATE AND ACETAMINOPHEN 5; 325 MG/1; MG/1
1 TABLET ORAL EVERY 12 HOURS PRN
Qty: 60 TABLET | Refills: 0 | Status: SHIPPED | OUTPATIENT
Start: 2021-09-27 | End: 2021-10-26 | Stop reason: SDUPTHER

## 2021-09-27 RX ORDER — DOCUSATE SODIUM 100 MG/1
100 CAPSULE, LIQUID FILLED ORAL 2 TIMES DAILY
Qty: 60 CAPSULE | Refills: 3 | Status: SHIPPED | OUTPATIENT
Start: 2021-09-27 | End: 2022-03-09 | Stop reason: SDUPTHER

## 2021-09-27 RX ORDER — METHYLPREDNISOLONE 4 MG/1
TABLET ORAL
Qty: 1 PACKAGE | Refills: 0 | Status: SHIPPED | OUTPATIENT
Start: 2021-09-27 | End: 2021-10-18

## 2021-10-06 ENCOUNTER — TELEPHONE (OUTPATIENT)
Dept: FAMILY MEDICINE | Facility: CLINIC | Age: 61
End: 2021-10-06

## 2021-10-25 ENCOUNTER — OFFICE VISIT (OUTPATIENT)
Dept: FAMILY MEDICINE | Facility: CLINIC | Age: 61
End: 2021-10-25
Payer: COMMERCIAL

## 2021-10-25 VITALS
DIASTOLIC BLOOD PRESSURE: 82 MMHG | WEIGHT: 199 LBS | HEART RATE: 76 BPM | HEIGHT: 64 IN | SYSTOLIC BLOOD PRESSURE: 116 MMHG | BODY MASS INDEX: 33.97 KG/M2

## 2021-10-25 DIAGNOSIS — M54.50 LOW BACK PAIN, UNSPECIFIED BACK PAIN LATERALITY, UNSPECIFIED CHRONICITY, UNSPECIFIED WHETHER SCIATICA PRESENT: ICD-10-CM

## 2021-10-25 DIAGNOSIS — E78.2 MIXED HYPERLIPIDEMIA: ICD-10-CM

## 2021-10-25 DIAGNOSIS — Z79.899 HIGH RISK MEDICATION USE: ICD-10-CM

## 2021-10-25 DIAGNOSIS — F41.9 ANXIETY DISORDER, UNSPECIFIED TYPE: ICD-10-CM

## 2021-10-25 DIAGNOSIS — I10 HYPERTENSION, UNSPECIFIED TYPE: Primary | ICD-10-CM

## 2021-10-25 DIAGNOSIS — M54.9 DORSALGIA, UNSPECIFIED: ICD-10-CM

## 2021-10-25 PROCEDURE — 3008F BODY MASS INDEX DOCD: CPT | Mod: S$GLB,,, | Performed by: NURSE PRACTITIONER

## 2021-10-25 PROCEDURE — 3079F DIAST BP 80-89 MM HG: CPT | Mod: S$GLB,,, | Performed by: NURSE PRACTITIONER

## 2021-10-25 PROCEDURE — 3074F PR MOST RECENT SYSTOLIC BLOOD PRESSURE < 130 MM HG: ICD-10-PCS | Mod: S$GLB,,, | Performed by: NURSE PRACTITIONER

## 2021-10-25 PROCEDURE — 3079F PR MOST RECENT DIASTOLIC BLOOD PRESSURE 80-89 MM HG: ICD-10-PCS | Mod: S$GLB,,, | Performed by: NURSE PRACTITIONER

## 2021-10-25 PROCEDURE — 3074F SYST BP LT 130 MM HG: CPT | Mod: S$GLB,,, | Performed by: NURSE PRACTITIONER

## 2021-10-25 PROCEDURE — 99214 PR OFFICE/OUTPT VISIT, EST, LEVL IV, 30-39 MIN: ICD-10-PCS | Mod: S$GLB,,, | Performed by: NURSE PRACTITIONER

## 2021-10-25 PROCEDURE — 3008F PR BODY MASS INDEX (BMI) DOCUMENTED: ICD-10-PCS | Mod: S$GLB,,, | Performed by: NURSE PRACTITIONER

## 2021-10-25 PROCEDURE — 99214 OFFICE O/P EST MOD 30 MIN: CPT | Mod: S$GLB,,, | Performed by: NURSE PRACTITIONER

## 2021-10-25 RX ORDER — CYCLOBENZAPRINE HCL 10 MG
10 TABLET ORAL 3 TIMES DAILY PRN
Qty: 90 TABLET | Refills: 2 | Status: SHIPPED | OUTPATIENT
Start: 2021-10-25 | End: 2021-12-01 | Stop reason: SDUPTHER

## 2021-10-26 ENCOUNTER — PATIENT MESSAGE (OUTPATIENT)
Dept: FAMILY MEDICINE | Facility: CLINIC | Age: 61
End: 2021-10-26
Payer: COMMERCIAL

## 2021-10-26 RX ORDER — HYDROCODONE BITARTRATE AND ACETAMINOPHEN 5; 325 MG/1; MG/1
1 TABLET ORAL EVERY 12 HOURS PRN
Qty: 60 TABLET | Refills: 0 | Status: SHIPPED | OUTPATIENT
Start: 2021-10-26 | End: 2021-12-01 | Stop reason: SDUPTHER

## 2021-10-26 RX ORDER — PHENTERMINE HYDROCHLORIDE 37.5 MG/1
37.5 TABLET ORAL
Qty: 30 TABLET | Refills: 0 | Status: SHIPPED | OUTPATIENT
Start: 2021-10-26 | End: 2021-11-25

## 2021-11-10 ENCOUNTER — TELEPHONE (OUTPATIENT)
Dept: FAMILY MEDICINE | Facility: CLINIC | Age: 61
End: 2021-11-10
Payer: COMMERCIAL

## 2021-11-30 ENCOUNTER — TELEPHONE (OUTPATIENT)
Dept: FAMILY MEDICINE | Facility: CLINIC | Age: 61
End: 2021-11-30

## 2021-11-30 ENCOUNTER — PATIENT MESSAGE (OUTPATIENT)
Dept: FAMILY MEDICINE | Facility: CLINIC | Age: 61
End: 2021-11-30

## 2021-11-30 DIAGNOSIS — M54.50 LOW BACK PAIN, UNSPECIFIED BACK PAIN LATERALITY, UNSPECIFIED CHRONICITY, UNSPECIFIED WHETHER SCIATICA PRESENT: ICD-10-CM

## 2021-12-01 RX ORDER — HYDROCODONE BITARTRATE AND ACETAMINOPHEN 5; 325 MG/1; MG/1
1 TABLET ORAL EVERY 12 HOURS PRN
Qty: 60 TABLET | Refills: 0 | Status: SHIPPED | OUTPATIENT
Start: 2021-12-01 | End: 2022-01-07 | Stop reason: SDUPTHER

## 2021-12-01 RX ORDER — CYCLOBENZAPRINE HCL 10 MG
10 TABLET ORAL 3 TIMES DAILY PRN
Qty: 90 TABLET | Refills: 0 | Status: SHIPPED | OUTPATIENT
Start: 2021-12-01 | End: 2022-01-06 | Stop reason: SDUPTHER

## 2021-12-30 ENCOUNTER — PATIENT MESSAGE (OUTPATIENT)
Dept: FAMILY MEDICINE | Facility: CLINIC | Age: 61
End: 2021-12-30
Payer: COMMERCIAL

## 2022-01-06 ENCOUNTER — OFFICE VISIT (OUTPATIENT)
Dept: FAMILY MEDICINE | Facility: CLINIC | Age: 62
End: 2022-01-06
Payer: COMMERCIAL

## 2022-01-06 VITALS
DIASTOLIC BLOOD PRESSURE: 78 MMHG | WEIGHT: 201.81 LBS | HEIGHT: 64 IN | BODY MASS INDEX: 34.45 KG/M2 | SYSTOLIC BLOOD PRESSURE: 126 MMHG | HEART RATE: 76 BPM

## 2022-01-06 DIAGNOSIS — Z86.73 HISTORY OF TIA (TRANSIENT ISCHEMIC ATTACK): ICD-10-CM

## 2022-01-06 DIAGNOSIS — D68.9 COAGULATION DISORDER: Primary | ICD-10-CM

## 2022-01-06 DIAGNOSIS — Z79.899 HIGH RISK MEDICATION USE: ICD-10-CM

## 2022-01-06 DIAGNOSIS — K21.9 GASTROESOPHAGEAL REFLUX DISEASE, UNSPECIFIED WHETHER ESOPHAGITIS PRESENT: ICD-10-CM

## 2022-01-06 DIAGNOSIS — M54.9 DORSALGIA, UNSPECIFIED: ICD-10-CM

## 2022-01-06 DIAGNOSIS — I10 HYPERTENSION, UNSPECIFIED TYPE: ICD-10-CM

## 2022-01-06 DIAGNOSIS — E78.5 HYPERLIPIDEMIA, UNSPECIFIED HYPERLIPIDEMIA TYPE: ICD-10-CM

## 2022-01-06 DIAGNOSIS — F41.9 ANXIETY DISORDER, UNSPECIFIED TYPE: ICD-10-CM

## 2022-01-06 DIAGNOSIS — M54.50 LOW BACK PAIN, UNSPECIFIED BACK PAIN LATERALITY, UNSPECIFIED CHRONICITY, UNSPECIFIED WHETHER SCIATICA PRESENT: ICD-10-CM

## 2022-01-06 PROCEDURE — 3078F PR MOST RECENT DIASTOLIC BLOOD PRESSURE < 80 MM HG: ICD-10-PCS | Mod: S$GLB,,, | Performed by: NURSE PRACTITIONER

## 2022-01-06 PROCEDURE — 3078F DIAST BP <80 MM HG: CPT | Mod: S$GLB,,, | Performed by: NURSE PRACTITIONER

## 2022-01-06 PROCEDURE — 99214 OFFICE O/P EST MOD 30 MIN: CPT | Mod: S$GLB,,, | Performed by: NURSE PRACTITIONER

## 2022-01-06 PROCEDURE — 99214 PR OFFICE/OUTPT VISIT, EST, LEVL IV, 30-39 MIN: ICD-10-PCS | Mod: S$GLB,,, | Performed by: NURSE PRACTITIONER

## 2022-01-06 PROCEDURE — 3074F SYST BP LT 130 MM HG: CPT | Mod: S$GLB,,, | Performed by: NURSE PRACTITIONER

## 2022-01-06 PROCEDURE — 3008F BODY MASS INDEX DOCD: CPT | Mod: S$GLB,,, | Performed by: NURSE PRACTITIONER

## 2022-01-06 PROCEDURE — 3074F PR MOST RECENT SYSTOLIC BLOOD PRESSURE < 130 MM HG: ICD-10-PCS | Mod: S$GLB,,, | Performed by: NURSE PRACTITIONER

## 2022-01-06 PROCEDURE — 3008F PR BODY MASS INDEX (BMI) DOCUMENTED: ICD-10-PCS | Mod: S$GLB,,, | Performed by: NURSE PRACTITIONER

## 2022-01-06 RX ORDER — HYDROCODONE BITARTRATE AND ACETAMINOPHEN 5; 325 MG/1; MG/1
1 TABLET ORAL EVERY 12 HOURS PRN
Qty: 60 TABLET | Refills: 0 | Status: CANCELLED | OUTPATIENT
Start: 2022-01-06

## 2022-01-06 RX ORDER — AMLODIPINE BESYLATE 5 MG/1
5 TABLET ORAL DAILY
Qty: 30 TABLET | Refills: 5 | Status: SHIPPED | OUTPATIENT
Start: 2022-01-06 | End: 2022-03-09 | Stop reason: SDUPTHER

## 2022-01-06 RX ORDER — CYCLOBENZAPRINE HCL 10 MG
10 TABLET ORAL 3 TIMES DAILY PRN
Qty: 90 TABLET | Refills: 0 | Status: SHIPPED | OUTPATIENT
Start: 2022-01-06 | End: 2022-02-06 | Stop reason: SDUPTHER

## 2022-01-06 NOTE — PROGRESS NOTES
SUBJECTIVE:    Patient ID: eJssica Mitchell is a 61 y.o. female.    Chief Complaint: Follow-up (Brought bottles, mammogram/c-scope to be scheduled//dp)    61 YEAR OLD FEMALE PRESENTS FOR CHECK UP  TREATED FOR ANXIETY, HTN, HYPERLIPIDEMIA, GERD ON ELIQUIS DAILY. TAKING MEDS AS PRESCRIBED. STILL WORKING BY [employer]. HAS INTERMITTENT BACK PAIN AFTER BEING ON FEET ALL DAY. TAKES NORCO WITH IMPROVEMENT OF SYMPTOMS. BOWEL MOVEMENTS HAVE BECOME MORE REGULAR 3-4 X PER WEEK. TAKES LINZESS. DUE FOR LABS. SLEEPING BETTER.       No visits with results within 6 Month(s) from this visit.   Latest known visit with results is:   Office Visit on 03/16/2020   Component Date Value Ref Range Status    WBC 03/16/2020 14.4* 3.8 - 10.8 Thousand/uL Final    RBC 03/16/2020 5.10  3.80 - 5.10 Million/uL Final    Hemoglobin 03/16/2020 14.6  11.7 - 15.5 g/dL Final    Hematocrit 03/16/2020 44.0  35.0 - 45.0 % Final    MCV 03/16/2020 86.3  80.0 - 100.0 fL Final    MCH 03/16/2020 28.6  27.0 - 33.0 pg Final    MCHC 03/16/2020 33.2  32.0 - 36.0 g/dL Final    RDW 03/16/2020 12.4  11.0 - 15.0 % Final    Platelets 03/16/2020 254  140 - 400 Thousand/uL Final    MPV 03/16/2020 10.1  7.5 - 12.5 fL Final    Neutrophils, Abs 03/16/2020 11,333* 1,500 - 7,800 cells/uL Final    Lymph # 03/16/2020 2,203  850 - 3,900 cells/uL Final    Mono # 03/16/2020 763  200 - 950 cells/uL Final    Eos # 03/16/2020 58  15 - 500 cells/uL Final    Baso # 03/16/2020 43  0 - 200 cells/uL Final    Neutrophils Relative 03/16/2020 78.7  % Final    Lymph % 03/16/2020 15.3  % Final    Mono % 03/16/2020 5.3  % Final    Eosinophil % 03/16/2020 0.4  % Final    Basophil % 03/16/2020 0.3  % Final    Glucose 03/16/2020 108  65 - 139 mg/dL Final    BUN 03/16/2020 13  7 - 25 mg/dL Final    Creatinine 03/16/2020 0.86  0.50 - 1.05 mg/dL Final    eGFR if non African American 03/16/2020 74  > OR = 60 mL/min/1.73m2 Final    eGFR if African American 03/16/2020 86   > OR = 60 mL/min/1.73m2 Final    BUN/Creatinine Ratio 03/16/2020 NOT APPLICABLE  6 - 22 (calc) Final    Sodium 03/16/2020 139  135 - 146 mmol/L Final    Potassium 03/16/2020 3.7  3.5 - 5.3 mmol/L Final    Chloride 03/16/2020 104  98 - 110 mmol/L Final    CO2 03/16/2020 26  20 - 32 mmol/L Final    Calcium 03/16/2020 9.2  8.6 - 10.4 mg/dL Final    Total Protein 03/16/2020 7.1  6.1 - 8.1 g/dL Final    Albumin 03/16/2020 3.8  3.6 - 5.1 g/dL Final    Globulin, Total 03/16/2020 3.3  1.9 - 3.7 g/dL (calc) Final    Albumin/Globulin Ratio 03/16/2020 1.2  1.0 - 2.5 (calc) Final    Total Bilirubin 03/16/2020 0.4  0.2 - 1.2 mg/dL Final    Alkaline Phosphatase 03/16/2020 86  37 - 153 U/L Final    AST 03/16/2020 23  10 - 35 U/L Final    ALT 03/16/2020 28  6 - 29 U/L Final    Amylase 03/16/2020 31  21 - 101 U/L Final    Lipase 03/16/2020 23  7 - 60 U/L Final       Past Medical History:   Diagnosis Date    Adrenal tumor     DVT (deep venous thrombosis) 2012    Hiatal hernia     HTN (hypertension)     Multiple thyroid nodules     two    Stomach ulcer     Stroke     at 31 years old     Social History     Socioeconomic History    Marital status:    Tobacco Use    Smoking status: Former Smoker    Smokeless tobacco: Never Used   Substance and Sexual Activity    Alcohol use: Yes     Alcohol/week: 0.0 standard drinks     Comment: socially    Drug use: No     Past Surgical History:   Procedure Laterality Date    Bilatweral Tubal ligation      ESOPHAGEAL DILATION       History reviewed. No pertinent family history.    Review of patient's allergies indicates:   Allergen Reactions    Codeine Itching    Codeine sulfate      Other reaction(s): Unknown    Lisinopril Other (See Comments)     cough  Other reaction(s): Unknown       Current Outpatient Medications:     aspirin (ECOTRIN) 81 MG EC tablet, Take 81 mg by mouth 2 (two) times daily., Disp: , Rfl:     docusate sodium (COLACE) 100 MG capsule, Take 1  "capsule (100 mg total) by mouth 2 (two) times daily., Disp: 60 capsule, Rfl: 3    DULoxetine (CYMBALTA) 30 MG capsule, Take 1 capsule (30 mg total) by mouth once daily., Disp: 30 capsule, Rfl: 5    ELIQUIS 5 mg Tab, Take 1 tablet (5 mg total) by mouth 2 (two) times daily., Disp: 180 tablet, Rfl: 1    HYDROcodone-acetaminophen (NORCO) 5-325 mg per tablet, Take 1 tablet by mouth every 12 (twelve) hours as needed for Pain., Disp: 60 tablet, Rfl: 0    pantoprazole (PROTONIX) 40 MG tablet, Take 1 tablet (40 mg total) by mouth once daily., Disp: 30 tablet, Rfl: 11    pravastatin (PRAVACHOL) 40 MG tablet, Take 1 tablet (40 mg total) by mouth once daily., Disp: 30 tablet, Rfl: 5    amLODIPine (NORVASC) 5 MG tablet, Take 1 tablet (5 mg total) by mouth once daily., Disp: 30 tablet, Rfl: 5    cyclobenzaprine (FLEXERIL) 10 MG tablet, Take 1 tablet (10 mg total) by mouth 3 (three) times daily as needed for Muscle spasms., Disp: 90 tablet, Rfl: 0    linaCLOtide (LINZESS) 145 mcg Cap capsule, Take 145 mcg by mouth once daily., Disp: , Rfl:     Review of Systems   Constitutional: Negative for chills, fever and unexpected weight change.   HENT: Negative for ear pain, rhinorrhea and sore throat.    Eyes: Negative for pain and visual disturbance.   Respiratory: Negative for cough and shortness of breath.    Cardiovascular: Negative for chest pain, palpitations and leg swelling.   Gastrointestinal: Negative for abdominal pain, diarrhea, nausea and vomiting.   Genitourinary: Negative for difficulty urinating, hematuria and vaginal bleeding.   Musculoskeletal: Negative for arthralgias.   Skin: Negative for rash.   Neurological: Negative for dizziness, weakness and headaches.   Psychiatric/Behavioral: Negative for agitation and sleep disturbance. The patient is not nervous/anxious.           Objective:      Vitals:    01/06/22 1258   BP: 126/78   Pulse: 76   Weight: 91.5 kg (201 lb 12.8 oz)   Height: 5' 3.5" (1.613 m)     Physical " Exam  Vitals reviewed.   Constitutional:       Appearance: She is well-developed and well-nourished.   HENT:      Head: Normocephalic.      Right Ear: External ear normal.      Left Ear: External ear normal.      Mouth/Throat:      Mouth: Oropharynx is clear and moist.   Eyes:      Conjunctiva/sclera: Conjunctivae normal.      Pupils: Pupils are equal, round, and reactive to light.   Neck:      Vascular: No JVD.   Cardiovascular:      Rate and Rhythm: Normal rate and regular rhythm.      Heart sounds: No murmur heard.      Pulmonary:      Effort: Pulmonary effort is normal.      Breath sounds: Normal breath sounds.   Abdominal:      General: Bowel sounds are normal.      Palpations: Abdomen is soft.   Musculoskeletal:         General: No deformity or edema.      Cervical back: Normal range of motion and neck supple.      Lumbar back: Decreased range of motion. Negative right straight leg raise test and negative left straight leg raise test.   Lymphadenopathy:      Cervical: No cervical adenopathy.   Skin:     General: Skin is warm, dry and intact.      Findings: No rash.   Neurological:      Mental Status: She is alert and oriented to person, place, and time.      Gait: Gait normal.   Psychiatric:         Mood and Affect: Mood and affect normal.         Speech: Speech normal.         Behavior: Behavior normal.           Assessment:       1. Coagulation disorder    2. Hypertension, unspecified type    3. Low back pain, unspecified back pain laterality, unspecified chronicity, unspecified whether sciatica present    4. Hyperlipidemia, unspecified hyperlipidemia type    5. Anxiety disorder, unspecified type    6. Gastroesophageal reflux disease, unspecified whether esophagitis present    7. High risk medication use    8. Dorsalgia, unspecified    9. History of TIA (transient ischemic attack)         Plan:       Coagulation disorder    Hypertension, unspecified type  -     amLODIPine (NORVASC) 5 MG tablet; Take 1 tablet  (5 mg total) by mouth once daily.  Dispense: 30 tablet; Refill: 5    Low back pain, unspecified back pain laterality, unspecified chronicity, unspecified whether sciatica present  -     cyclobenzaprine (FLEXERIL) 10 MG tablet; Take 1 tablet (10 mg total) by mouth 3 (three) times daily as needed for Muscle spasms.  Dispense: 90 tablet; Refill: 0    Hyperlipidemia, unspecified hyperlipidemia type    Anxiety disorder, unspecified type    Gastroesophageal reflux disease, unspecified whether esophagitis present    High risk medication use    Dorsalgia, unspecified    History of TIA (transient ischemic attack)      Follow up in about 3 months (around 4/6/2022), or if symptoms worsen or fail to improve, for medication management.        1/6/2022 Angi Barrett

## 2022-01-07 RX ORDER — HYDROCODONE BITARTRATE AND ACETAMINOPHEN 5; 325 MG/1; MG/1
1 TABLET ORAL EVERY 12 HOURS PRN
Qty: 60 TABLET | Refills: 0 | Status: SHIPPED | OUTPATIENT
Start: 2022-01-07 | End: 2022-02-06 | Stop reason: SDUPTHER

## 2022-01-07 RX ORDER — HYDROCODONE BITARTRATE AND ACETAMINOPHEN 5; 325 MG/1; MG/1
1 TABLET ORAL EVERY 12 HOURS PRN
Qty: 60 TABLET | Refills: 0 | Status: SHIPPED | OUTPATIENT
Start: 2022-02-06 | End: 2022-03-09

## 2022-01-07 NOTE — PATIENT INSTRUCTIONS
Back Exercise to Keep Fit for Low Back Pain  To help in your recovery and to prevent further back problems, keep your back, abdominal muscles and legs strong. Walk daily as soon as you can. Gradually add other physical activities such as swimming and biking, which can help improve lower back strength. Begin as soon as you can do them comfortably. Do not do any exercises that make your pain a lot worse. The following are some back exercises that can help relieve low back pain.     Pelvic tilt   Repeat 5-10 times, twice per day.  Lie flat on your back (or stand with your back to a wall), knees bent, feet flat on the floor, body relaxed. Tighten your abdominal and buttock muscles, and tilt your pelvis. The curve of the small of your back should flatten towards the floor (or wall). Hold 10 seconds and then relax.       Knee raise     Repeat 5-10 times, twice per day.    Lie flat on your back, knees bent. Bring one knee slowly to your chest. Hug your knee gently. Then lower your leg toward the floor, keeping your knee bent. Do not straighten your legs. Repeat exercise with your other leg.              Partial press-up     Lie face down on a soft, firm surface. Do not turn your head to either side. Rest your arms bent at the elbows alongside your body. Relax for a few minutes. Then raise your upper body enough to lean on your elbows. Relax your lower back and legs as much as possible. Hold this position for 30 seconds at first. Gradually work up to five minutes. Or try slow press-ups. Hold each for five seconds, and repeat five to six times.              Copyright © 2012 by [employer]   Alicia WISEMAN, Grace BRYAN, Yuan DOVER, Tiburcio SAMUELS, Erwin R, Eula B, Byron K, Talon C, Kim B, Alek S, Christiano ROJAS, Gerri R. [employer]. Adult Acute and Subacute Low Back Pain. Updated November 2012.

## 2022-01-09 LAB
ALBUMIN SERPL-MCNC: 4.2 G/DL (ref 3.6–5.1)
ALBUMIN/GLOB SERPL: 1.4 (CALC) (ref 1–2.5)
ALP SERPL-CCNC: 97 U/L (ref 37–153)
ALT SERPL-CCNC: 22 U/L (ref 6–29)
APPEARANCE UR: CLEAR
AST SERPL-CCNC: 21 U/L (ref 10–35)
BACTERIA #/AREA URNS HPF: ABNORMAL /HPF
BACTERIA UR CULT: ABNORMAL
BACTERIA UR CULT: ABNORMAL
BASOPHILS # BLD AUTO: 71 CELLS/UL (ref 0–200)
BASOPHILS NFR BLD AUTO: 1 %
BILIRUB SERPL-MCNC: 0.4 MG/DL (ref 0.2–1.2)
BILIRUB UR QL STRIP: NEGATIVE
BUN SERPL-MCNC: 13 MG/DL (ref 7–25)
BUN/CREAT SERPL: NORMAL (CALC) (ref 6–22)
CALCIUM SERPL-MCNC: 9.1 MG/DL (ref 8.6–10.4)
CHLORIDE SERPL-SCNC: 105 MMOL/L (ref 98–110)
CHOLEST SERPL-MCNC: 226 MG/DL
CHOLEST/HDLC SERPL: 4.1 (CALC)
CO2 SERPL-SCNC: 27 MMOL/L (ref 20–32)
COLOR UR: ABNORMAL
CREAT SERPL-MCNC: 0.81 MG/DL (ref 0.5–0.99)
EOSINOPHIL # BLD AUTO: 156 CELLS/UL (ref 15–500)
EOSINOPHIL NFR BLD AUTO: 2.2 %
ERYTHROCYTE [DISTWIDTH] IN BLOOD BY AUTOMATED COUNT: 12 % (ref 11–15)
GLOBULIN SER CALC-MCNC: 3.1 G/DL (CALC) (ref 1.9–3.7)
GLUCOSE SERPL-MCNC: 84 MG/DL (ref 65–99)
GLUCOSE UR QL STRIP: NEGATIVE
HCT VFR BLD AUTO: 46.4 % (ref 35–45)
HDLC SERPL-MCNC: 55 MG/DL
HGB BLD-MCNC: 15.5 G/DL (ref 11.7–15.5)
HGB UR QL STRIP: NEGATIVE
HYALINE CASTS #/AREA URNS LPF: ABNORMAL /LPF
KETONES UR QL STRIP: NEGATIVE
LDLC SERPL CALC-MCNC: 145 MG/DL (CALC)
LEUKOCYTE ESTERASE UR QL STRIP: ABNORMAL
LYMPHOCYTES # BLD AUTO: 2513 CELLS/UL (ref 850–3900)
LYMPHOCYTES NFR BLD AUTO: 35.4 %
MCH RBC QN AUTO: 28.6 PG (ref 27–33)
MCHC RBC AUTO-ENTMCNC: 33.4 G/DL (ref 32–36)
MCV RBC AUTO: 85.6 FL (ref 80–100)
MONOCYTES # BLD AUTO: 462 CELLS/UL (ref 200–950)
MONOCYTES NFR BLD AUTO: 6.5 %
NEUTROPHILS # BLD AUTO: 3898 CELLS/UL (ref 1500–7800)
NEUTROPHILS NFR BLD AUTO: 54.9 %
NITRITE UR QL STRIP: NEGATIVE
NONHDLC SERPL-MCNC: 171 MG/DL (CALC)
PH UR STRIP: 5.5 [PH] (ref 5–8)
PLATELET # BLD AUTO: 314 THOUSAND/UL (ref 140–400)
PMV BLD REES-ECKER: 9.5 FL (ref 7.5–12.5)
POTASSIUM SERPL-SCNC: 4.2 MMOL/L (ref 3.5–5.3)
PROT SERPL-MCNC: 7.3 G/DL (ref 6.1–8.1)
PROT UR QL STRIP: NEGATIVE
RBC # BLD AUTO: 5.42 MILLION/UL (ref 3.8–5.1)
RBC #/AREA URNS HPF: ABNORMAL /HPF
SODIUM SERPL-SCNC: 141 MMOL/L (ref 135–146)
SP GR UR STRIP: 1.02 (ref 1–1.03)
SQUAMOUS #/AREA URNS HPF: ABNORMAL /HPF
TRIGL SERPL-MCNC: 132 MG/DL
TSH SERPL-ACNC: 1.4 MIU/L (ref 0.4–4.5)
WBC # BLD AUTO: 7.1 THOUSAND/UL (ref 3.8–10.8)
WBC #/AREA URNS HPF: ABNORMAL /HPF

## 2022-02-06 DIAGNOSIS — M54.50 LOW BACK PAIN, UNSPECIFIED BACK PAIN LATERALITY, UNSPECIFIED CHRONICITY, UNSPECIFIED WHETHER SCIATICA PRESENT: ICD-10-CM

## 2022-02-07 DIAGNOSIS — M54.50 LOW BACK PAIN, UNSPECIFIED BACK PAIN LATERALITY, UNSPECIFIED CHRONICITY, UNSPECIFIED WHETHER SCIATICA PRESENT: ICD-10-CM

## 2022-02-07 RX ORDER — HYDROCODONE BITARTRATE AND ACETAMINOPHEN 5; 325 MG/1; MG/1
1 TABLET ORAL EVERY 12 HOURS PRN
Qty: 60 TABLET | Refills: 0 | Status: CANCELLED | OUTPATIENT
Start: 2022-02-07

## 2022-02-07 RX ORDER — CYCLOBENZAPRINE HCL 10 MG
10 TABLET ORAL 3 TIMES DAILY PRN
Qty: 90 TABLET | Refills: 0 | Status: CANCELLED | OUTPATIENT
Start: 2022-02-07

## 2022-02-07 RX ORDER — HYDROCODONE BITARTRATE AND ACETAMINOPHEN 5; 325 MG/1; MG/1
1 TABLET ORAL EVERY 12 HOURS PRN
Qty: 60 TABLET | Refills: 0 | Status: SHIPPED | OUTPATIENT
Start: 2022-02-07 | End: 2022-03-07 | Stop reason: SDUPTHER

## 2022-02-07 RX ORDER — CYCLOBENZAPRINE HCL 10 MG
10 TABLET ORAL 3 TIMES DAILY PRN
Qty: 90 TABLET | Refills: 0 | Status: SHIPPED | OUTPATIENT
Start: 2022-02-07 | End: 2022-03-09 | Stop reason: SDUPTHER

## 2022-03-02 DIAGNOSIS — D68.9 COAGULATION DISORDER: ICD-10-CM

## 2022-03-02 RX ORDER — APIXABAN 5 MG/1
5 TABLET, FILM COATED ORAL 2 TIMES DAILY
Qty: 180 TABLET | Refills: 1 | Status: SHIPPED | OUTPATIENT
Start: 2022-03-02 | End: 2022-03-09 | Stop reason: SDUPTHER

## 2022-03-02 NOTE — TELEPHONE ENCOUNTER
----- Message from Emily Shah sent at 3/2/2022  3:22 PM CST -----  Jennifer with [facility] called and stated that the patient is new to them and she need a prescription ELIQUIS 5 mg if any questions please give her a call at [phone]

## 2022-03-07 DIAGNOSIS — M54.50 LOW BACK PAIN, UNSPECIFIED BACK PAIN LATERALITY, UNSPECIFIED CHRONICITY, UNSPECIFIED WHETHER SCIATICA PRESENT: ICD-10-CM

## 2022-03-07 RX ORDER — CYCLOBENZAPRINE HCL 10 MG
10 TABLET ORAL 3 TIMES DAILY PRN
Qty: 90 TABLET | Refills: 0 | Status: CANCELLED | OUTPATIENT
Start: 2022-03-07

## 2022-03-07 RX ORDER — HYDROCODONE BITARTRATE AND ACETAMINOPHEN 5; 325 MG/1; MG/1
1 TABLET ORAL EVERY 12 HOURS PRN
Qty: 60 TABLET | Refills: 0 | Status: SHIPPED | OUTPATIENT
Start: 2022-03-07 | End: 2022-04-07 | Stop reason: SDUPTHER

## 2022-03-09 ENCOUNTER — HOSPITAL ENCOUNTER (OUTPATIENT)
Dept: RADIOLOGY | Facility: HOSPITAL | Age: 62
Discharge: HOME | End: 2022-03-09
Attending: NURSE PRACTITIONER
Payer: COMMERCIAL

## 2022-03-09 ENCOUNTER — OFFICE VISIT (OUTPATIENT)
Dept: FAMILY MEDICINE | Facility: CLINIC | Age: 62
End: 2022-03-09
Payer: COMMERCIAL

## 2022-03-09 VITALS
WEIGHT: 207 LBS | BODY MASS INDEX: 35.34 KG/M2 | DIASTOLIC BLOOD PRESSURE: 80 MMHG | HEART RATE: 76 BPM | HEIGHT: 64 IN | SYSTOLIC BLOOD PRESSURE: 132 MMHG

## 2022-03-09 DIAGNOSIS — Z12.11 SPECIAL SCREENING FOR MALIGNANT NEOPLASMS, COLON: ICD-10-CM

## 2022-03-09 DIAGNOSIS — M25.532 LEFT WRIST PAIN: ICD-10-CM

## 2022-03-09 DIAGNOSIS — F41.9 ANXIETY DISORDER, UNSPECIFIED TYPE: ICD-10-CM

## 2022-03-09 DIAGNOSIS — E78.2 MIXED HYPERLIPIDEMIA: ICD-10-CM

## 2022-03-09 DIAGNOSIS — D68.9 COAGULATION DISORDER: ICD-10-CM

## 2022-03-09 DIAGNOSIS — M54.50 LOW BACK PAIN, UNSPECIFIED BACK PAIN LATERALITY, UNSPECIFIED CHRONICITY, UNSPECIFIED WHETHER SCIATICA PRESENT: ICD-10-CM

## 2022-03-09 DIAGNOSIS — K59.00 CONSTIPATION, UNSPECIFIED CONSTIPATION TYPE: ICD-10-CM

## 2022-03-09 DIAGNOSIS — Z51.81 ENCOUNTER FOR THERAPEUTIC DRUG MONITORING: Primary | ICD-10-CM

## 2022-03-09 DIAGNOSIS — I10 HYPERTENSION, UNSPECIFIED TYPE: ICD-10-CM

## 2022-03-09 DIAGNOSIS — Z79.899 ENCOUNTER FOR LONG-TERM (CURRENT) USE OF OTHER MEDICATIONS: ICD-10-CM

## 2022-03-09 PROCEDURE — 3079F PR MOST RECENT DIASTOLIC BLOOD PRESSURE 80-89 MM HG: ICD-10-PCS | Mod: S$GLB,,, | Performed by: NURSE PRACTITIONER

## 2022-03-09 PROCEDURE — 99214 PR OFFICE/OUTPT VISIT, EST, LEVL IV, 30-39 MIN: ICD-10-PCS | Mod: S$GLB,,, | Performed by: NURSE PRACTITIONER

## 2022-03-09 PROCEDURE — 3008F BODY MASS INDEX DOCD: CPT | Mod: S$GLB,,, | Performed by: NURSE PRACTITIONER

## 2022-03-09 PROCEDURE — 3008F PR BODY MASS INDEX (BMI) DOCUMENTED: ICD-10-PCS | Mod: S$GLB,,, | Performed by: NURSE PRACTITIONER

## 2022-03-09 PROCEDURE — 99214 OFFICE O/P EST MOD 30 MIN: CPT | Mod: S$GLB,,, | Performed by: NURSE PRACTITIONER

## 2022-03-09 PROCEDURE — 3075F SYST BP GE 130 - 139MM HG: CPT | Mod: S$GLB,,, | Performed by: NURSE PRACTITIONER

## 2022-03-09 PROCEDURE — 73110 X-RAY EXAM OF WRIST: CPT | Mod: TC,PO,LT

## 2022-03-09 PROCEDURE — 3079F DIAST BP 80-89 MM HG: CPT | Mod: S$GLB,,, | Performed by: NURSE PRACTITIONER

## 2022-03-09 PROCEDURE — 3075F PR MOST RECENT SYSTOLIC BLOOD PRESS GE 130-139MM HG: ICD-10-PCS | Mod: S$GLB,,, | Performed by: NURSE PRACTITIONER

## 2022-03-09 RX ORDER — CYCLOBENZAPRINE HCL 10 MG
10 TABLET ORAL 3 TIMES DAILY PRN
Qty: 90 TABLET | Refills: 0 | Status: SHIPPED | OUTPATIENT
Start: 2022-03-09 | End: 2022-04-07 | Stop reason: SDUPTHER

## 2022-03-09 RX ORDER — DOCUSATE SODIUM 100 MG/1
100 CAPSULE, LIQUID FILLED ORAL 2 TIMES DAILY
Qty: 60 CAPSULE | Refills: 3 | Status: SHIPPED | OUTPATIENT
Start: 2022-03-09 | End: 2022-07-06

## 2022-03-09 RX ORDER — PANTOPRAZOLE SODIUM 40 MG/1
40 TABLET, DELAYED RELEASE ORAL DAILY
Qty: 30 TABLET | Refills: 11 | Status: SHIPPED | OUTPATIENT
Start: 2022-03-09 | End: 2022-04-08 | Stop reason: SDUPTHER

## 2022-03-09 RX ORDER — APIXABAN 5 MG/1
5 TABLET, FILM COATED ORAL 2 TIMES DAILY
Qty: 180 TABLET | Refills: 1 | Status: SHIPPED | OUTPATIENT
Start: 2022-03-09 | End: 2022-04-08 | Stop reason: SDUPTHER

## 2022-03-09 RX ORDER — PRAVASTATIN SODIUM 40 MG/1
40 TABLET ORAL DAILY
Qty: 30 TABLET | Refills: 5 | Status: SHIPPED | OUTPATIENT
Start: 2022-03-09 | End: 2022-04-08 | Stop reason: SDUPTHER

## 2022-03-09 RX ORDER — DULOXETIN HYDROCHLORIDE 30 MG/1
30 CAPSULE, DELAYED RELEASE ORAL DAILY
Qty: 30 CAPSULE | Refills: 5 | Status: SHIPPED | OUTPATIENT
Start: 2022-03-09 | End: 2022-07-06

## 2022-03-09 RX ORDER — AMLODIPINE BESYLATE 5 MG/1
5 TABLET ORAL DAILY
Qty: 30 TABLET | Refills: 5 | Status: SHIPPED | OUTPATIENT
Start: 2022-03-09 | End: 2022-04-08 | Stop reason: SDUPTHER

## 2022-03-14 ENCOUNTER — TELEPHONE (OUTPATIENT)
Dept: FAMILY MEDICINE | Facility: CLINIC | Age: 62
End: 2022-03-14
Payer: COMMERCIAL

## 2022-03-14 NOTE — PROGRESS NOTES
SUBJECTIVE:    Patient ID: Jessica Mitchell is a 61 y.o. female.    Chief Complaint: Wrist Pain (Left wrist pain after a fall  // mammogram - refused // colonoscopy - due, agrees to schedule ac) and Knee Pain (Right knee pain after a fall on 2/25/22 /)    61 YEAR OLD FEMALE PRESENTS FOR URGENT VISIT.  TREATED FOR ANXIETY, HTN, HYPERLIPIDEMIA, GERD ON ELIQUIS DAILY. TAKING MEDS AS PRESCRIBED. STILL WORKING BY [employer]. REPORTS THAT ABOUT 2 WEEKS AGO WAS WALKING INTO HOUSE. TRIPPED ON SIDEWALK. REACHED HANDS IN FRONT TO BREAK FALL. HAS BEEN HAVING PAIN TO LEFT WRIST , KNEES SINCE. NO LOSS OF CONSCIOUSNESS. PAIN IS WORSE WITH MOVEMENT. DOES SEEM TO BE GETTING SLIGHTLY BETTER.  HAS INTERMITTENT BACK PAIN AFTER BEING ON FEET ALL DAY. TAKES NORCO WITH IMPROVEMENT OF SYMPTOMS. BOWEL MOVEMENTS HAVE BECOME MORE REGULAR 3-4 X PER WEEK. TAKES LINZESS. NEEDS REFILLS ON MEDS SLEEPING BETTER.       Office Visit on 09/27/2021   Component Date Value Ref Range Status    WBC 01/06/2022 7.1  3.8 - 10.8 Thousand/uL Final    RBC 01/06/2022 5.42 (A) 3.80 - 5.10 Million/uL Final    Hemoglobin 01/06/2022 15.5  11.7 - 15.5 g/dL Final    Hematocrit 01/06/2022 46.4 (A) 35.0 - 45.0 % Final    MCV 01/06/2022 85.6  80.0 - 100.0 fL Final    MCH 01/06/2022 28.6  27.0 - 33.0 pg Final    MCHC 01/06/2022 33.4  32.0 - 36.0 g/dL Final    RDW 01/06/2022 12.0  11.0 - 15.0 % Final    Platelets 01/06/2022 314  140 - 400 Thousand/uL Final    MPV 01/06/2022 9.5  7.5 - 12.5 fL Final    Neutrophils, Abs 01/06/2022 3,898  1,500 - 7,800 cells/uL Final    Lymph # 01/06/2022 2,513  850 - 3,900 cells/uL Final    Mono # 01/06/2022 462  200 - 950 cells/uL Final    Eos # 01/06/2022 156  15 - 500 cells/uL Final    Baso # 01/06/2022 71  0 - 200 cells/uL Final    Neutrophils Relative 01/06/2022 54.9  % Final    Lymph % 01/06/2022 35.4  % Final    Mono % 01/06/2022 6.5  % Final    Eosinophil % 01/06/2022 2.2  % Final    Basophil % 01/06/2022  1.0  % Final    Glucose 01/06/2022 84  65 - 99 mg/dL Final    BUN 01/06/2022 13  7 - 25 mg/dL Final    Creatinine 01/06/2022 0.81  0.50 - 0.99 mg/dL Final    eGFR if non African American 01/06/2022 79  > OR = 60 mL/min/1.73m2 Final    eGFR if  01/06/2022 91  > OR = 60 mL/min/1.73m2 Final    BUN/Creatinine Ratio 01/06/2022 NOT APPLICABLE  6 - 22 (calc) Final    Sodium 01/06/2022 141  135 - 146 mmol/L Final    Potassium 01/06/2022 4.2  3.5 - 5.3 mmol/L Final    Chloride 01/06/2022 105  98 - 110 mmol/L Final    CO2 01/06/2022 27  20 - 32 mmol/L Final    Calcium 01/06/2022 9.1  8.6 - 10.4 mg/dL Final    Total Protein 01/06/2022 7.3  6.1 - 8.1 g/dL Final    Albumin 01/06/2022 4.2  3.6 - 5.1 g/dL Final    Globulin, Total 01/06/2022 3.1  1.9 - 3.7 g/dL (calc) Final    Albumin/Globulin Ratio 01/06/2022 1.4  1.0 - 2.5 (calc) Final    Total Bilirubin 01/06/2022 0.4  0.2 - 1.2 mg/dL Final    Alkaline Phosphatase 01/06/2022 97  37 - 153 U/L Final    AST 01/06/2022 21  10 - 35 U/L Final    ALT 01/06/2022 22  6 - 29 U/L Final    Cholesterol 01/06/2022 226 (A) <200 mg/dL Final    HDL 01/06/2022 55  > OR = 50 mg/dL Final    Triglycerides 01/06/2022 132  <150 mg/dL Final    LDL Cholesterol 01/06/2022 145 (A) mg/dL (calc) Final    HDL/Cholesterol Ratio 01/06/2022 4.1  <5.0 (calc) Final    Non HDL Chol. (LDL+VLDL) 01/06/2022 171 (A) <130 mg/dL (calc) Final    TSH w/reflex to FT4 01/06/2022 1.40  0.40 - 4.50 mIU/L Final    Color, UA 01/06/2022 DARK YELLOW  YELLOW Final    Appearance, UA 01/06/2022 CLEAR  CLEAR Final    Specific [place], UA 01/06/2022 1.024  1.001 - 1.035 Final    pH, UA 01/06/2022 5.5  5.0 - 8.0 Final    Glucose, UA 01/06/2022 NEGATIVE  NEGATIVE Final    Bilirubin, UA 01/06/2022 NEGATIVE  NEGATIVE Final    Ketones, UA 01/06/2022 NEGATIVE  NEGATIVE Final    Occult Blood UA 01/06/2022 NEGATIVE  NEGATIVE Final    Protein, UA 01/06/2022 NEGATIVE  NEGATIVE Final     Nitrite, UA 01/06/2022 NEGATIVE  NEGATIVE Final    Leukocytes, UA 01/06/2022 TRACE (A) NEGATIVE Final    WBC Casts, UA 01/06/2022 0-5  < OR = 5 /HPF Final    RBC Casts, UA 01/06/2022 0-2  < OR = 2 /HPF Final    Squam Epithel, UA 01/06/2022 6-10 (A) < OR = 5 /HPF Final    Bacteria, UA 01/06/2022 NONE SEEN  NONE SEEN /HPF Final    Hyaline Casts, UA 01/06/2022 NONE SEEN  NONE SEEN /LPF Final    Reflexive Urine Culture 01/06/2022    Final    Urine Culture, Routine 01/06/2022    Final       Past Medical History:   Diagnosis Date    Adrenal tumor     DVT (deep venous thrombosis) 2012    Hiatal hernia     HTN (hypertension)     Multiple thyroid nodules     two    Stomach ulcer     Stroke     at 31 years old     Social History     Socioeconomic History    Marital status:    Tobacco Use    Smoking status: Former Smoker    Smokeless tobacco: Never Used   Substance and Sexual Activity    Alcohol use: Yes     Alcohol/week: 0.0 standard drinks     Comment: socially    Drug use: No     Past Surgical History:   Procedure Laterality Date    Bilatweral Tubal ligation      ESOPHAGEAL DILATION       No family history on file.    Review of patient's allergies indicates:   Allergen Reactions    Codeine Itching    Codeine sulfate      Other reaction(s): Unknown    Lisinopril Other (See Comments)     cough  Other reaction(s): Unknown       Current Outpatient Medications:     amLODIPine (NORVASC) 5 MG tablet, Take 1 tablet (5 mg total) by mouth once daily., Disp: 30 tablet, Rfl: 5    cyclobenzaprine (FLEXERIL) 10 MG tablet, Take 1 tablet (10 mg total) by mouth 3 (three) times daily as needed for Muscle spasms., Disp: 90 tablet, Rfl: 0    docusate sodium (COLACE) 100 MG capsule, Take 1 capsule (100 mg total) by mouth 2 (two) times daily., Disp: 60 capsule, Rfl: 3    DULoxetine (CYMBALTA) 30 MG capsule, Take 1 capsule (30 mg total) by mouth once daily., Disp: 30 capsule, Rfl: 5    ELIQUIS 5 mg Tab, Take 1  "tablet (5 mg total) by mouth 2 (two) times daily., Disp: 180 tablet, Rfl: 1    HYDROcodone-acetaminophen (NORCO) 5-325 mg per tablet, Take 1 tablet by mouth every 12 (twelve) hours as needed for Pain., Disp: 60 tablet, Rfl: 0    linaCLOtide (LINZESS) 145 mcg Cap capsule, Take 145 mcg by mouth once daily., Disp: , Rfl:     pantoprazole (PROTONIX) 40 MG tablet, Take 1 tablet (40 mg total) by mouth once daily., Disp: 30 tablet, Rfl: 11    pravastatin (PRAVACHOL) 40 MG tablet, Take 1 tablet (40 mg total) by mouth once daily., Disp: 30 tablet, Rfl: 5    Review of Systems   Constitutional: Negative for chills, fever and unexpected weight change.   HENT: Negative for ear pain, rhinorrhea and sore throat.    Eyes: Negative for pain and visual disturbance.   Respiratory: Negative for cough and shortness of breath.    Cardiovascular: Negative for chest pain, palpitations and leg swelling.   Gastrointestinal: Negative for abdominal pain, diarrhea, nausea and vomiting.   Genitourinary: Negative for difficulty urinating, hematuria and vaginal bleeding.   Musculoskeletal: Negative for arthralgias.        LEFT WRIST   Skin: Negative for rash.   Neurological: Negative for dizziness, weakness and headaches.   Psychiatric/Behavioral: Negative for agitation and sleep disturbance. The patient is not nervous/anxious.           Objective:      Vitals:    03/09/22 1237   BP: 132/80   Pulse: 76   Weight: 93.9 kg (207 lb)   Height: 5' 3.5" (1.613 m)     Physical Exam  Constitutional:       Appearance: She is well-developed.   HENT:      Head: Normocephalic.      Left Ear: External ear normal.   Neck:      Vascular: No JVD.   Cardiovascular:      Rate and Rhythm: Normal rate and regular rhythm.      Heart sounds: No murmur heard.  Pulmonary:      Effort: Pulmonary effort is normal.      Breath sounds: Normal breath sounds.   Abdominal:      General: Bowel sounds are normal.      Palpations: Abdomen is soft.   Musculoskeletal:      Left " wrist: Swelling and deformity present. No snuff box tenderness. Decreased range of motion.      Cervical back: Normal range of motion and neck supple.   Lymphadenopathy:      Cervical: No cervical adenopathy.   Skin:     General: Skin is warm and dry.      Findings: No rash.   Neurological:      Mental Status: She is alert and oriented to person, place, and time.      Gait: Gait normal.   Psychiatric:         Speech: Speech normal.         Behavior: Behavior normal.           Assessment:       1. Encounter for therapeutic drug monitoring    2. Encounter for long-term (current) use of other medications    3. Low back pain, unspecified back pain laterality, unspecified chronicity, unspecified whether sciatica present    4. Special screening for malignant neoplasms, colon    5. Hypertension, unspecified type    6. Constipation, unspecified constipation type    7. Anxiety disorder, unspecified type    8. Coagulation disorder    9. Mixed hyperlipidemia Well controlled   10. Left wrist pain         Plan:       Encounter for therapeutic drug monitoring  -     DRUG MONITOR, PANEL 4, W/CONF, URINE; Future; Expected date: 03/09/2022    Encounter for long-term (current) use of other medications  -     DRUG MONITOR, PANEL 4, W/CONF, URINE; Future; Expected date: 03/09/2022    Low back pain, unspecified back pain laterality, unspecified chronicity, unspecified whether sciatica present  -     DRUG MONITOR, PANEL 4, W/CONF, URINE; Future; Expected date: 03/09/2022  -     cyclobenzaprine (FLEXERIL) 10 MG tablet; Take 1 tablet (10 mg total) by mouth 3 (three) times daily as needed for Muscle spasms.  Dispense: 90 tablet; Refill: 0    Special screening for malignant neoplasms, colon  -     Ambulatory referral/consult to Gastroenterology; Future; Expected date: 03/16/2022    Hypertension, unspecified type  -     amLODIPine (NORVASC) 5 MG tablet; Take 1 tablet (5 mg total) by mouth once daily.  Dispense: 30 tablet; Refill:  5    Constipation, unspecified constipation type  -     docusate sodium (COLACE) 100 MG capsule; Take 1 capsule (100 mg total) by mouth 2 (two) times daily.  Dispense: 60 capsule; Refill: 3    Anxiety disorder, unspecified type  -     DULoxetine (CYMBALTA) 30 MG capsule; Take 1 capsule (30 mg total) by mouth once daily.  Dispense: 30 capsule; Refill: 5    Coagulation disorder  -     ELIQUIS 5 mg Tab; Take 1 tablet (5 mg total) by mouth 2 (two) times daily.  Dispense: 180 tablet; Refill: 1    Mixed hyperlipidemia  -     pravastatin (PRAVACHOL) 40 MG tablet; Take 1 tablet (40 mg total) by mouth once daily.  Dispense: 30 tablet; Refill: 5    Left wrist pain  -     Cancel: X-Ray Wrist 2 View Left; Future    Other orders  -     pantoprazole (PROTONIX) 40 MG tablet; Take 1 tablet (40 mg total) by mouth once daily.  Dispense: 30 tablet; Refill: 11      Follow up in about 3 months (around 6/9/2022), or if symptoms worsen or fail to improve, for medication management.        3/14/2022 Angi Barrett

## 2022-03-14 NOTE — TELEPHONE ENCOUNTER
----- Message from Justina Reddy LPN sent at 3/14/2022  9:25 AM CDT -----    ----- Message -----  From: Angi Barrett NP  Sent: 3/14/2022   9:15 AM CDT  To: Angi Barrett Staff    WRIST XRAY SHOWS NO FRACTURE

## 2022-04-06 ENCOUNTER — PATIENT MESSAGE (OUTPATIENT)
Dept: FAMILY MEDICINE | Facility: CLINIC | Age: 62
End: 2022-04-06
Payer: COMMERCIAL

## 2022-04-06 ENCOUNTER — OFFICE VISIT (OUTPATIENT)
Dept: FAMILY MEDICINE | Facility: CLINIC | Age: 62
End: 2022-04-06
Payer: COMMERCIAL

## 2022-04-06 VITALS
SYSTOLIC BLOOD PRESSURE: 120 MMHG | HEIGHT: 64 IN | DIASTOLIC BLOOD PRESSURE: 74 MMHG | HEART RATE: 80 BPM | WEIGHT: 207 LBS | BODY MASS INDEX: 35.34 KG/M2

## 2022-04-06 DIAGNOSIS — Z79.899 HIGH RISK MEDICATION USE: ICD-10-CM

## 2022-04-06 DIAGNOSIS — I10 HYPERTENSION, UNSPECIFIED TYPE: ICD-10-CM

## 2022-04-06 DIAGNOSIS — K21.9 GASTROESOPHAGEAL REFLUX DISEASE, UNSPECIFIED WHETHER ESOPHAGITIS PRESENT: Primary | ICD-10-CM

## 2022-04-06 DIAGNOSIS — M54.50 LOW BACK PAIN, UNSPECIFIED BACK PAIN LATERALITY, UNSPECIFIED CHRONICITY, UNSPECIFIED WHETHER SCIATICA PRESENT: ICD-10-CM

## 2022-04-06 DIAGNOSIS — E78.2 MIXED HYPERLIPIDEMIA: ICD-10-CM

## 2022-04-06 DIAGNOSIS — K59.00 CONSTIPATION, UNSPECIFIED CONSTIPATION TYPE: ICD-10-CM

## 2022-04-06 DIAGNOSIS — E78.5 HYPERLIPIDEMIA, UNSPECIFIED HYPERLIPIDEMIA TYPE: ICD-10-CM

## 2022-04-06 DIAGNOSIS — D68.9 COAGULATION DISORDER: ICD-10-CM

## 2022-04-06 DIAGNOSIS — F41.9 ANXIETY DISORDER, UNSPECIFIED TYPE: ICD-10-CM

## 2022-04-06 PROCEDURE — 99214 OFFICE O/P EST MOD 30 MIN: CPT | Mod: S$GLB,,, | Performed by: NURSE PRACTITIONER

## 2022-04-06 PROCEDURE — 1160F PR REVIEW ALL MEDS BY PRESCRIBER/CLIN PHARMACIST DOCUMENTED: ICD-10-PCS | Mod: S$GLB,,, | Performed by: NURSE PRACTITIONER

## 2022-04-06 PROCEDURE — 3008F PR BODY MASS INDEX (BMI) DOCUMENTED: ICD-10-PCS | Mod: S$GLB,,, | Performed by: NURSE PRACTITIONER

## 2022-04-06 PROCEDURE — 3074F SYST BP LT 130 MM HG: CPT | Mod: S$GLB,,, | Performed by: NURSE PRACTITIONER

## 2022-04-06 PROCEDURE — 3074F PR MOST RECENT SYSTOLIC BLOOD PRESSURE < 130 MM HG: ICD-10-PCS | Mod: S$GLB,,, | Performed by: NURSE PRACTITIONER

## 2022-04-06 PROCEDURE — 1160F RVW MEDS BY RX/DR IN RCRD: CPT | Mod: S$GLB,,, | Performed by: NURSE PRACTITIONER

## 2022-04-06 PROCEDURE — 3078F DIAST BP <80 MM HG: CPT | Mod: S$GLB,,, | Performed by: NURSE PRACTITIONER

## 2022-04-06 PROCEDURE — 3008F BODY MASS INDEX DOCD: CPT | Mod: S$GLB,,, | Performed by: NURSE PRACTITIONER

## 2022-04-06 PROCEDURE — 3078F PR MOST RECENT DIASTOLIC BLOOD PRESSURE < 80 MM HG: ICD-10-PCS | Mod: S$GLB,,, | Performed by: NURSE PRACTITIONER

## 2022-04-06 PROCEDURE — 99214 PR OFFICE/OUTPT VISIT, EST, LEVL IV, 30-39 MIN: ICD-10-PCS | Mod: S$GLB,,, | Performed by: NURSE PRACTITIONER

## 2022-04-06 RX ORDER — CYCLOBENZAPRINE HCL 10 MG
10 TABLET ORAL 3 TIMES DAILY PRN
Qty: 90 TABLET | Refills: 0 | Status: CANCELLED | OUTPATIENT
Start: 2022-04-06

## 2022-04-07 ENCOUNTER — TELEPHONE (OUTPATIENT)
Dept: FAMILY MEDICINE | Facility: CLINIC | Age: 62
End: 2022-04-07
Payer: COMMERCIAL

## 2022-04-07 ENCOUNTER — PATIENT MESSAGE (OUTPATIENT)
Dept: FAMILY MEDICINE | Facility: CLINIC | Age: 62
End: 2022-04-07

## 2022-04-07 RX ORDER — HYDROCODONE BITARTRATE AND ACETAMINOPHEN 5; 325 MG/1; MG/1
1 TABLET ORAL EVERY 12 HOURS PRN
Qty: 60 TABLET | Refills: 0 | Status: SHIPPED | OUTPATIENT
Start: 2022-04-07 | End: 2022-05-06 | Stop reason: SDUPTHER

## 2022-04-07 RX ORDER — CYCLOBENZAPRINE HCL 10 MG
10 TABLET ORAL 3 TIMES DAILY PRN
Qty: 90 TABLET | Refills: 0 | Status: SHIPPED | OUTPATIENT
Start: 2022-04-07 | End: 2022-04-08 | Stop reason: SDUPTHER

## 2022-04-07 NOTE — TELEPHONE ENCOUNTER
----- Message from Emily Shah sent at 4/7/2022 10:42 AM CDT -----  Patient called and stated that she went to the pharmacy yesterday and her hydrocodone-acetaminophen and her cyclobenzaprine was not called in please give her a call at [phone]

## 2022-04-08 RX ORDER — AMLODIPINE BESYLATE 5 MG/1
5 TABLET ORAL DAILY
Qty: 90 TABLET | Refills: 1 | Status: SHIPPED | OUTPATIENT
Start: 2022-04-08 | End: 2022-07-06 | Stop reason: SDUPTHER

## 2022-04-08 RX ORDER — PANTOPRAZOLE SODIUM 40 MG/1
40 TABLET, DELAYED RELEASE ORAL DAILY
Qty: 90 TABLET | Refills: 1 | Status: SHIPPED | OUTPATIENT
Start: 2022-04-08 | End: 2022-07-06 | Stop reason: SDUPTHER

## 2022-04-08 RX ORDER — APIXABAN 5 MG/1
5 TABLET, FILM COATED ORAL 2 TIMES DAILY
Qty: 180 TABLET | Refills: 1 | Status: SHIPPED | OUTPATIENT
Start: 2022-04-08 | End: 2022-11-26 | Stop reason: SDUPTHER

## 2022-04-08 RX ORDER — CYCLOBENZAPRINE HCL 10 MG
10 TABLET ORAL 3 TIMES DAILY PRN
Qty: 90 TABLET | Refills: 0 | Status: SHIPPED | OUTPATIENT
Start: 2022-04-08 | End: 2022-06-19 | Stop reason: SDUPTHER

## 2022-04-08 RX ORDER — PRAVASTATIN SODIUM 40 MG/1
40 TABLET ORAL DAILY
Qty: 90 TABLET | Refills: 0 | Status: SHIPPED | OUTPATIENT
Start: 2022-04-08 | End: 2022-07-06 | Stop reason: SDUPTHER

## 2022-04-12 NOTE — PROGRESS NOTES
SUBJECTIVE:    Patient ID: Jessica Mitchell is a 61 y.o. female.    Chief Complaint: Follow-up (Brought bottles // need refills // up coming Colonoscopy // abc)    61 year old female presents for check up. She is treated for anxiety , hypertension, hyperlipidemia, gerd, constipation, arthritis. Takes pain meds for daily back pain. This is effective in relieving pain. Works cleaning houses daily.  Blood pressure in office today is well controlled. Last labs were in January. Due for mammogram. Sleeping ok.       No visits with results within 6 Month(s) from this visit.   Latest known visit with results is:   Office Visit on 09/27/2021   Component Date Value Ref Range Status    WBC 01/06/2022 7.1  3.8 - 10.8 Thousand/uL Final    RBC 01/06/2022 5.42 (A) 3.80 - 5.10 Million/uL Final    Hemoglobin 01/06/2022 15.5  11.7 - 15.5 g/dL Final    Hematocrit 01/06/2022 46.4 (A) 35.0 - 45.0 % Final    MCV 01/06/2022 85.6  80.0 - 100.0 fL Final    MCH 01/06/2022 28.6  27.0 - 33.0 pg Final    MCHC 01/06/2022 33.4  32.0 - 36.0 g/dL Final    RDW 01/06/2022 12.0  11.0 - 15.0 % Final    Platelets 01/06/2022 314  140 - 400 Thousand/uL Final    MPV 01/06/2022 9.5  7.5 - 12.5 fL Final    Neutrophils, Abs 01/06/2022 3,898  1,500 - 7,800 cells/uL Final    Lymph # 01/06/2022 2,513  850 - 3,900 cells/uL Final    Mono # 01/06/2022 462  200 - 950 cells/uL Final    Eos # 01/06/2022 156  15 - 500 cells/uL Final    Baso # 01/06/2022 71  0 - 200 cells/uL Final    Neutrophils Relative 01/06/2022 54.9  % Final    Lymph % 01/06/2022 35.4  % Final    Mono % 01/06/2022 6.5  % Final    Eosinophil % 01/06/2022 2.2  % Final    Basophil % 01/06/2022 1.0  % Final    Glucose 01/06/2022 84  65 - 99 mg/dL Final    BUN 01/06/2022 13  7 - 25 mg/dL Final    Creatinine 01/06/2022 0.81  0.50 - 0.99 mg/dL Final    eGFR if non African American 01/06/2022 79  > OR = 60 mL/min/1.73m2 Final    eGFR if  01/06/2022 91  > OR = 60  mL/min/1.73m2 Final    BUN/Creatinine Ratio 01/06/2022 NOT APPLICABLE  6 - 22 (calc) Final    Sodium 01/06/2022 141  135 - 146 mmol/L Final    Potassium 01/06/2022 4.2  3.5 - 5.3 mmol/L Final    Chloride 01/06/2022 105  98 - 110 mmol/L Final    CO2 01/06/2022 27  20 - 32 mmol/L Final    Calcium 01/06/2022 9.1  8.6 - 10.4 mg/dL Final    Total Protein 01/06/2022 7.3  6.1 - 8.1 g/dL Final    Albumin 01/06/2022 4.2  3.6 - 5.1 g/dL Final    Globulin, Total 01/06/2022 3.1  1.9 - 3.7 g/dL (calc) Final    Albumin/Globulin Ratio 01/06/2022 1.4  1.0 - 2.5 (calc) Final    Total Bilirubin 01/06/2022 0.4  0.2 - 1.2 mg/dL Final    Alkaline Phosphatase 01/06/2022 97  37 - 153 U/L Final    AST 01/06/2022 21  10 - 35 U/L Final    ALT 01/06/2022 22  6 - 29 U/L Final    Cholesterol 01/06/2022 226 (A) <200 mg/dL Final    HDL 01/06/2022 55  > OR = 50 mg/dL Final    Triglycerides 01/06/2022 132  <150 mg/dL Final    LDL Cholesterol 01/06/2022 145 (A) mg/dL (calc) Final    HDL/Cholesterol Ratio 01/06/2022 4.1  <5.0 (calc) Final    Non HDL Chol. (LDL+VLDL) 01/06/2022 171 (A) <130 mg/dL (calc) Final    TSH w/reflex to FT4 01/06/2022 1.40  0.40 - 4.50 mIU/L Final    Color, UA 01/06/2022 DARK YELLOW  YELLOW Final    Appearance, UA 01/06/2022 CLEAR  CLEAR Final    Specific [place], UA 01/06/2022 1.024  1.001 - 1.035 Final    pH, UA 01/06/2022 5.5  5.0 - 8.0 Final    Glucose, UA 01/06/2022 NEGATIVE  NEGATIVE Final    Bilirubin, UA 01/06/2022 NEGATIVE  NEGATIVE Final    Ketones, UA 01/06/2022 NEGATIVE  NEGATIVE Final    Occult Blood UA 01/06/2022 NEGATIVE  NEGATIVE Final    Protein, UA 01/06/2022 NEGATIVE  NEGATIVE Final    Nitrite, UA 01/06/2022 NEGATIVE  NEGATIVE Final    Leukocytes, UA 01/06/2022 TRACE (A) NEGATIVE Final    WBC Casts, UA 01/06/2022 0-5  < OR = 5 /HPF Final    RBC Casts, UA 01/06/2022 0-2  < OR = 2 /HPF Final    Squam Epithel, UA 01/06/2022 6-10 (A) < OR = 5 /HPF Final    Bacteria, UA  01/06/2022 NONE SEEN  NONE SEEN /HPF Final    Hyaline Casts, UA 01/06/2022 NONE SEEN  NONE SEEN /LPF Final    Reflexive Urine Culture 01/06/2022    Final    Urine Culture, Routine 01/06/2022    Final       Past Medical History:   Diagnosis Date    Adrenal tumor     DVT (deep venous thrombosis) 2012    Hiatal hernia     HTN (hypertension)     Multiple thyroid nodules     two    Stomach ulcer     Stroke     at 31 years old     Social History     Socioeconomic History    Marital status:    Tobacco Use    Smoking status: Former Smoker    Smokeless tobacco: Never Used   Substance and Sexual Activity    Alcohol use: Yes     Alcohol/week: 0.0 standard drinks     Comment: socially    Drug use: No     Past Surgical History:   Procedure Laterality Date    Bilatweral Tubal ligation      ESOPHAGEAL DILATION       History reviewed. No pertinent family history.    Review of patient's allergies indicates:   Allergen Reactions    Codeine Itching    Codeine sulfate      Other reaction(s): Unknown    Lisinopril Other (See Comments)     cough  Other reaction(s): Unknown       Current Outpatient Medications:     docusate sodium (COLACE) 100 MG capsule, Take 1 capsule (100 mg total) by mouth 2 (two) times daily., Disp: 60 capsule, Rfl: 3    DULoxetine (CYMBALTA) 30 MG capsule, Take 1 capsule (30 mg total) by mouth once daily., Disp: 30 capsule, Rfl: 5    linaCLOtide (LINZESS) 145 mcg Cap capsule, Take 145 mcg by mouth once daily., Disp: , Rfl:     amLODIPine (NORVASC) 5 MG tablet, Take 1 tablet (5 mg total) by mouth once daily., Disp: 90 tablet, Rfl: 1    cyclobenzaprine (FLEXERIL) 10 MG tablet, Take 1 tablet (10 mg total) by mouth 3 (three) times daily as needed for Muscle spasms., Disp: 90 tablet, Rfl: 0    ELIQUIS 5 mg Tab, Take 1 tablet (5 mg total) by mouth 2 (two) times daily., Disp: 180 tablet, Rfl: 1    HYDROcodone-acetaminophen (NORCO) 5-325 mg per tablet, Take 1 tablet by mouth every 12  "(twelve) hours as needed for Pain., Disp: 60 tablet, Rfl: 0    pantoprazole (PROTONIX) 40 MG tablet, Take 1 tablet (40 mg total) by mouth once daily., Disp: 90 tablet, Rfl: 1    pravastatin (PRAVACHOL) 40 MG tablet, Take 1 tablet (40 mg total) by mouth once daily., Disp: 90 tablet, Rfl: 0    Review of Systems   Constitutional: Negative for chills, fever and unexpected weight change.   HENT: Negative for ear pain, rhinorrhea and sore throat.    Eyes: Negative for pain and visual disturbance.   Respiratory: Negative for cough and shortness of breath.    Cardiovascular: Negative for chest pain, palpitations and leg swelling.   Gastrointestinal: Negative for abdominal pain, diarrhea, nausea and vomiting.   Genitourinary: Negative for difficulty urinating, hematuria and vaginal bleeding.   Musculoskeletal: Negative for arthralgias.        Back pain   Skin: Negative for rash.   Neurological: Negative for dizziness, weakness and headaches.   Psychiatric/Behavioral: Negative for agitation and sleep disturbance. The patient is not nervous/anxious.           Objective:      Vitals:    04/06/22 0939   BP: 120/74   Pulse: 80   Weight: 93.9 kg (207 lb)   Height: 5' 3.5" (1.613 m)     Physical Exam  Vitals and nursing note reviewed.   Constitutional:       Appearance: She is well-developed.   HENT:      Head: Normocephalic.      Left Ear: External ear normal.   Neck:      Vascular: No JVD.   Cardiovascular:      Rate and Rhythm: Normal rate and regular rhythm.      Heart sounds: No murmur heard.  Pulmonary:      Effort: Pulmonary effort is normal.      Breath sounds: Normal breath sounds.   Abdominal:      General: Bowel sounds are normal.      Palpations: Abdomen is soft.   Musculoskeletal:      Left wrist: No swelling, deformity or snuff box tenderness. Decreased range of motion.      Cervical back: Normal range of motion and neck supple.      Lumbar back: Tenderness present. Negative right straight leg raise test and " negative left straight leg raise test.   Lymphadenopathy:      Cervical: No cervical adenopathy.   Skin:     General: Skin is warm and dry.      Findings: No rash.   Neurological:      Mental Status: She is alert and oriented to person, place, and time.      Gait: Gait normal.   Psychiatric:         Speech: Speech normal.         Behavior: Behavior normal.           Assessment:       1. Gastroesophageal reflux disease, unspecified whether esophagitis present    2. Low back pain, unspecified back pain laterality, unspecified chronicity, unspecified whether sciatica present    3. Hypertension, unspecified type    4. Mixed hyperlipidemia Well controlled   5. Coagulation disorder    6. Anxiety disorder, unspecified type    7. Hyperlipidemia, unspecified hyperlipidemia type    8. High risk medication use    9. Constipation, unspecified constipation type         Plan:       Gastroesophageal reflux disease, unspecified whether esophagitis present    Low back pain, unspecified back pain laterality, unspecified chronicity, unspecified whether sciatica present  -     cyclobenzaprine (FLEXERIL) 10 MG tablet; Take 1 tablet (10 mg total) by mouth 3 (three) times daily as needed for Muscle spasms.  Dispense: 90 tablet; Refill: 0    Hypertension, unspecified type  -     amLODIPine (NORVASC) 5 MG tablet; Take 1 tablet (5 mg total) by mouth once daily.  Dispense: 90 tablet; Refill: 1    Mixed hyperlipidemia  -     pravastatin (PRAVACHOL) 40 MG tablet; Take 1 tablet (40 mg total) by mouth once daily.  Dispense: 90 tablet; Refill: 0    Coagulation disorder  -     ELIQUIS 5 mg Tab; Take 1 tablet (5 mg total) by mouth 2 (two) times daily.  Dispense: 180 tablet; Refill: 1    Anxiety disorder, unspecified type    Hyperlipidemia, unspecified hyperlipidemia type    High risk medication use    Constipation, unspecified constipation type    Other orders  -     pantoprazole (PROTONIX) 40 MG tablet; Take 1 tablet (40 mg total) by mouth once  daily.  Dispense: 90 tablet; Refill: 1      Follow up in about 3 months (around 7/6/2022), or if symptoms worsen or fail to improve, for medication management.        4/12/2022 Angi Barrett

## 2022-05-06 RX ORDER — HYDROCODONE BITARTRATE AND ACETAMINOPHEN 5; 325 MG/1; MG/1
1 TABLET ORAL EVERY 12 HOURS PRN
Qty: 60 TABLET | Refills: 0 | Status: SHIPPED | OUTPATIENT
Start: 2022-05-08 | End: 2022-06-09 | Stop reason: SDUPTHER

## 2022-05-15 ENCOUNTER — PATIENT MESSAGE (OUTPATIENT)
Dept: FAMILY MEDICINE | Facility: CLINIC | Age: 62
End: 2022-05-15

## 2022-05-31 ENCOUNTER — PATIENT MESSAGE (OUTPATIENT)
Dept: FAMILY MEDICINE | Facility: CLINIC | Age: 62
End: 2022-05-31

## 2022-06-10 RX ORDER — HYDROCODONE BITARTRATE AND ACETAMINOPHEN 5; 325 MG/1; MG/1
1 TABLET ORAL EVERY 12 HOURS PRN
Qty: 60 TABLET | Refills: 0 | Status: SHIPPED | OUTPATIENT
Start: 2022-06-10 | End: 2022-07-06 | Stop reason: SDUPTHER

## 2022-07-06 ENCOUNTER — OFFICE VISIT (OUTPATIENT)
Dept: FAMILY MEDICINE | Facility: CLINIC | Age: 62
End: 2022-07-06
Payer: COMMERCIAL

## 2022-07-06 VITALS
HEART RATE: 84 BPM | HEIGHT: 64 IN | WEIGHT: 208 LBS | SYSTOLIC BLOOD PRESSURE: 132 MMHG | BODY MASS INDEX: 35.51 KG/M2 | DIASTOLIC BLOOD PRESSURE: 84 MMHG

## 2022-07-06 DIAGNOSIS — Z79.01 LONG TERM (CURRENT) USE OF ANTICOAGULANTS: ICD-10-CM

## 2022-07-06 DIAGNOSIS — Z79.899 HIGH RISK MEDICATION USE: ICD-10-CM

## 2022-07-06 DIAGNOSIS — E66.9 OBESITY (BMI 30.0-34.9): ICD-10-CM

## 2022-07-06 DIAGNOSIS — F41.9 ANXIETY DISORDER, UNSPECIFIED TYPE: ICD-10-CM

## 2022-07-06 DIAGNOSIS — I10 HYPERTENSION, UNSPECIFIED TYPE: ICD-10-CM

## 2022-07-06 DIAGNOSIS — M54.50 LOW BACK PAIN, UNSPECIFIED BACK PAIN LATERALITY, UNSPECIFIED CHRONICITY, UNSPECIFIED WHETHER SCIATICA PRESENT: ICD-10-CM

## 2022-07-06 DIAGNOSIS — E78.2 MIXED HYPERLIPIDEMIA: ICD-10-CM

## 2022-07-06 DIAGNOSIS — K21.9 GASTROESOPHAGEAL REFLUX DISEASE, UNSPECIFIED WHETHER ESOPHAGITIS PRESENT: Primary | ICD-10-CM

## 2022-07-06 PROCEDURE — 3075F PR MOST RECENT SYSTOLIC BLOOD PRESS GE 130-139MM HG: ICD-10-PCS | Mod: CPTII,S$GLB,, | Performed by: NURSE PRACTITIONER

## 2022-07-06 PROCEDURE — 99214 PR OFFICE/OUTPT VISIT, EST, LEVL IV, 30-39 MIN: ICD-10-PCS | Mod: S$GLB,,, | Performed by: NURSE PRACTITIONER

## 2022-07-06 PROCEDURE — 3079F DIAST BP 80-89 MM HG: CPT | Mod: CPTII,S$GLB,, | Performed by: NURSE PRACTITIONER

## 2022-07-06 PROCEDURE — 3079F PR MOST RECENT DIASTOLIC BLOOD PRESSURE 80-89 MM HG: ICD-10-PCS | Mod: CPTII,S$GLB,, | Performed by: NURSE PRACTITIONER

## 2022-07-06 PROCEDURE — 3008F PR BODY MASS INDEX (BMI) DOCUMENTED: ICD-10-PCS | Mod: CPTII,S$GLB,, | Performed by: NURSE PRACTITIONER

## 2022-07-06 PROCEDURE — 1159F PR MEDICATION LIST DOCUMENTED IN MEDICAL RECORD: ICD-10-PCS | Mod: CPTII,S$GLB,, | Performed by: NURSE PRACTITIONER

## 2022-07-06 PROCEDURE — 3075F SYST BP GE 130 - 139MM HG: CPT | Mod: CPTII,S$GLB,, | Performed by: NURSE PRACTITIONER

## 2022-07-06 PROCEDURE — 1160F PR REVIEW ALL MEDS BY PRESCRIBER/CLIN PHARMACIST DOCUMENTED: ICD-10-PCS | Mod: CPTII,S$GLB,, | Performed by: NURSE PRACTITIONER

## 2022-07-06 PROCEDURE — 1159F MED LIST DOCD IN RCRD: CPT | Mod: CPTII,S$GLB,, | Performed by: NURSE PRACTITIONER

## 2022-07-06 PROCEDURE — 3008F BODY MASS INDEX DOCD: CPT | Mod: CPTII,S$GLB,, | Performed by: NURSE PRACTITIONER

## 2022-07-06 PROCEDURE — 1160F RVW MEDS BY RX/DR IN RCRD: CPT | Mod: CPTII,S$GLB,, | Performed by: NURSE PRACTITIONER

## 2022-07-06 PROCEDURE — 99214 OFFICE O/P EST MOD 30 MIN: CPT | Mod: S$GLB,,, | Performed by: NURSE PRACTITIONER

## 2022-07-06 RX ORDER — HYDROCODONE BITARTRATE AND ACETAMINOPHEN 5; 325 MG/1; MG/1
1 TABLET ORAL EVERY 12 HOURS PRN
Qty: 60 TABLET | Refills: 0 | Status: SHIPPED | OUTPATIENT
Start: 2022-08-09 | End: 2022-09-27 | Stop reason: SDUPTHER

## 2022-07-06 RX ORDER — PANTOPRAZOLE SODIUM 40 MG/1
40 TABLET, DELAYED RELEASE ORAL DAILY
Qty: 90 TABLET | Refills: 1 | Status: SHIPPED | OUTPATIENT
Start: 2022-07-06 | End: 2022-09-27 | Stop reason: SDUPTHER

## 2022-07-06 RX ORDER — AMLODIPINE BESYLATE 5 MG/1
5 TABLET ORAL DAILY
Qty: 90 TABLET | Refills: 1 | Status: SHIPPED | OUTPATIENT
Start: 2022-07-06 | End: 2022-09-27 | Stop reason: SDUPTHER

## 2022-07-06 RX ORDER — CYCLOBENZAPRINE HCL 10 MG
10 TABLET ORAL 3 TIMES DAILY PRN
Qty: 90 TABLET | Refills: 0 | Status: SHIPPED | OUTPATIENT
Start: 2022-07-06 | End: 2022-08-28 | Stop reason: SDUPTHER

## 2022-07-06 RX ORDER — HYDROCODONE BITARTRATE AND ACETAMINOPHEN 5; 325 MG/1; MG/1
1 TABLET ORAL EVERY 12 HOURS PRN
Qty: 60 TABLET | Refills: 0 | Status: SHIPPED | OUTPATIENT
Start: 2022-09-08 | End: 2022-09-27 | Stop reason: SDUPTHER

## 2022-07-06 RX ORDER — HYDROCODONE BITARTRATE AND ACETAMINOPHEN 5; 325 MG/1; MG/1
1 TABLET ORAL EVERY 12 HOURS PRN
Qty: 60 TABLET | Refills: 0 | Status: SHIPPED | OUTPATIENT
Start: 2022-07-10 | End: 2022-09-27 | Stop reason: SDUPTHER

## 2022-07-06 RX ORDER — PHENTERMINE HYDROCHLORIDE 37.5 MG/1
37.5 TABLET ORAL
Qty: 30 TABLET | Refills: 0 | Status: SHIPPED | OUTPATIENT
Start: 2022-07-06 | End: 2022-08-05

## 2022-07-06 RX ORDER — PRAVASTATIN SODIUM 40 MG/1
40 TABLET ORAL DAILY
Qty: 90 TABLET | Refills: 0 | Status: SHIPPED | OUTPATIENT
Start: 2022-07-06 | End: 2022-09-27 | Stop reason: SDUPTHER

## 2022-07-06 NOTE — PROGRESS NOTES
SUBJECTIVE:    Patient ID: Jessica Mitchell is a 61 y.o. female.    Chief Complaint: Hypertension (Brought bottles//needs refills and declines screeners//bs) and Hyperlipidemia    61 year old female presents for check up. She is treated for anxiety , hypertension, hyperlipidemia, gerd, constipation, arthritis. Takes pain meds for daily back pain. This is effective in relieving pain. Works cleaning houses daily.  Takes norco for back pain. Alleviates the pain for the most partBlood pressure in office today is well controlled. Last labs were in January. Patient is feeling down due to weight gain. Reports being a stress eater. Snacks at night.       No visits with results within 6 Month(s) from this visit.   Latest known visit with results is:   Office Visit on 09/27/2021   Component Date Value Ref Range Status    WBC 01/06/2022 7.1  3.8 - 10.8 Thousand/uL Final    RBC 01/06/2022 5.42 (A) 3.80 - 5.10 Million/uL Final    Hemoglobin 01/06/2022 15.5  11.7 - 15.5 g/dL Final    Hematocrit 01/06/2022 46.4 (A) 35.0 - 45.0 % Final    MCV 01/06/2022 85.6  80.0 - 100.0 fL Final    MCH 01/06/2022 28.6  27.0 - 33.0 pg Final    MCHC 01/06/2022 33.4  32.0 - 36.0 g/dL Final    RDW 01/06/2022 12.0  11.0 - 15.0 % Final    Platelets 01/06/2022 314  140 - 400 Thousand/uL Final    MPV 01/06/2022 9.5  7.5 - 12.5 fL Final    Neutrophils, Abs 01/06/2022 3,898  1,500 - 7,800 cells/uL Final    Lymph # 01/06/2022 2,513  850 - 3,900 cells/uL Final    Mono # 01/06/2022 462  200 - 950 cells/uL Final    Eos # 01/06/2022 156  15 - 500 cells/uL Final    Baso # 01/06/2022 71  0 - 200 cells/uL Final    Neutrophils Relative 01/06/2022 54.9  % Final    Lymph % 01/06/2022 35.4  % Final    Mono % 01/06/2022 6.5  % Final    Eosinophil % 01/06/2022 2.2  % Final    Basophil % 01/06/2022 1.0  % Final    Glucose 01/06/2022 84  65 - 99 mg/dL Final    BUN 01/06/2022 13  7 - 25 mg/dL Final    Creatinine 01/06/2022 0.81  0.50 - 0.99 mg/dL  Final    eGFR if non African American 01/06/2022 79  > OR = 60 mL/min/1.73m2 Final    eGFR if  01/06/2022 91  > OR = 60 mL/min/1.73m2 Final    BUN/Creatinine Ratio 01/06/2022 NOT APPLICABLE  6 - 22 (calc) Final    Sodium 01/06/2022 141  135 - 146 mmol/L Final    Potassium 01/06/2022 4.2  3.5 - 5.3 mmol/L Final    Chloride 01/06/2022 105  98 - 110 mmol/L Final    CO2 01/06/2022 27  20 - 32 mmol/L Final    Calcium 01/06/2022 9.1  8.6 - 10.4 mg/dL Final    Total Protein 01/06/2022 7.3  6.1 - 8.1 g/dL Final    Albumin 01/06/2022 4.2  3.6 - 5.1 g/dL Final    Globulin, Total 01/06/2022 3.1  1.9 - 3.7 g/dL (calc) Final    Albumin/Globulin Ratio 01/06/2022 1.4  1.0 - 2.5 (calc) Final    Total Bilirubin 01/06/2022 0.4  0.2 - 1.2 mg/dL Final    Alkaline Phosphatase 01/06/2022 97  37 - 153 U/L Final    AST 01/06/2022 21  10 - 35 U/L Final    ALT 01/06/2022 22  6 - 29 U/L Final    Cholesterol 01/06/2022 226 (A) <200 mg/dL Final    HDL 01/06/2022 55  > OR = 50 mg/dL Final    Triglycerides 01/06/2022 132  <150 mg/dL Final    LDL Cholesterol 01/06/2022 145 (A) mg/dL (calc) Final    HDL/Cholesterol Ratio 01/06/2022 4.1  <5.0 (calc) Final    Non HDL Chol. (LDL+VLDL) 01/06/2022 171 (A) <130 mg/dL (calc) Final    TSH w/reflex to FT4 01/06/2022 1.40  0.40 - 4.50 mIU/L Final    Color, UA 01/06/2022 DARK YELLOW  YELLOW Final    Appearance, UA 01/06/2022 CLEAR  CLEAR Final    Specific [place], UA 01/06/2022 1.024  1.001 - 1.035 Final    pH, UA 01/06/2022 5.5  5.0 - 8.0 Final    Glucose, UA 01/06/2022 NEGATIVE  NEGATIVE Final    Bilirubin, UA 01/06/2022 NEGATIVE  NEGATIVE Final    Ketones, UA 01/06/2022 NEGATIVE  NEGATIVE Final    Occult Blood UA 01/06/2022 NEGATIVE  NEGATIVE Final    Protein, UA 01/06/2022 NEGATIVE  NEGATIVE Final    Nitrite, UA 01/06/2022 NEGATIVE  NEGATIVE Final    Leukocytes, UA 01/06/2022 TRACE (A) NEGATIVE Final    WBC Casts, UA 01/06/2022 0-5  < OR = 5 /HPF Final     RBC Casts, UA 01/06/2022 0-2  < OR = 2 /HPF Final    Squam Epithel, UA 01/06/2022 6-10 (A) < OR = 5 /HPF Final    Bacteria, UA 01/06/2022 NONE SEEN  NONE SEEN /HPF Final    Hyaline Casts, UA 01/06/2022 NONE SEEN  NONE SEEN /LPF Final    Reflexive Urine Culture 01/06/2022    Final    Urine Culture, Routine 01/06/2022    Final       Past Medical History:   Diagnosis Date    Adrenal tumor     DVT (deep venous thrombosis) 2012    Hiatal hernia     HTN (hypertension)     Multiple thyroid nodules     two    Stomach ulcer     Stroke     at 31 years old     Social History     Socioeconomic History    Marital status:    Tobacco Use    Smoking status: Former Smoker    Smokeless tobacco: Never Used   Substance and Sexual Activity    Alcohol use: Yes     Alcohol/week: 0.0 standard drinks     Comment: socially    Drug use: No     Past Surgical History:   Procedure Laterality Date    Bilatweral Tubal ligation      ESOPHAGEAL DILATION       History reviewed. No pertinent family history.    Review of patient's allergies indicates:   Allergen Reactions    Codeine Itching    Codeine sulfate      Other reaction(s): Unknown    Lisinopril Other (See Comments)     cough  Other reaction(s): Unknown       Current Outpatient Medications:     amLODIPine (NORVASC) 5 MG tablet, Take 1 tablet (5 mg total) by mouth once daily., Disp: 90 tablet, Rfl: 1    cyclobenzaprine (FLEXERIL) 10 MG tablet, Take 1 tablet (10 mg total) by mouth 3 (three) times daily as needed for Muscle spasms., Disp: 90 tablet, Rfl: 0    ELIQUIS 5 mg Tab, Take 1 tablet (5 mg total) by mouth 2 (two) times daily., Disp: 180 tablet, Rfl: 1    HYDROcodone-acetaminophen (NORCO) 5-325 mg per tablet, Take 1 tablet by mouth every 12 (twelve) hours as needed for Pain., Disp: 60 tablet, Rfl: 0    [START ON 8/9/2022] HYDROcodone-acetaminophen (NORCO) 5-325 mg per tablet, Take 1 tablet by mouth every 12 (twelve) hours as needed for Pain., Disp: 60  "tablet, Rfl: 0    [START ON 9/8/2022] HYDROcodone-acetaminophen (NORCO) 5-325 mg per tablet, Take 1 tablet by mouth every 12 (twelve) hours as needed for Pain., Disp: 60 tablet, Rfl: 0    linaCLOtide (LINZESS) 145 mcg Cap capsule, Take 145 mcg by mouth once daily., Disp: , Rfl:     pantoprazole (PROTONIX) 40 MG tablet, Take 1 tablet (40 mg total) by mouth once daily., Disp: 90 tablet, Rfl: 1    phentermine (ADIPEX-P) 37.5 mg tablet, Take 1 tablet (37.5 mg total) by mouth before breakfast., Disp: 30 tablet, Rfl: 0    pravastatin (PRAVACHOL) 40 MG tablet, Take 1 tablet (40 mg total) by mouth once daily., Disp: 90 tablet, Rfl: 0    Review of Systems   Constitutional: Negative for chills, fever and unexpected weight change.   HENT: Negative for ear pain, rhinorrhea and sore throat.    Eyes: Negative for pain and visual disturbance.   Respiratory: Negative for cough and shortness of breath.    Cardiovascular: Negative for chest pain, palpitations and leg swelling.   Gastrointestinal: Negative for abdominal pain, diarrhea, nausea and vomiting.   Genitourinary: Negative for difficulty urinating, hematuria and vaginal bleeding.   Musculoskeletal: Positive for back pain. Negative for arthralgias.   Skin: Negative for rash.   Neurological: Negative for dizziness, weakness and headaches.   Psychiatric/Behavioral: Negative for agitation and sleep disturbance. The patient is not nervous/anxious.           Objective:      Vitals:    07/06/22 1048   BP: 132/84   Pulse: 84   Weight: 94.3 kg (208 lb)   Height: 5' 3.5" (1.613 m)     Physical Exam  Vitals and nursing note reviewed.   Constitutional:       General: She is not in acute distress.     Appearance: Normal appearance. She is well-developed. She is obese.   HENT:      Right Ear: External ear normal.      Left Ear: External ear normal.   Eyes:      Conjunctiva/sclera: Conjunctivae normal.      Pupils: Pupils are equal, round, and reactive to light.   Neck:      Vascular: " No JVD.   Cardiovascular:      Rate and Rhythm: Normal rate and regular rhythm.      Heart sounds: No murmur heard.  Pulmonary:      Effort: Pulmonary effort is normal.      Breath sounds: Normal breath sounds.   Abdominal:      General: Bowel sounds are normal.      Palpations: Abdomen is soft.   Musculoskeletal:         General: No deformity. Normal range of motion.      Cervical back: Normal range of motion and neck supple.   Lymphadenopathy:      Cervical: No cervical adenopathy.   Skin:     General: Skin is warm and dry.      Findings: No rash.   Neurological:      Mental Status: She is alert and oriented to person, place, and time.      Gait: Gait normal.   Psychiatric:         Speech: Speech normal.         Behavior: Behavior normal.           Assessment:       1. Gastroesophageal reflux disease, unspecified whether esophagitis present    2. Hypertension, unspecified type    3. Low back pain, unspecified back pain laterality, unspecified chronicity, unspecified whether sciatica present    4. Mixed hyperlipidemia Well controlled   5. Obesity (BMI 30.0-34.9)    6. Anxiety disorder, unspecified type    7. High risk medication use    8. Long term (current) use of anticoagulants         Plan:       Gastroesophageal reflux disease, unspecified whether esophagitis present  -     pantoprazole (PROTONIX) 40 MG tablet; Take 1 tablet (40 mg total) by mouth once daily.  Dispense: 90 tablet; Refill: 1    Hypertension, unspecified type  -     amLODIPine (NORVASC) 5 MG tablet; Take 1 tablet (5 mg total) by mouth once daily.  Dispense: 90 tablet; Refill: 1    Low back pain, unspecified back pain laterality, unspecified chronicity, unspecified whether sciatica present  -     cyclobenzaprine (FLEXERIL) 10 MG tablet; Take 1 tablet (10 mg total) by mouth 3 (three) times daily as needed for Muscle spasms.  Dispense: 90 tablet; Refill: 0    Mixed hyperlipidemia  -     pravastatin (PRAVACHOL) 40 MG tablet; Take 1 tablet (40 mg  total) by mouth once daily.  Dispense: 90 tablet; Refill: 0    Obesity (BMI 30.0-34.9)    Anxiety disorder, unspecified type    High risk medication use    Long term (current) use of anticoagulants      Follow up in about 4 weeks (around 8/3/2022) for medication management.        7/15/2022 Angi Barrett

## 2022-07-07 ENCOUNTER — PATIENT MESSAGE (OUTPATIENT)
Dept: FAMILY MEDICINE | Facility: CLINIC | Age: 62
End: 2022-07-07

## 2022-08-02 ENCOUNTER — TELEPHONE (OUTPATIENT)
Dept: FAMILY MEDICINE | Facility: CLINIC | Age: 62
End: 2022-08-02

## 2022-08-02 DIAGNOSIS — Z79.899 ENCOUNTER FOR LONG-TERM (CURRENT) USE OF OTHER MEDICATIONS: ICD-10-CM

## 2022-08-02 DIAGNOSIS — I10 HYPERTENSION, UNSPECIFIED TYPE: Primary | ICD-10-CM

## 2022-08-02 DIAGNOSIS — E78.2 MIXED HYPERLIPIDEMIA: ICD-10-CM

## 2022-08-07 ENCOUNTER — PATIENT MESSAGE (OUTPATIENT)
Dept: FAMILY MEDICINE | Facility: CLINIC | Age: 62
End: 2022-08-07

## 2022-08-08 ENCOUNTER — PATIENT MESSAGE (OUTPATIENT)
Dept: FAMILY MEDICINE | Facility: CLINIC | Age: 62
End: 2022-08-08

## 2022-08-10 RX ORDER — HYDROCODONE BITARTRATE AND ACETAMINOPHEN 5; 325 MG/1; MG/1
1 TABLET ORAL EVERY 12 HOURS PRN
Qty: 60 TABLET | Refills: 0 | Status: CANCELLED | OUTPATIENT
Start: 2022-08-10

## 2022-08-18 ENCOUNTER — PATIENT MESSAGE (OUTPATIENT)
Dept: FAMILY MEDICINE | Facility: CLINIC | Age: 62
End: 2022-08-18

## 2022-08-18 ENCOUNTER — TELEPHONE (OUTPATIENT)
Dept: FAMILY MEDICINE | Facility: CLINIC | Age: 62
End: 2022-08-18

## 2022-08-18 NOTE — TELEPHONE ENCOUNTER
----- Message from Maryse Marshall MA sent at 8/18/2022  8:05 AM CDT -----  Pt has a 9:20 with patricia this morning but has a flat tire and wont be able to make it and would like to reschedule for 8/24 or 8/25. [phone]

## 2022-08-20 ENCOUNTER — PATIENT MESSAGE (OUTPATIENT)
Dept: FAMILY MEDICINE | Facility: CLINIC | Age: 62
End: 2022-08-20

## 2022-08-26 ENCOUNTER — PATIENT MESSAGE (OUTPATIENT)
Dept: FAMILY MEDICINE | Facility: CLINIC | Age: 62
End: 2022-08-26

## 2022-08-29 ENCOUNTER — PATIENT MESSAGE (OUTPATIENT)
Dept: FAMILY MEDICINE | Facility: CLINIC | Age: 62
End: 2022-08-29

## 2022-09-13 ENCOUNTER — PATIENT MESSAGE (OUTPATIENT)
Dept: FAMILY MEDICINE | Facility: CLINIC | Age: 62
End: 2022-09-13

## 2022-09-15 ENCOUNTER — TELEPHONE (OUTPATIENT)
Dept: FAMILY MEDICINE | Facility: CLINIC | Age: 62
End: 2022-09-15

## 2022-09-15 ENCOUNTER — PATIENT MESSAGE (OUTPATIENT)
Dept: FAMILY MEDICINE | Facility: CLINIC | Age: 62
End: 2022-09-15

## 2022-09-27 ENCOUNTER — OFFICE VISIT (OUTPATIENT)
Dept: FAMILY MEDICINE | Facility: CLINIC | Age: 62
End: 2022-09-27
Payer: COMMERCIAL

## 2022-09-27 VITALS
WEIGHT: 200 LBS | HEIGHT: 64 IN | DIASTOLIC BLOOD PRESSURE: 82 MMHG | SYSTOLIC BLOOD PRESSURE: 136 MMHG | HEART RATE: 80 BPM | BODY MASS INDEX: 34.15 KG/M2

## 2022-09-27 DIAGNOSIS — F41.9 ANXIETY DISORDER, UNSPECIFIED TYPE: ICD-10-CM

## 2022-09-27 DIAGNOSIS — Z79.01 LONG TERM (CURRENT) USE OF ANTICOAGULANTS: ICD-10-CM

## 2022-09-27 DIAGNOSIS — E78.2 MIXED HYPERLIPIDEMIA: ICD-10-CM

## 2022-09-27 DIAGNOSIS — Z23 NEED FOR INFLUENZA VACCINATION: Primary | ICD-10-CM

## 2022-09-27 DIAGNOSIS — E78.5 HYPERLIPIDEMIA, UNSPECIFIED HYPERLIPIDEMIA TYPE: ICD-10-CM

## 2022-09-27 DIAGNOSIS — M54.50 LOW BACK PAIN, UNSPECIFIED BACK PAIN LATERALITY, UNSPECIFIED CHRONICITY, UNSPECIFIED WHETHER SCIATICA PRESENT: ICD-10-CM

## 2022-09-27 DIAGNOSIS — E66.9 OBESITY (BMI 30.0-34.9): ICD-10-CM

## 2022-09-27 DIAGNOSIS — Z79.899 HIGH RISK MEDICATION USE: ICD-10-CM

## 2022-09-27 DIAGNOSIS — I10 HYPERTENSION, UNSPECIFIED TYPE: ICD-10-CM

## 2022-09-27 DIAGNOSIS — K21.9 GASTROESOPHAGEAL REFLUX DISEASE, UNSPECIFIED WHETHER ESOPHAGITIS PRESENT: ICD-10-CM

## 2022-09-27 PROCEDURE — 90682 RIV4 VACC RECOMBINANT DNA IM: CPT | Mod: S$GLB,,, | Performed by: NURSE PRACTITIONER

## 2022-09-27 PROCEDURE — 3075F PR MOST RECENT SYSTOLIC BLOOD PRESS GE 130-139MM HG: ICD-10-PCS | Mod: CPTII,S$GLB,, | Performed by: NURSE PRACTITIONER

## 2022-09-27 PROCEDURE — 1160F PR REVIEW ALL MEDS BY PRESCRIBER/CLIN PHARMACIST DOCUMENTED: ICD-10-PCS | Mod: CPTII,S$GLB,, | Performed by: NURSE PRACTITIONER

## 2022-09-27 PROCEDURE — 99214 OFFICE O/P EST MOD 30 MIN: CPT | Mod: 25,S$GLB,, | Performed by: NURSE PRACTITIONER

## 2022-09-27 PROCEDURE — 1159F PR MEDICATION LIST DOCUMENTED IN MEDICAL RECORD: ICD-10-PCS | Mod: CPTII,S$GLB,, | Performed by: NURSE PRACTITIONER

## 2022-09-27 PROCEDURE — 3008F BODY MASS INDEX DOCD: CPT | Mod: CPTII,S$GLB,, | Performed by: NURSE PRACTITIONER

## 2022-09-27 PROCEDURE — 90471 FLU VACCINE - QUADRIVALENT (RECOMBINANT) PRESERVATIVE FREE: ICD-10-PCS | Mod: S$GLB,,, | Performed by: NURSE PRACTITIONER

## 2022-09-27 PROCEDURE — 3075F SYST BP GE 130 - 139MM HG: CPT | Mod: CPTII,S$GLB,, | Performed by: NURSE PRACTITIONER

## 2022-09-27 PROCEDURE — 90471 IMMUNIZATION ADMIN: CPT | Mod: S$GLB,,, | Performed by: NURSE PRACTITIONER

## 2022-09-27 PROCEDURE — 3079F DIAST BP 80-89 MM HG: CPT | Mod: CPTII,S$GLB,, | Performed by: NURSE PRACTITIONER

## 2022-09-27 PROCEDURE — 1160F RVW MEDS BY RX/DR IN RCRD: CPT | Mod: CPTII,S$GLB,, | Performed by: NURSE PRACTITIONER

## 2022-09-27 PROCEDURE — 3079F PR MOST RECENT DIASTOLIC BLOOD PRESSURE 80-89 MM HG: ICD-10-PCS | Mod: CPTII,S$GLB,, | Performed by: NURSE PRACTITIONER

## 2022-09-27 PROCEDURE — 99214 PR OFFICE/OUTPT VISIT, EST, LEVL IV, 30-39 MIN: ICD-10-PCS | Mod: 25,S$GLB,, | Performed by: NURSE PRACTITIONER

## 2022-09-27 PROCEDURE — 1159F MED LIST DOCD IN RCRD: CPT | Mod: CPTII,S$GLB,, | Performed by: NURSE PRACTITIONER

## 2022-09-27 PROCEDURE — 3008F PR BODY MASS INDEX (BMI) DOCUMENTED: ICD-10-PCS | Mod: CPTII,S$GLB,, | Performed by: NURSE PRACTITIONER

## 2022-09-27 PROCEDURE — 90682 FLU VACCINE - QUADRIVALENT (RECOMBINANT) PRESERVATIVE FREE: ICD-10-PCS | Mod: S$GLB,,, | Performed by: NURSE PRACTITIONER

## 2022-09-27 RX ORDER — PANTOPRAZOLE SODIUM 40 MG/1
40 TABLET, DELAYED RELEASE ORAL DAILY
Qty: 90 TABLET | Refills: 1 | Status: SHIPPED | OUTPATIENT
Start: 2022-09-27 | End: 2023-02-01 | Stop reason: SDUPTHER

## 2022-09-27 RX ORDER — HYDROCODONE BITARTRATE AND ACETAMINOPHEN 5; 325 MG/1; MG/1
1 TABLET ORAL EVERY 12 HOURS PRN
Qty: 60 TABLET | Refills: 0 | Status: SHIPPED | OUTPATIENT
Start: 2022-11-11 | End: 2022-12-11

## 2022-09-27 RX ORDER — PHENTERMINE HYDROCHLORIDE 37.5 MG/1
37.5 TABLET ORAL DAILY
Qty: 30 TABLET | Refills: 0 | Status: SHIPPED | OUTPATIENT
Start: 2022-09-27 | End: 2023-05-28

## 2022-09-27 RX ORDER — PRAVASTATIN SODIUM 40 MG/1
40 TABLET ORAL DAILY
Qty: 90 TABLET | Refills: 0 | Status: SHIPPED | OUTPATIENT
Start: 2022-09-27 | End: 2023-01-29 | Stop reason: SDUPTHER

## 2022-09-27 RX ORDER — PHENTERMINE HYDROCHLORIDE 37.5 MG/1
37.5 TABLET ORAL DAILY
COMMUNITY
End: 2022-09-27 | Stop reason: SDUPTHER

## 2022-09-27 RX ORDER — HYDROCODONE BITARTRATE AND ACETAMINOPHEN 5; 325 MG/1; MG/1
1 TABLET ORAL EVERY 12 HOURS PRN
Qty: 60 TABLET | Refills: 0 | Status: SHIPPED | OUTPATIENT
Start: 2022-10-12 | End: 2022-11-11

## 2022-09-27 RX ORDER — CYCLOBENZAPRINE HCL 10 MG
10 TABLET ORAL 3 TIMES DAILY PRN
Qty: 90 TABLET | Refills: 0 | Status: SHIPPED | OUTPATIENT
Start: 2022-09-27 | End: 2023-01-09 | Stop reason: SDUPTHER

## 2022-09-27 RX ORDER — AMLODIPINE BESYLATE 5 MG/1
5 TABLET ORAL DAILY
Qty: 90 TABLET | Refills: 1 | Status: SHIPPED | OUTPATIENT
Start: 2022-09-27 | End: 2023-02-01 | Stop reason: SDUPTHER

## 2022-09-27 RX ORDER — HYDROCODONE BITARTRATE AND ACETAMINOPHEN 5; 325 MG/1; MG/1
1 TABLET ORAL EVERY 12 HOURS PRN
Qty: 60 TABLET | Refills: 0 | Status: SHIPPED | OUTPATIENT
Start: 2022-12-11 | End: 2023-01-09 | Stop reason: SDUPTHER

## 2022-10-08 NOTE — PROGRESS NOTES
SUBJECTIVE:    Patient ID: Jessica Mitchell is a 62 y.o. female.    Chief Complaint: Weight Loss (Brought bottles, Flu wants// SW)    62year old female presents for check up. She is treated for anxiety , hypertension, hyperlipidemia, gerd, constipation, arthritis. T Works cleaning houses daily.  Takes norco for back pain. Alleviates the pain for the most partBlood pressure in office today is well controlled. Last labs were in January. Bp is well controlled.       No visits with results within 6 Month(s) from this visit.   Latest known visit with results is:   Office Visit on 09/27/2021   Component Date Value Ref Range Status    WBC 01/06/2022 7.1  3.8 - 10.8 Thousand/uL Final    RBC 01/06/2022 5.42 (H)  3.80 - 5.10 Million/uL Final    Hemoglobin 01/06/2022 15.5  11.7 - 15.5 g/dL Final    Hematocrit 01/06/2022 46.4 (H)  35.0 - 45.0 % Final    MCV 01/06/2022 85.6  80.0 - 100.0 fL Final    MCH 01/06/2022 28.6  27.0 - 33.0 pg Final    MCHC 01/06/2022 33.4  32.0 - 36.0 g/dL Final    RDW 01/06/2022 12.0  11.0 - 15.0 % Final    Platelets 01/06/2022 314  140 - 400 Thousand/uL Final    MPV 01/06/2022 9.5  7.5 - 12.5 fL Final    Neutrophils, Abs 01/06/2022 3,898  1,500 - 7,800 cells/uL Final    Lymph # 01/06/2022 2,513  850 - 3,900 cells/uL Final    Mono # 01/06/2022 462  200 - 950 cells/uL Final    Eos # 01/06/2022 156  15 - 500 cells/uL Final    Baso # 01/06/2022 71  0 - 200 cells/uL Final    Neutrophils Relative 01/06/2022 54.9  % Final    Lymph % 01/06/2022 35.4  % Final    Mono % 01/06/2022 6.5  % Final    Eosinophil % 01/06/2022 2.2  % Final    Basophil % 01/06/2022 1.0  % Final    Glucose 01/06/2022 84  65 - 99 mg/dL Final    BUN 01/06/2022 13  7 - 25 mg/dL Final    Creatinine 01/06/2022 0.81  0.50 - 0.99 mg/dL Final    eGFR if non African American 01/06/2022 79  > OR = 60 mL/min/1.73m2 Final    eGFR if African American 01/06/2022 91  > OR = 60 mL/min/1.73m2 Final    BUN/Creatinine Ratio 01/06/2022 NOT APPLICABLE  6  - 22 (calc) Final    Sodium 01/06/2022 141  135 - 146 mmol/L Final    Potassium 01/06/2022 4.2  3.5 - 5.3 mmol/L Final    Chloride 01/06/2022 105  98 - 110 mmol/L Final    CO2 01/06/2022 27  20 - 32 mmol/L Final    Calcium 01/06/2022 9.1  8.6 - 10.4 mg/dL Final    Total Protein 01/06/2022 7.3  6.1 - 8.1 g/dL Final    Albumin 01/06/2022 4.2  3.6 - 5.1 g/dL Final    Globulin, Total 01/06/2022 3.1  1.9 - 3.7 g/dL (calc) Final    Albumin/Globulin Ratio 01/06/2022 1.4  1.0 - 2.5 (calc) Final    Total Bilirubin 01/06/2022 0.4  0.2 - 1.2 mg/dL Final    Alkaline Phosphatase 01/06/2022 97  37 - 153 U/L Final    AST 01/06/2022 21  10 - 35 U/L Final    ALT 01/06/2022 22  6 - 29 U/L Final    Cholesterol 01/06/2022 226 (H)  <200 mg/dL Final    HDL 01/06/2022 55  > OR = 50 mg/dL Final    Triglycerides 01/06/2022 132  <150 mg/dL Final    LDL Cholesterol 01/06/2022 145 (H)  mg/dL (calc) Final    HDL/Cholesterol Ratio 01/06/2022 4.1  <5.0 (calc) Final    Non HDL Chol. (LDL+VLDL) 01/06/2022 171 (H)  <130 mg/dL (calc) Final    TSH w/reflex to FT4 01/06/2022 1.40  0.40 - 4.50 mIU/L Final    Color, UA 01/06/2022 DARK YELLOW  YELLOW Final    Appearance, UA 01/06/2022 CLEAR  CLEAR Final    Specific Gravity, UA 01/06/2022 1.024  1.001 - 1.035 Final    pH, UA 01/06/2022 5.5  5.0 - 8.0 Final    Glucose, UA 01/06/2022 NEGATIVE  NEGATIVE Final    Bilirubin, UA 01/06/2022 NEGATIVE  NEGATIVE Final    Ketones, UA 01/06/2022 NEGATIVE  NEGATIVE Final    Occult Blood UA 01/06/2022 NEGATIVE  NEGATIVE Final    Protein, UA 01/06/2022 NEGATIVE  NEGATIVE Final    Nitrite, UA 01/06/2022 NEGATIVE  NEGATIVE Final    Leukocytes, UA 01/06/2022 TRACE (A)  NEGATIVE Final    WBC Casts, UA 01/06/2022 0-5  < OR = 5 /HPF Final    RBC Casts, UA 01/06/2022 0-2  < OR = 2 /HPF Final    Squam Epithel, UA 01/06/2022 6-10 (A)  < OR = 5 /HPF Final    Bacteria, UA 01/06/2022 NONE SEEN  NONE SEEN /HPF Final    Hyaline Casts, UA 01/06/2022 NONE SEEN  NONE SEEN /LPF Final     Reflexive Urine Culture 01/06/2022    Final    Urine Culture, Routine 01/06/2022    Final       Past Medical History:   Diagnosis Date    Adrenal tumor     DVT (deep venous thrombosis) 2012    Hiatal hernia     HTN (hypertension)     Multiple thyroid nodules     two    Stomach ulcer     Stroke     at 31 years old     Social History     Socioeconomic History    Marital status:    Tobacco Use    Smoking status: Former    Smokeless tobacco: Never   Substance and Sexual Activity    Alcohol use: Yes     Alcohol/week: 0.0 standard drinks     Comment: socially    Drug use: No     Past Surgical History:   Procedure Laterality Date    Bilatweral Tubal ligation      ESOPHAGEAL DILATION       History reviewed. No pertinent family history.    Review of patient's allergies indicates:   Allergen Reactions    Codeine Itching    Codeine sulfate      Other reaction(s): Unknown    Lisinopril Other (See Comments)     cough  Other reaction(s): Unknown       Current Outpatient Medications:     ELIQUIS 5 mg Tab, Take 1 tablet (5 mg total) by mouth 2 (two) times daily., Disp: 180 tablet, Rfl: 1    linaCLOtide (LINZESS) 145 mcg Cap capsule, Take 145 mcg by mouth once daily., Disp: , Rfl:     amLODIPine (NORVASC) 5 MG tablet, Take 1 tablet (5 mg total) by mouth once daily., Disp: 90 tablet, Rfl: 1    cyclobenzaprine (FLEXERIL) 10 MG tablet, Take 1 tablet (10 mg total) by mouth 3 (three) times daily as needed for Muscle spasms., Disp: 90 tablet, Rfl: 0    [START ON 10/12/2022] HYDROcodone-acetaminophen (NORCO) 5-325 mg per tablet, Take 1 tablet by mouth every 12 (twelve) hours as needed for Pain., Disp: 60 tablet, Rfl: 0    [START ON 11/11/2022] HYDROcodone-acetaminophen (NORCO) 5-325 mg per tablet, Take 1 tablet by mouth every 12 (twelve) hours as needed for Pain., Disp: 60 tablet, Rfl: 0    [START ON 12/11/2022] HYDROcodone-acetaminophen (NORCO) 5-325 mg per tablet, Take 1 tablet by mouth every 12 (twelve) hours as needed for Pain.,  "Disp: 60 tablet, Rfl: 0    pantoprazole (PROTONIX) 40 MG tablet, Take 1 tablet (40 mg total) by mouth once daily., Disp: 90 tablet, Rfl: 1    phentermine (ADIPEX-P) 37.5 mg tablet, Take 1 tablet (37.5 mg total) by mouth once daily., Disp: 30 tablet, Rfl: 0    pravastatin (PRAVACHOL) 40 MG tablet, Take 1 tablet (40 mg total) by mouth once daily., Disp: 90 tablet, Rfl: 0    Review of Systems   Constitutional:  Negative for chills, fever and unexpected weight change.   HENT:  Negative for ear pain, rhinorrhea and sore throat.    Eyes:  Negative for pain and visual disturbance.   Respiratory:  Negative for cough and shortness of breath.    Cardiovascular:  Negative for chest pain, palpitations and leg swelling.   Gastrointestinal:  Negative for abdominal pain, diarrhea, nausea and vomiting.   Genitourinary:  Negative for difficulty urinating, hematuria and vaginal bleeding.   Musculoskeletal:  Negative for arthralgias.   Skin:  Negative for rash.   Neurological:  Negative for dizziness, weakness and headaches.   Psychiatric/Behavioral:  Negative for agitation and sleep disturbance. The patient is not nervous/anxious.         Objective:      Vitals:    09/27/22 1257   BP: 136/82   Pulse: 80   Weight: 90.7 kg (200 lb)   Height: 5' 3.5" (1.613 m)     Physical Exam  Vitals and nursing note reviewed.   Constitutional:       General: She is not in acute distress.     Appearance: Normal appearance. She is well-developed.   HENT:      Head: Normocephalic.      Right Ear: External ear normal.      Left Ear: External ear normal.   Eyes:      Conjunctiva/sclera: Conjunctivae normal.      Pupils: Pupils are equal, round, and reactive to light.   Neck:      Vascular: No JVD.   Cardiovascular:      Rate and Rhythm: Normal rate and regular rhythm.      Heart sounds: No murmur heard.  Pulmonary:      Effort: Pulmonary effort is normal.      Breath sounds: Normal breath sounds.   Abdominal:      General: Bowel sounds are normal.      " Palpations: Abdomen is soft.   Musculoskeletal:         General: No deformity. Normal range of motion.      Cervical back: Normal range of motion and neck supple.   Lymphadenopathy:      Cervical: No cervical adenopathy.   Skin:     General: Skin is warm and dry.      Findings: No rash.   Neurological:      Mental Status: She is alert and oriented to person, place, and time.      Gait: Gait normal.   Psychiatric:         Speech: Speech normal.         Behavior: Behavior normal.         Assessment:       1. Need for influenza vaccination    2. Low back pain, unspecified back pain laterality, unspecified chronicity, unspecified whether sciatica present    3. Hypertension, unspecified type    4. Mixed hyperlipidemia Well controlled   5. Gastroesophageal reflux disease, unspecified whether esophagitis present    6. Obesity (BMI 30.0-34.9)    7. Long term (current) use of anticoagulants    8. Hyperlipidemia, unspecified hyperlipidemia type    9. Anxiety disorder, unspecified type    10. High risk medication use           Plan:       Need for influenza vaccination  -     Influenza - Quadrivalent (Recombinant) (PF)    Low back pain, unspecified back pain laterality, unspecified chronicity, unspecified whether sciatica present  -     cyclobenzaprine (FLEXERIL) 10 MG tablet; Take 1 tablet (10 mg total) by mouth 3 (three) times daily as needed for Muscle spasms.  Dispense: 90 tablet; Refill: 0    Hypertension, unspecified type  -     amLODIPine (NORVASC) 5 MG tablet; Take 1 tablet (5 mg total) by mouth once daily.  Dispense: 90 tablet; Refill: 1    Mixed hyperlipidemia  -     pravastatin (PRAVACHOL) 40 MG tablet; Take 1 tablet (40 mg total) by mouth once daily.  Dispense: 90 tablet; Refill: 0    Gastroesophageal reflux disease, unspecified whether esophagitis present  -     pantoprazole (PROTONIX) 40 MG tablet; Take 1 tablet (40 mg total) by mouth once daily.  Dispense: 90 tablet; Refill: 1    Obesity (BMI 30.0-34.9)    Long  term (current) use of anticoagulants    Hyperlipidemia, unspecified hyperlipidemia type    Anxiety disorder, unspecified type    High risk medication use    Follow up in about 4 months (around 1/27/2023).        10/8/2022 Angi Barrett

## 2022-10-11 ENCOUNTER — PATIENT MESSAGE (OUTPATIENT)
Dept: FAMILY MEDICINE | Facility: CLINIC | Age: 62
End: 2022-10-11

## 2022-10-24 ENCOUNTER — PATIENT MESSAGE (OUTPATIENT)
Dept: FAMILY MEDICINE | Facility: CLINIC | Age: 62
End: 2022-10-24

## 2022-10-27 ENCOUNTER — PATIENT MESSAGE (OUTPATIENT)
Dept: FAMILY MEDICINE | Facility: CLINIC | Age: 62
End: 2022-10-27

## 2022-10-28 ENCOUNTER — PATIENT MESSAGE (OUTPATIENT)
Dept: FAMILY MEDICINE | Facility: CLINIC | Age: 62
End: 2022-10-28

## 2022-12-01 ENCOUNTER — PATIENT MESSAGE (OUTPATIENT)
Dept: FAMILY MEDICINE | Facility: CLINIC | Age: 62
End: 2022-12-01

## 2023-01-03 ENCOUNTER — TELEPHONE (OUTPATIENT)
Dept: FAMILY MEDICINE | Facility: CLINIC | Age: 63
End: 2023-01-03

## 2023-01-03 NOTE — TELEPHONE ENCOUNTER
----- Message from Maryse Marshall MA sent at 1/3/2023  3:31 PM CST -----  Regarding: Appt request  Pt calling for an appt on 1/11 for medication refills and a check up. [phone]

## 2023-01-03 NOTE — TELEPHONE ENCOUNTER
LMOR for patient to come back - nothing available with Angi on 1/11 - need to see what other days she is available.

## 2023-01-09 DIAGNOSIS — M54.50 LOW BACK PAIN, UNSPECIFIED BACK PAIN LATERALITY, UNSPECIFIED CHRONICITY, UNSPECIFIED WHETHER SCIATICA PRESENT: ICD-10-CM

## 2023-01-09 RX ORDER — CYCLOBENZAPRINE HCL 10 MG
10 TABLET ORAL 3 TIMES DAILY PRN
Qty: 90 TABLET | Refills: 0 | Status: SHIPPED | OUTPATIENT
Start: 2023-01-09 | End: 2023-02-01 | Stop reason: SDUPTHER

## 2023-01-09 RX ORDER — HYDROCODONE BITARTRATE AND ACETAMINOPHEN 5; 325 MG/1; MG/1
1 TABLET ORAL EVERY 12 HOURS PRN
Qty: 60 TABLET | Refills: 0 | Status: SHIPPED | OUTPATIENT
Start: 2023-01-09 | End: 2023-02-01 | Stop reason: SDUPTHER

## 2023-01-29 DIAGNOSIS — E78.5 HYPERLIPIDEMIA, UNSPECIFIED HYPERLIPIDEMIA TYPE: ICD-10-CM

## 2023-01-29 DIAGNOSIS — E78.2 MIXED HYPERLIPIDEMIA: ICD-10-CM

## 2023-01-29 DIAGNOSIS — I10 HYPERTENSION, UNSPECIFIED TYPE: Primary | ICD-10-CM

## 2023-01-29 DIAGNOSIS — Z79.899 HIGH RISK MEDICATION USE: ICD-10-CM

## 2023-01-30 NOTE — TELEPHONE ENCOUNTER
Last ov: 9/27/22 (bottles brought)  Next ov: 2/1/23  Last written per chart: 9/27/22  Last lab draw: 1/6/22

## 2023-01-31 RX ORDER — PRAVASTATIN SODIUM 40 MG/1
40 TABLET ORAL DAILY
Qty: 90 TABLET | Refills: 0 | Status: SHIPPED | OUTPATIENT
Start: 2023-01-31 | End: 2023-02-01 | Stop reason: SDUPTHER

## 2023-02-01 ENCOUNTER — OFFICE VISIT (OUTPATIENT)
Dept: FAMILY MEDICINE | Facility: CLINIC | Age: 63
End: 2023-02-01
Payer: COMMERCIAL

## 2023-02-01 VITALS
SYSTOLIC BLOOD PRESSURE: 136 MMHG | HEIGHT: 64 IN | DIASTOLIC BLOOD PRESSURE: 86 MMHG | BODY MASS INDEX: 33.8 KG/M2 | WEIGHT: 198 LBS | HEART RATE: 72 BPM

## 2023-02-01 DIAGNOSIS — M54.50 LOW BACK PAIN, UNSPECIFIED BACK PAIN LATERALITY, UNSPECIFIED CHRONICITY, UNSPECIFIED WHETHER SCIATICA PRESENT: ICD-10-CM

## 2023-02-01 DIAGNOSIS — Z79.899 HIGH RISK MEDICATION USE: ICD-10-CM

## 2023-02-01 DIAGNOSIS — I10 HYPERTENSION, UNSPECIFIED TYPE: ICD-10-CM

## 2023-02-01 DIAGNOSIS — E78.5 HYPERLIPIDEMIA, UNSPECIFIED HYPERLIPIDEMIA TYPE: ICD-10-CM

## 2023-02-01 DIAGNOSIS — E78.2 MIXED HYPERLIPIDEMIA: ICD-10-CM

## 2023-02-01 DIAGNOSIS — Z79.01 LONG TERM (CURRENT) USE OF ANTICOAGULANTS: Primary | ICD-10-CM

## 2023-02-01 DIAGNOSIS — D68.9 COAGULATION DISORDER: ICD-10-CM

## 2023-02-01 DIAGNOSIS — K21.9 GASTROESOPHAGEAL REFLUX DISEASE, UNSPECIFIED WHETHER ESOPHAGITIS PRESENT: ICD-10-CM

## 2023-02-01 PROCEDURE — 1159F MED LIST DOCD IN RCRD: CPT | Mod: CPTII,S$GLB,, | Performed by: NURSE PRACTITIONER

## 2023-02-01 PROCEDURE — 1159F PR MEDICATION LIST DOCUMENTED IN MEDICAL RECORD: ICD-10-PCS | Mod: CPTII,S$GLB,, | Performed by: NURSE PRACTITIONER

## 2023-02-01 PROCEDURE — 1160F RVW MEDS BY RX/DR IN RCRD: CPT | Mod: CPTII,S$GLB,, | Performed by: NURSE PRACTITIONER

## 2023-02-01 PROCEDURE — 99214 PR OFFICE/OUTPT VISIT, EST, LEVL IV, 30-39 MIN: ICD-10-PCS | Mod: S$GLB,,, | Performed by: NURSE PRACTITIONER

## 2023-02-01 PROCEDURE — 99214 OFFICE O/P EST MOD 30 MIN: CPT | Mod: S$GLB,,, | Performed by: NURSE PRACTITIONER

## 2023-02-01 PROCEDURE — 3008F PR BODY MASS INDEX (BMI) DOCUMENTED: ICD-10-PCS | Mod: CPTII,S$GLB,, | Performed by: NURSE PRACTITIONER

## 2023-02-01 PROCEDURE — 3075F PR MOST RECENT SYSTOLIC BLOOD PRESS GE 130-139MM HG: ICD-10-PCS | Mod: CPTII,S$GLB,, | Performed by: NURSE PRACTITIONER

## 2023-02-01 PROCEDURE — 1160F PR REVIEW ALL MEDS BY PRESCRIBER/CLIN PHARMACIST DOCUMENTED: ICD-10-PCS | Mod: CPTII,S$GLB,, | Performed by: NURSE PRACTITIONER

## 2023-02-01 PROCEDURE — 3075F SYST BP GE 130 - 139MM HG: CPT | Mod: CPTII,S$GLB,, | Performed by: NURSE PRACTITIONER

## 2023-02-01 PROCEDURE — 3079F PR MOST RECENT DIASTOLIC BLOOD PRESSURE 80-89 MM HG: ICD-10-PCS | Mod: CPTII,S$GLB,, | Performed by: NURSE PRACTITIONER

## 2023-02-01 PROCEDURE — 3079F DIAST BP 80-89 MM HG: CPT | Mod: CPTII,S$GLB,, | Performed by: NURSE PRACTITIONER

## 2023-02-01 PROCEDURE — 3008F BODY MASS INDEX DOCD: CPT | Mod: CPTII,S$GLB,, | Performed by: NURSE PRACTITIONER

## 2023-02-01 RX ORDER — PANTOPRAZOLE SODIUM 40 MG/1
40 TABLET, DELAYED RELEASE ORAL DAILY
Qty: 90 TABLET | Refills: 1 | Status: SHIPPED | OUTPATIENT
Start: 2023-02-01 | End: 2023-05-17 | Stop reason: SDUPTHER

## 2023-02-01 RX ORDER — APIXABAN 5 MG/1
5 TABLET, FILM COATED ORAL 2 TIMES DAILY
Qty: 180 TABLET | Refills: 1 | Status: SHIPPED | OUTPATIENT
Start: 2023-02-01 | End: 2023-05-17 | Stop reason: SDUPTHER

## 2023-02-01 RX ORDER — PRAVASTATIN SODIUM 40 MG/1
40 TABLET ORAL DAILY
Qty: 90 TABLET | Refills: 0 | Status: SHIPPED | OUTPATIENT
Start: 2023-02-01 | End: 2023-05-06 | Stop reason: SDUPTHER

## 2023-02-01 RX ORDER — HYDROCODONE BITARTRATE AND ACETAMINOPHEN 5; 325 MG/1; MG/1
1 TABLET ORAL EVERY 12 HOURS PRN
Qty: 60 TABLET | Refills: 0 | Status: SHIPPED | OUTPATIENT
Start: 2023-03-07 | End: 2023-04-06

## 2023-02-01 RX ORDER — HYDROCODONE BITARTRATE AND ACETAMINOPHEN 5; 325 MG/1; MG/1
1 TABLET ORAL EVERY 12 HOURS PRN
Qty: 60 TABLET | Refills: 0 | Status: SHIPPED | OUTPATIENT
Start: 2023-02-08 | End: 2023-03-10

## 2023-02-01 RX ORDER — CYCLOBENZAPRINE HCL 10 MG
10 TABLET ORAL 3 TIMES DAILY PRN
Qty: 90 TABLET | Refills: 0 | Status: SHIPPED | OUTPATIENT
Start: 2023-02-01 | End: 2023-03-20 | Stop reason: SDUPTHER

## 2023-02-01 RX ORDER — AMLODIPINE BESYLATE 5 MG/1
5 TABLET ORAL DAILY
Qty: 90 TABLET | Refills: 1 | Status: SHIPPED | OUTPATIENT
Start: 2023-02-01 | End: 2023-05-06 | Stop reason: SDUPTHER

## 2023-02-01 RX ORDER — HYDROCODONE BITARTRATE AND ACETAMINOPHEN 5; 325 MG/1; MG/1
1 TABLET ORAL EVERY 12 HOURS PRN
Qty: 60 TABLET | Refills: 0 | Status: SHIPPED | OUTPATIENT
Start: 2023-04-07 | End: 2023-05-17 | Stop reason: SDUPTHER

## 2023-02-01 NOTE — PROGRESS NOTES
SUBJECTIVE:    Patient ID: Jessica Mitchell is a 62 y.o. female.    Chief Complaint: Medication Refill (Brought bottles// SW)      62year old female presents for check up. She is treated for anxiety , hypertension, hyperlipidemia, gerd, constipation, arthritis. 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 Bp in office is well controlled. Needs refills. Due for labs, mammogram, and colonoscopy.       No visits with results within 6 Month(s) from this visit.   Latest known visit with results is:   Office Visit on 09/27/2021   Component Date Value Ref Range Status    WBC 01/06/2022 7.1  3.8 - 10.8 Thousand/uL Final    RBC 01/06/2022 5.42 (H)  3.80 - 5.10 Million/uL Final    Hemoglobin 01/06/2022 15.5  11.7 - 15.5 g/dL Final    Hematocrit 01/06/2022 46.4 (H)  35.0 - 45.0 % Final    MCV 01/06/2022 85.6  80.0 - 100.0 fL Final    MCH 01/06/2022 28.6  27.0 - 33.0 pg Final    MCHC 01/06/2022 33.4  32.0 - 36.0 g/dL Final    RDW 01/06/2022 12.0  11.0 - 15.0 % Final    Platelets 01/06/2022 314  140 - 400 Thousand/uL Final    MPV 01/06/2022 9.5  7.5 - 12.5 fL Final    Neutrophils, Abs 01/06/2022 3,898  1,500 - 7,800 cells/uL Final    Lymph # 01/06/2022 2,513  850 - 3,900 cells/uL Final    Mono # 01/06/2022 462  200 - 950 cells/uL Final    Eos # 01/06/2022 156  15 - 500 cells/uL Final    Baso # 01/06/2022 71  0 - 200 cells/uL Final    Neutrophils Relative 01/06/2022 54.9  % Final    Lymph % 01/06/2022 35.4  % Final    Mono % 01/06/2022 6.5  % Final    Eosinophil % 01/06/2022 2.2  % Final    Basophil % 01/06/2022 1.0  % Final    Glucose 01/06/2022 84  65 - 99 mg/dL Final    BUN 01/06/2022 13  7 - 25 mg/dL Final    Creatinine 01/06/2022 0.81  0.50 - 0.99 mg/dL Final    eGFR if non African American 01/06/2022 79  > OR = 60 mL/min/1.73m2 Final    eGFR if African American 01/06/2022 91  > OR = 60 mL/min/1.73m2 Final    BUN/Creatinine Ratio 01/06/2022 NOT APPLICABLE  6 - 22 (calc) Final    Sodium  01/06/2022 141  135 - 146 mmol/L Final    Potassium 01/06/2022 4.2  3.5 - 5.3 mmol/L Final    Chloride 01/06/2022 105  98 - 110 mmol/L Final    CO2 01/06/2022 27  20 - 32 mmol/L Final    Calcium 01/06/2022 9.1  8.6 - 10.4 mg/dL Final    Total Protein 01/06/2022 7.3  6.1 - 8.1 g/dL Final    Albumin 01/06/2022 4.2  3.6 - 5.1 g/dL Final    Globulin, Total 01/06/2022 3.1  1.9 - 3.7 g/dL (calc) Final    Albumin/Globulin Ratio 01/06/2022 1.4  1.0 - 2.5 (calc) Final    Total Bilirubin 01/06/2022 0.4  0.2 - 1.2 mg/dL Final    Alkaline Phosphatase 01/06/2022 97  37 - 153 U/L Final    AST 01/06/2022 21  10 - 35 U/L Final    ALT 01/06/2022 22  6 - 29 U/L Final    Cholesterol 01/06/2022 226 (H)  <200 mg/dL Final    HDL 01/06/2022 55  > OR = 50 mg/dL Final    Triglycerides 01/06/2022 132  <150 mg/dL Final    LDL Cholesterol 01/06/2022 145 (H)  mg/dL (calc) Final    HDL/Cholesterol Ratio 01/06/2022 4.1  <5.0 (calc) Final    Non HDL Chol. (LDL+VLDL) 01/06/2022 171 (H)  <130 mg/dL (calc) Final    TSH w/reflex to FT4 01/06/2022 1.40  0.40 - 4.50 mIU/L Final    Color, UA 01/06/2022 DARK YELLOW  YELLOW Final    Appearance, UA 01/06/2022 CLEAR  CLEAR Final    Specific Gravity, UA 01/06/2022 1.024  1.001 - 1.035 Final    pH, UA 01/06/2022 5.5  5.0 - 8.0 Final    Glucose, UA 01/06/2022 NEGATIVE  NEGATIVE Final    Bilirubin, UA 01/06/2022 NEGATIVE  NEGATIVE Final    Ketones, UA 01/06/2022 NEGATIVE  NEGATIVE Final    Occult Blood UA 01/06/2022 NEGATIVE  NEGATIVE Final    Protein, UA 01/06/2022 NEGATIVE  NEGATIVE Final    Nitrite, UA 01/06/2022 NEGATIVE  NEGATIVE Final    Leukocytes, UA 01/06/2022 TRACE (A)  NEGATIVE Final    WBC Casts, UA 01/06/2022 0-5  < OR = 5 /HPF Final    RBC Casts, UA 01/06/2022 0-2  < OR = 2 /HPF Final    Squam Epithel, UA 01/06/2022 6-10 (A)  < OR = 5 /HPF Final    Bacteria, UA 01/06/2022 NONE SEEN  NONE SEEN /HPF Final    Hyaline Casts, UA 01/06/2022 NONE SEEN  NONE SEEN /LPF Final    Reflexive Urine Culture  01/06/2022    Final    Urine Culture, Routine 01/06/2022    Final       Past Medical History:   Diagnosis Date    Adrenal tumor     DVT (deep venous thrombosis) 2012    Hiatal hernia     HTN (hypertension)     Multiple thyroid nodules     two    Stomach ulcer     Stroke     at 31 years old     Social History     Socioeconomic History    Marital status:    Tobacco Use    Smoking status: Former    Smokeless tobacco: Never   Substance and Sexual Activity    Alcohol use: Yes     Alcohol/week: 0.0 standard drinks     Comment: socially    Drug use: No     Past Surgical History:   Procedure Laterality Date    Bilatweral Tubal ligation      ESOPHAGEAL DILATION       History reviewed. No pertinent family history.    Review of patient's allergies indicates:   Allergen Reactions    Codeine Itching    Codeine sulfate      Other reaction(s): Unknown    Lisinopril Other (See Comments)     cough  Other reaction(s): Unknown       Current Outpatient Medications:     linaCLOtide (LINZESS) 145 mcg Cap capsule, Take 145 mcg by mouth once daily., Disp: , Rfl:     phentermine (ADIPEX-P) 37.5 mg tablet, Take 1 tablet (37.5 mg total) by mouth once daily., Disp: 30 tablet, Rfl: 0    amLODIPine (NORVASC) 5 MG tablet, Take 1 tablet (5 mg total) by mouth once daily., Disp: 90 tablet, Rfl: 1    cyclobenzaprine (FLEXERIL) 10 MG tablet, Take 1 tablet (10 mg total) by mouth 3 (three) times daily as needed for Muscle spasms., Disp: 90 tablet, Rfl: 0    ELIQUIS 5 mg Tab, Take 1 tablet (5 mg total) by mouth 2 (two) times daily., Disp: 180 tablet, Rfl: 1    HYDROcodone-acetaminophen (NORCO) 5-325 mg per tablet, Take 1 tablet by mouth every 12 (twelve) hours as needed for Pain., Disp: 60 tablet, Rfl: 0    [START ON 3/7/2023] HYDROcodone-acetaminophen (NORCO) 5-325 mg per tablet, Take 1 tablet by mouth every 12 (twelve) hours as needed for Pain., Disp: 60 tablet, Rfl: 0    [START ON 4/7/2023] HYDROcodone-acetaminophen (NORCO) 5-325 mg per tablet,  "Take 1 tablet by mouth every 12 (twelve) hours as needed for Pain., Disp: 60 tablet, Rfl: 0    pantoprazole (PROTONIX) 40 MG tablet, Take 1 tablet (40 mg total) by mouth once daily., Disp: 90 tablet, Rfl: 1    pravastatin (PRAVACHOL) 40 MG tablet, Take 1 tablet (40 mg total) by mouth once daily., Disp: 90 tablet, Rfl: 0    Review of Systems   Constitutional:  Negative for chills, fever and unexpected weight change.   HENT:  Negative for ear pain, rhinorrhea and sore throat.    Eyes:  Negative for pain and visual disturbance.   Respiratory:  Negative for cough and shortness of breath.    Cardiovascular:  Negative for chest pain, palpitations and leg swelling.   Gastrointestinal:  Negative for abdominal pain, diarrhea, nausea and vomiting.   Genitourinary:  Negative for difficulty urinating, hematuria and vaginal bleeding.   Musculoskeletal:  Negative for arthralgias.   Skin:  Negative for rash.   Neurological:  Negative for dizziness, weakness and headaches.   Psychiatric/Behavioral:  Negative for agitation and sleep disturbance. The patient is not nervous/anxious.         Objective:      Vitals:    02/01/23 1131   BP: 136/86   Pulse: 72   Weight: 89.8 kg (198 lb)   Height: 5' 3.5" (1.613 m)     Physical Exam  Vitals and nursing note reviewed.   Constitutional:       General: She is not in acute distress.     Appearance: Normal appearance. She is well-developed.   HENT:      Head: Normocephalic.      Right Ear: External ear normal.      Left Ear: External ear normal.   Eyes:      Conjunctiva/sclera: Conjunctivae normal.      Pupils: Pupils are equal, round, and reactive to light.   Neck:      Vascular: No JVD.   Cardiovascular:      Rate and Rhythm: Normal rate and regular rhythm.      Heart sounds: No murmur heard.  Pulmonary:      Effort: Pulmonary effort is normal.      Breath sounds: Normal breath sounds.   Abdominal:      General: Bowel sounds are normal.      Palpations: Abdomen is soft.   Musculoskeletal:    "      General: No deformity.      Cervical back: Normal range of motion and neck supple.      Lumbar back: Decreased range of motion.   Lymphadenopathy:      Cervical: No cervical adenopathy.   Skin:     General: Skin is warm and dry.      Findings: No rash.   Neurological:      Mental Status: She is alert and oriented to person, place, and time.      Gait: Gait normal.   Psychiatric:         Speech: Speech normal.         Behavior: Behavior normal.         Assessment:       1. Long term (current) use of anticoagulants    2. Low back pain, unspecified back pain laterality, unspecified chronicity, unspecified whether sciatica present    3. Coagulation disorder    4. Gastroesophageal reflux disease, unspecified whether esophagitis present    5. Hypertension, unspecified type    6. Mixed hyperlipidemia Well controlled   7. High risk medication use    8. Hyperlipidemia, unspecified hyperlipidemia type           Plan:       Long term (current) use of anticoagulants    Low back pain, unspecified back pain laterality, unspecified chronicity, unspecified whether sciatica present  -     cyclobenzaprine (FLEXERIL) 10 MG tablet; Take 1 tablet (10 mg total) by mouth 3 (three) times daily as needed for Muscle spasms.  Dispense: 90 tablet; Refill: 0    Coagulation disorder  -     ELIQUIS 5 mg Tab; Take 1 tablet (5 mg total) by mouth 2 (two) times daily.  Dispense: 180 tablet; Refill: 1    Gastroesophageal reflux disease, unspecified whether esophagitis present  -     pantoprazole (PROTONIX) 40 MG tablet; Take 1 tablet (40 mg total) by mouth once daily.  Dispense: 90 tablet; Refill: 1    Hypertension, unspecified type  -     amLODIPine (NORVASC) 5 MG tablet; Take 1 tablet (5 mg total) by mouth once daily.  Dispense: 90 tablet; Refill: 1    Mixed hyperlipidemia  -     pravastatin (PRAVACHOL) 40 MG tablet; Take 1 tablet (40 mg total) by mouth once daily.  Dispense: 90 tablet; Refill: 0    High risk medication use  -     pravastatin  (PRAVACHOL) 40 MG tablet; Take 1 tablet (40 mg total) by mouth once daily.  Dispense: 90 tablet; Refill: 0    Hyperlipidemia, unspecified hyperlipidemia type      Follow up in about 3 months (around 5/1/2023), or if symptoms worsen or fail to improve, for medication management.        2/12/2023 Angi Barrett

## 2023-03-02 ENCOUNTER — TELEPHONE (OUTPATIENT)
Dept: FAMILY MEDICINE | Facility: CLINIC | Age: 63
End: 2023-03-02

## 2023-03-15 DIAGNOSIS — Z12.31 OTHER SCREENING MAMMOGRAM: ICD-10-CM

## 2023-03-20 DIAGNOSIS — M54.50 LOW BACK PAIN, UNSPECIFIED BACK PAIN LATERALITY, UNSPECIFIED CHRONICITY, UNSPECIFIED WHETHER SCIATICA PRESENT: ICD-10-CM

## 2023-03-20 RX ORDER — CYCLOBENZAPRINE HCL 10 MG
10 TABLET ORAL 3 TIMES DAILY PRN
Qty: 90 TABLET | Refills: 2 | Status: CANCELLED | OUTPATIENT
Start: 2023-03-20

## 2023-04-11 ENCOUNTER — PATIENT MESSAGE (OUTPATIENT)
Dept: ADMINISTRATIVE | Facility: HOSPITAL | Age: 63
End: 2023-04-11

## 2023-04-21 ENCOUNTER — TELEPHONE (OUTPATIENT)
Dept: FAMILY MEDICINE | Facility: CLINIC | Age: 63
End: 2023-04-21

## 2023-05-02 ENCOUNTER — TELEPHONE (OUTPATIENT)
Dept: FAMILY MEDICINE | Facility: CLINIC | Age: 63
End: 2023-05-02

## 2023-05-02 NOTE — TELEPHONE ENCOUNTER
Spoke with patient and got her scheduled for May 17th - she would like to let Saranya know her  spoke with their insurance and got everything straightened out.

## 2023-05-02 NOTE — TELEPHONE ENCOUNTER
----- Message from Akila De La Cruz sent at 5/2/2023  1:01 PM CDT -----  - pt had a lapse in insurance and would like to come in next Tuesday and Wednesday when it will be straight   [phone]

## 2023-05-04 ENCOUNTER — TELEPHONE (OUTPATIENT)
Dept: FAMILY MEDICINE | Facility: CLINIC | Age: 63
End: 2023-05-04

## 2023-05-06 DIAGNOSIS — I10 HYPERTENSION, UNSPECIFIED TYPE: ICD-10-CM

## 2023-05-06 DIAGNOSIS — E78.2 MIXED HYPERLIPIDEMIA: ICD-10-CM

## 2023-05-06 DIAGNOSIS — Z79.899 HIGH RISK MEDICATION USE: ICD-10-CM

## 2023-05-08 RX ORDER — PRAVASTATIN SODIUM 40 MG/1
40 TABLET ORAL DAILY
Qty: 90 TABLET | Refills: 0 | Status: SHIPPED | OUTPATIENT
Start: 2023-05-08 | End: 2023-05-17 | Stop reason: SDUPTHER

## 2023-05-08 RX ORDER — AMLODIPINE BESYLATE 5 MG/1
5 TABLET ORAL DAILY
Qty: 90 TABLET | Refills: 3 | Status: SHIPPED | OUTPATIENT
Start: 2023-05-08 | End: 2023-05-17 | Stop reason: SDUPTHER

## 2023-05-17 ENCOUNTER — TELEPHONE (OUTPATIENT)
Dept: FAMILY MEDICINE | Facility: CLINIC | Age: 63
End: 2023-05-17

## 2023-05-17 ENCOUNTER — OFFICE VISIT (OUTPATIENT)
Dept: FAMILY MEDICINE | Facility: CLINIC | Age: 63
End: 2023-05-17
Payer: COMMERCIAL

## 2023-05-17 VITALS
HEIGHT: 64 IN | SYSTOLIC BLOOD PRESSURE: 136 MMHG | BODY MASS INDEX: 34.31 KG/M2 | DIASTOLIC BLOOD PRESSURE: 80 MMHG | WEIGHT: 201 LBS | HEART RATE: 64 BPM

## 2023-05-17 DIAGNOSIS — K59.00 CONSTIPATION, UNSPECIFIED CONSTIPATION TYPE: Primary | ICD-10-CM

## 2023-05-17 DIAGNOSIS — I10 HYPERTENSION, UNSPECIFIED TYPE: ICD-10-CM

## 2023-05-17 DIAGNOSIS — D68.9 COAGULATION DISORDER: ICD-10-CM

## 2023-05-17 DIAGNOSIS — E78.2 MIXED HYPERLIPIDEMIA: ICD-10-CM

## 2023-05-17 DIAGNOSIS — M54.50 LOW BACK PAIN, UNSPECIFIED BACK PAIN LATERALITY, UNSPECIFIED CHRONICITY, UNSPECIFIED WHETHER SCIATICA PRESENT: ICD-10-CM

## 2023-05-17 DIAGNOSIS — K21.9 GASTROESOPHAGEAL REFLUX DISEASE, UNSPECIFIED WHETHER ESOPHAGITIS PRESENT: ICD-10-CM

## 2023-05-17 DIAGNOSIS — Z79.899 HIGH RISK MEDICATION USE: ICD-10-CM

## 2023-05-17 PROCEDURE — 3008F BODY MASS INDEX DOCD: CPT | Mod: CPTII,S$GLB,, | Performed by: NURSE PRACTITIONER

## 2023-05-17 PROCEDURE — 1159F MED LIST DOCD IN RCRD: CPT | Mod: CPTII,S$GLB,, | Performed by: NURSE PRACTITIONER

## 2023-05-17 PROCEDURE — 3079F DIAST BP 80-89 MM HG: CPT | Mod: CPTII,S$GLB,, | Performed by: NURSE PRACTITIONER

## 2023-05-17 PROCEDURE — 3008F PR BODY MASS INDEX (BMI) DOCUMENTED: ICD-10-PCS | Mod: CPTII,S$GLB,, | Performed by: NURSE PRACTITIONER

## 2023-05-17 PROCEDURE — 99214 OFFICE O/P EST MOD 30 MIN: CPT | Mod: S$GLB,,, | Performed by: NURSE PRACTITIONER

## 2023-05-17 PROCEDURE — 3061F NEG MICROALBUMINURIA REV: CPT | Mod: CPTII,S$GLB,, | Performed by: NURSE PRACTITIONER

## 2023-05-17 PROCEDURE — 3061F PR NEG MICROALBUMINURIA RESULT DOCUMENTED/REVIEW: ICD-10-PCS | Mod: CPTII,S$GLB,, | Performed by: NURSE PRACTITIONER

## 2023-05-17 PROCEDURE — 3075F PR MOST RECENT SYSTOLIC BLOOD PRESS GE 130-139MM HG: ICD-10-PCS | Mod: CPTII,S$GLB,, | Performed by: NURSE PRACTITIONER

## 2023-05-17 PROCEDURE — 3066F NEPHROPATHY DOC TX: CPT | Mod: CPTII,S$GLB,, | Performed by: NURSE PRACTITIONER

## 2023-05-17 PROCEDURE — 1160F RVW MEDS BY RX/DR IN RCRD: CPT | Mod: CPTII,S$GLB,, | Performed by: NURSE PRACTITIONER

## 2023-05-17 PROCEDURE — 1159F PR MEDICATION LIST DOCUMENTED IN MEDICAL RECORD: ICD-10-PCS | Mod: CPTII,S$GLB,, | Performed by: NURSE PRACTITIONER

## 2023-05-17 PROCEDURE — 3066F PR DOCUMENTATION OF TREATMENT FOR NEPHROPATHY: ICD-10-PCS | Mod: CPTII,S$GLB,, | Performed by: NURSE PRACTITIONER

## 2023-05-17 PROCEDURE — 3079F PR MOST RECENT DIASTOLIC BLOOD PRESSURE 80-89 MM HG: ICD-10-PCS | Mod: CPTII,S$GLB,, | Performed by: NURSE PRACTITIONER

## 2023-05-17 PROCEDURE — 99214 PR OFFICE/OUTPT VISIT, EST, LEVL IV, 30-39 MIN: ICD-10-PCS | Mod: S$GLB,,, | Performed by: NURSE PRACTITIONER

## 2023-05-17 PROCEDURE — 3075F SYST BP GE 130 - 139MM HG: CPT | Mod: CPTII,S$GLB,, | Performed by: NURSE PRACTITIONER

## 2023-05-17 PROCEDURE — 1160F PR REVIEW ALL MEDS BY PRESCRIBER/CLIN PHARMACIST DOCUMENTED: ICD-10-PCS | Mod: CPTII,S$GLB,, | Performed by: NURSE PRACTITIONER

## 2023-05-17 RX ORDER — PRAVASTATIN SODIUM 40 MG/1
40 TABLET ORAL DAILY
Qty: 90 TABLET | Refills: 1 | Status: SHIPPED | OUTPATIENT
Start: 2023-05-17 | End: 2023-08-19 | Stop reason: SDUPTHER

## 2023-05-17 RX ORDER — PANTOPRAZOLE SODIUM 40 MG/1
40 TABLET, DELAYED RELEASE ORAL DAILY
Qty: 90 TABLET | Refills: 1 | Status: SHIPPED | OUTPATIENT
Start: 2023-05-17 | End: 2024-01-10 | Stop reason: SDUPTHER

## 2023-05-17 RX ORDER — HYDROCODONE BITARTRATE AND ACETAMINOPHEN 5; 325 MG/1; MG/1
1 TABLET ORAL EVERY 12 HOURS PRN
Qty: 60 TABLET | Refills: 0 | Status: SHIPPED | OUTPATIENT
Start: 2023-05-17 | End: 2023-06-16

## 2023-05-17 RX ORDER — AMLODIPINE BESYLATE 5 MG/1
5 TABLET ORAL DAILY
Qty: 90 TABLET | Refills: 3 | Status: SHIPPED | OUTPATIENT
Start: 2023-05-17 | End: 2024-01-10 | Stop reason: SDUPTHER

## 2023-05-17 RX ORDER — CYCLOBENZAPRINE HCL 10 MG
10 TABLET ORAL 3 TIMES DAILY PRN
Qty: 90 TABLET | Refills: 2 | Status: SHIPPED | OUTPATIENT
Start: 2023-05-17 | End: 2023-10-02 | Stop reason: SDUPTHER

## 2023-05-17 RX ORDER — APIXABAN 5 MG/1
5 TABLET, FILM COATED ORAL 2 TIMES DAILY
Qty: 180 TABLET | Refills: 1 | Status: SHIPPED | OUTPATIENT
Start: 2023-05-17 | End: 2023-08-04 | Stop reason: SDUPTHER

## 2023-05-17 NOTE — PROGRESS NOTES
SUBJECTIVE:    Patient ID: Jessica Mitchell is a 62 y.o. female.    Chief Complaint: Follow-up (Brought bottles// SW)      62year old female presents for check up. She is treated for anxiety , hypertension, hyperlipidemia, gerd, constipation, arthritis, history of dvt, and coagulation disorder. . currently Works cleaning houses. Takes eliquis ddaily. Does have daily back pain controlled with norco. Bp in office is well controlled. 136/80 in office today. Does not check at home. Taking norvasc daily as prescribedProtonix is controlling symptoms of gerd.  Needs refills. Due for labs, mammogram, and colonoscopy. Plans to have labs done today      Refill on 01/29/2023   Component Date Value Ref Range Status    Color, UA 05/17/2023 YELLOW  YELLOW Final    Appearance, UA 05/17/2023 CLEAR  CLEAR Final    Specific Gravity, UA 05/17/2023 1.009  1.001 - 1.035 Final    pH, UA 05/17/2023 6.0  5.0 - 8.0 Final    Glucose, UA 05/17/2023 NEGATIVE  NEGATIVE Final    Bilirubin, UA 05/17/2023 NEGATIVE  NEGATIVE Final    Ketones, UA 05/17/2023 NEGATIVE  NEGATIVE Final    Occult Blood UA 05/17/2023 NEGATIVE  NEGATIVE Final    Protein, UA 05/17/2023 NEGATIVE  NEGATIVE Final    Nitrite, UA 05/17/2023 NEGATIVE  NEGATIVE Final    Leukocytes, UA 05/17/2023 TRACE (A)  NEGATIVE Final    WBC Casts, UA 05/17/2023 NONE SEEN  < OR = 5 /HPF Final    RBC Casts, UA 05/17/2023 NONE SEEN  < OR = 2 /HPF Final    Squam Epithel, UA 05/17/2023 0-5  < OR = 5 /HPF Final    Bacteria, UA 05/17/2023 NONE SEEN  NONE SEEN /HPF Final    Hyaline Casts, UA 05/17/2023 NONE SEEN  NONE SEEN /LPF Final    Service Cmt: 05/17/2023    Final    Reflexive Urine Culture 05/17/2023    Final    Urine Culture, Routine 05/17/2023    Final    TSH w/reflex to FT4 05/17/2023 1.36  0.40 - 4.50 mIU/L Final    Cholesterol 05/17/2023 215 (H)  <200 mg/dL Final    HDL 05/17/2023 51  > OR = 50 mg/dL Final    Triglycerides 05/17/2023 142  <150 mg/dL Final    LDL Cholesterol 05/17/2023 137  (H)  mg/dL (calc) Final    HDL/Cholesterol Ratio 05/17/2023 4.2  <5.0 (calc) Final    Non HDL Chol. (LDL+VLDL) 05/17/2023 164 (H)  <130 mg/dL (calc) Final    Glucose 05/17/2023 88  65 - 99 mg/dL Final    BUN 05/17/2023 9  7 - 25 mg/dL Final    Creatinine 05/17/2023 0.76  0.50 - 1.05 mg/dL Final    eGFR 05/17/2023 89  > OR = 60 mL/min/1.73m2 Final    BUN/Creatinine Ratio 05/17/2023 NOT APPLICABLE  6 - 22 (calc) Final    Sodium 05/17/2023 140  135 - 146 mmol/L Final    Potassium 05/17/2023 4.4  3.5 - 5.3 mmol/L Final    Chloride 05/17/2023 103  98 - 110 mmol/L Final    CO2 05/17/2023 29  20 - 32 mmol/L Final    Calcium 05/17/2023 9.2  8.6 - 10.4 mg/dL Final    Total Protein 05/17/2023 7.0  6.1 - 8.1 g/dL Final    Albumin 05/17/2023 4.0  3.6 - 5.1 g/dL Final    Globulin, Total 05/17/2023 3.0  1.9 - 3.7 g/dL (calc) Final    Albumin/Globulin Ratio 05/17/2023 1.3  1.0 - 2.5 (calc) Final    Total Bilirubin 05/17/2023 0.4  0.2 - 1.2 mg/dL Final    Alkaline Phosphatase 05/17/2023 71  37 - 153 U/L Final    AST 05/17/2023 19  10 - 35 U/L Final    ALT 05/17/2023 22  6 - 29 U/L Final    WBC 05/17/2023 6.1  3.8 - 10.8 Thousand/uL Final    RBC 05/17/2023 5.15 (H)  3.80 - 5.10 Million/uL Final    Hemoglobin 05/17/2023 14.7  11.7 - 15.5 g/dL Final    Hematocrit 05/17/2023 44.7  35.0 - 45.0 % Final    MCV 05/17/2023 86.8  80.0 - 100.0 fL Final    MCH 05/17/2023 28.5  27.0 - 33.0 pg Final    MCHC 05/17/2023 32.9  32.0 - 36.0 g/dL Final    RDW 05/17/2023 12.2  11.0 - 15.0 % Final    Platelets 05/17/2023 265  140 - 400 Thousand/uL Final    MPV 05/17/2023 9.9  7.5 - 12.5 fL Final    Neutrophils, Abs 05/17/2023 3,160  1,500 - 7,800 cells/uL Final    Lymph # 05/17/2023 2,379  850 - 3,900 cells/uL Final    Mono # 05/17/2023 366  200 - 950 cells/uL Final    Eos # 05/17/2023 122  15 - 500 cells/uL Final    Baso # 05/17/2023 73  0 - 200 cells/uL Final    Neutrophils Relative 05/17/2023 51.8  % Final    Lymph % 05/17/2023 39.0  % Final    Mono  % 05/17/2023 6.0  % Final    Eosinophil % 05/17/2023 2.0  % Final    Basophil % 05/17/2023 1.2  % Final    Creatinine, Urine 05/17/2023 81  20 - 275 mg/dL Final    Microalb, Ur 05/17/2023 0.6  See Note: mg/dL Final    Microalb/Creat Ratio 05/17/2023 7  <30 mcg/mg creat Final       Past Medical History:   Diagnosis Date    Adrenal tumor     DVT (deep venous thrombosis) 2012    Hiatal hernia     HTN (hypertension)     Multiple thyroid nodules     two    Stomach ulcer     Stroke     at 31 years old     Social History     Socioeconomic History    Marital status:    Tobacco Use    Smoking status: Former     Passive exposure: Past    Smokeless tobacco: Never   Substance and Sexual Activity    Alcohol use: Yes     Alcohol/week: 0.0 standard drinks     Comment: socially    Drug use: No     Past Surgical History:   Procedure Laterality Date    Bilatweral Tubal ligation      ESOPHAGEAL DILATION       History reviewed. No pertinent family history.    Review of patient's allergies indicates:   Allergen Reactions    Codeine Itching    Codeine sulfate      Other reaction(s): Unknown    Lisinopril Other (See Comments)     cough  Other reaction(s): Unknown       Current Outpatient Medications:     linaCLOtide (LINZESS) 145 mcg Cap capsule, Take 145 mcg by mouth once daily., Disp: , Rfl:     amLODIPine (NORVASC) 5 MG tablet, Take 1 tablet (5 mg total) by mouth once daily., Disp: 90 tablet, Rfl: 3    cyclobenzaprine (FLEXERIL) 10 MG tablet, Take 1 tablet (10 mg total) by mouth 3 (three) times daily as needed for Muscle spasms., Disp: 90 tablet, Rfl: 2    ELIQUIS 5 mg Tab, Take 1 tablet (5 mg total) by mouth 2 (two) times daily., Disp: 180 tablet, Rfl: 1    HYDROcodone-acetaminophen (NORCO) 5-325 mg per tablet, Take 1 tablet by mouth every 12 (twelve) hours as needed for Pain., Disp: 60 tablet, Rfl: 0    pantoprazole (PROTONIX) 40 MG tablet, Take 1 tablet (40 mg total) by mouth once daily., Disp: 90 tablet, Rfl: 1     "pravastatin (PRAVACHOL) 40 MG tablet, Take 1 tablet (40 mg total) by mouth once daily., Disp: 90 tablet, Rfl: 1    Review of Systems   Constitutional:  Negative for chills, fever and unexpected weight change.   HENT:  Negative for ear pain, rhinorrhea and sore throat.    Eyes:  Negative for pain and visual disturbance.   Respiratory:  Negative for cough and shortness of breath.    Cardiovascular:  Negative for chest pain, palpitations and leg swelling.   Gastrointestinal:  Negative for abdominal pain, diarrhea, nausea and vomiting.   Genitourinary:  Negative for difficulty urinating, hematuria and vaginal bleeding.   Musculoskeletal:  Negative for arthralgias.   Skin:  Negative for rash.   Neurological:  Negative for dizziness, weakness and headaches.   Psychiatric/Behavioral:  Negative for agitation and sleep disturbance. The patient is not nervous/anxious.         Objective:      Vitals:    05/17/23 1250   BP: 136/80   Pulse: 64   Weight: 91.2 kg (201 lb)   Height: 5' 3.5" (1.613 m)     Physical Exam  Vitals and nursing note reviewed.   Constitutional:       General: She is not in acute distress.     Appearance: Normal appearance. She is well-developed. She is obese.   HENT:      Head: Normocephalic.      Right Ear: External ear normal.      Left Ear: External ear normal.   Eyes:      Conjunctiva/sclera: Conjunctivae normal.      Pupils: Pupils are equal, round, and reactive to light.   Neck:      Vascular: No JVD.   Cardiovascular:      Rate and Rhythm: Normal rate and regular rhythm.      Heart sounds: No murmur heard.  Pulmonary:      Effort: Pulmonary effort is normal.      Breath sounds: Normal breath sounds.   Abdominal:      General: Bowel sounds are normal.      Palpations: Abdomen is soft.   Musculoskeletal:         General: No deformity.      Cervical back: Normal range of motion and neck supple.      Lumbar back: Decreased range of motion. Negative right straight leg raise test and negative left " straight leg raise test.   Lymphadenopathy:      Cervical: No cervical adenopathy.   Skin:     General: Skin is warm and dry.      Findings: No rash.   Neurological:      Mental Status: She is alert and oriented to person, place, and time.      Gait: Gait normal.   Psychiatric:         Speech: Speech normal.         Behavior: Behavior normal.         Assessment:       1. Constipation, unspecified constipation type    2. Low back pain, unspecified back pain laterality, unspecified chronicity, unspecified whether sciatica present    3. Gastroesophageal reflux disease, unspecified whether esophagitis present    4. Mixed hyperlipidemia Well controlled   5. High risk medication use    6. Coagulation disorder    7. Hypertension, unspecified type         Plan:       Constipation, unspecified constipation type  Comments:  continue linzess    Low back pain, unspecified back pain laterality, unspecified chronicity, unspecified whether sciatica present  Comments:  continue norco  Orders:  -     cyclobenzaprine (FLEXERIL) 10 MG tablet; Take 1 tablet (10 mg total) by mouth 3 (three) times daily as needed for Muscle spasms.  Dispense: 90 tablet; Refill: 2    Gastroesophageal reflux disease, unspecified whether esophagitis present  Comments:  continue protonix  Orders:  -     pantoprazole (PROTONIX) 40 MG tablet; Take 1 tablet (40 mg total) by mouth once daily.  Dispense: 90 tablet; Refill: 1    Mixed hyperlipidemia  Comments:  continue pravastatin  Orders:  -     pravastatin (PRAVACHOL) 40 MG tablet; Take 1 tablet (40 mg total) by mouth once daily.  Dispense: 90 tablet; Refill: 1    High risk medication use  -     pravastatin (PRAVACHOL) 40 MG tablet; Take 1 tablet (40 mg total) by mouth once daily.  Dispense: 90 tablet; Refill: 1    Coagulation disorder  Comments:  continue eliquis  Orders:  -     ELIQUIS 5 mg Tab; Take 1 tablet (5 mg total) by mouth 2 (two) times daily.  Dispense: 180 tablet; Refill: 1    Hypertension,  unspecified type  Comments:  continue norvasc  Orders:  -     amLODIPine (NORVASC) 5 MG tablet; Take 1 tablet (5 mg total) by mouth once daily.  Dispense: 90 tablet; Refill: 3      No follow-ups on file.        5/28/2023 Angi Barrett

## 2023-05-19 LAB
ALBUMIN SERPL-MCNC: 4 G/DL (ref 3.6–5.1)
ALBUMIN/CREAT UR: 7 MCG/MG CREAT
ALBUMIN/GLOB SERPL: 1.3 (CALC) (ref 1–2.5)
ALP SERPL-CCNC: 71 U/L (ref 37–153)
ALT SERPL-CCNC: 22 U/L (ref 6–29)
APPEARANCE UR: CLEAR
AST SERPL-CCNC: 19 U/L (ref 10–35)
BACTERIA #/AREA URNS HPF: ABNORMAL /HPF
BACTERIA UR CULT: ABNORMAL
BACTERIA UR CULT: ABNORMAL
BASOPHILS # BLD AUTO: 73 CELLS/UL (ref 0–200)
BASOPHILS NFR BLD AUTO: 1.2 %
BILIRUB SERPL-MCNC: 0.4 MG/DL (ref 0.2–1.2)
BILIRUB UR QL STRIP: NEGATIVE
BUN SERPL-MCNC: 9 MG/DL (ref 7–25)
BUN/CREAT SERPL: NORMAL (CALC) (ref 6–22)
CALCIUM SERPL-MCNC: 9.2 MG/DL (ref 8.6–10.4)
CHLORIDE SERPL-SCNC: 103 MMOL/L (ref 98–110)
CHOLEST SERPL-MCNC: 215 MG/DL
CHOLEST/HDLC SERPL: 4.2 (CALC)
CO2 SERPL-SCNC: 29 MMOL/L (ref 20–32)
COLOR UR: YELLOW
CREAT SERPL-MCNC: 0.76 MG/DL (ref 0.5–1.05)
CREAT UR-MCNC: 81 MG/DL (ref 20–275)
EGFR: 89 ML/MIN/1.73M2
EOSINOPHIL # BLD AUTO: 122 CELLS/UL (ref 15–500)
EOSINOPHIL NFR BLD AUTO: 2 %
ERYTHROCYTE [DISTWIDTH] IN BLOOD BY AUTOMATED COUNT: 12.2 % (ref 11–15)
GLOBULIN SER CALC-MCNC: 3 G/DL (CALC) (ref 1.9–3.7)
GLUCOSE SERPL-MCNC: 88 MG/DL (ref 65–99)
GLUCOSE UR QL STRIP: NEGATIVE
HCT VFR BLD AUTO: 44.7 % (ref 35–45)
HDLC SERPL-MCNC: 51 MG/DL
HGB BLD-MCNC: 14.7 G/DL (ref 11.7–15.5)
HGB UR QL STRIP: NEGATIVE
HYALINE CASTS #/AREA URNS LPF: ABNORMAL /LPF
KETONES UR QL STRIP: NEGATIVE
LDLC SERPL CALC-MCNC: 137 MG/DL (CALC)
LEUKOCYTE ESTERASE UR QL STRIP: ABNORMAL
LYMPHOCYTES # BLD AUTO: 2379 CELLS/UL (ref 850–3900)
LYMPHOCYTES NFR BLD AUTO: 39 %
MCH RBC QN AUTO: 28.5 PG (ref 27–33)
MCHC RBC AUTO-ENTMCNC: 32.9 G/DL (ref 32–36)
MCV RBC AUTO: 86.8 FL (ref 80–100)
MICROALBUMIN UR-MCNC: 0.6 MG/DL
MONOCYTES # BLD AUTO: 366 CELLS/UL (ref 200–950)
MONOCYTES NFR BLD AUTO: 6 %
NEUTROPHILS # BLD AUTO: 3160 CELLS/UL (ref 1500–7800)
NEUTROPHILS NFR BLD AUTO: 51.8 %
NITRITE UR QL STRIP: NEGATIVE
NONHDLC SERPL-MCNC: 164 MG/DL (CALC)
PH UR STRIP: 6 [PH] (ref 5–8)
PLATELET # BLD AUTO: 265 THOUSAND/UL (ref 140–400)
PMV BLD REES-ECKER: 9.9 FL (ref 7.5–12.5)
POTASSIUM SERPL-SCNC: 4.4 MMOL/L (ref 3.5–5.3)
PROT SERPL-MCNC: 7 G/DL (ref 6.1–8.1)
PROT UR QL STRIP: NEGATIVE
RBC # BLD AUTO: 5.15 MILLION/UL (ref 3.8–5.1)
RBC #/AREA URNS HPF: ABNORMAL /HPF
SERVICE CMNT-IMP: ABNORMAL
SODIUM SERPL-SCNC: 140 MMOL/L (ref 135–146)
SP GR UR STRIP: 1.01 (ref 1–1.03)
SQUAMOUS #/AREA URNS HPF: ABNORMAL /HPF
TRIGL SERPL-MCNC: 142 MG/DL
TSH SERPL-ACNC: 1.36 MIU/L (ref 0.4–4.5)
WBC # BLD AUTO: 6.1 THOUSAND/UL (ref 3.8–10.8)
WBC #/AREA URNS HPF: ABNORMAL /HPF

## 2023-05-29 ENCOUNTER — TELEPHONE (OUTPATIENT)
Dept: FAMILY MEDICINE | Facility: CLINIC | Age: 63
End: 2023-05-29

## 2023-05-29 NOTE — TELEPHONE ENCOUNTER
----- Message from Angi Barrett NP sent at 5/28/2023  9:05 PM CDT -----  Cholesterol slightly elevated. This has decreased some since last lab draw. Rest of labs are wnl.

## 2023-07-10 ENCOUNTER — PATIENT MESSAGE (OUTPATIENT)
Dept: FAMILY MEDICINE | Facility: CLINIC | Age: 63
End: 2023-07-10

## 2023-07-11 ENCOUNTER — PATIENT MESSAGE (OUTPATIENT)
Dept: FAMILY MEDICINE | Facility: CLINIC | Age: 63
End: 2023-07-11

## 2023-07-18 ENCOUNTER — PATIENT MESSAGE (OUTPATIENT)
Dept: FAMILY MEDICINE | Facility: CLINIC | Age: 63
End: 2023-07-18

## 2023-08-03 ENCOUNTER — PATIENT MESSAGE (OUTPATIENT)
Dept: FAMILY MEDICINE | Facility: CLINIC | Age: 63
End: 2023-08-03

## 2023-08-03 DIAGNOSIS — D68.9 COAGULATION DISORDER: ICD-10-CM

## 2023-08-04 ENCOUNTER — PATIENT MESSAGE (OUTPATIENT)
Dept: FAMILY MEDICINE | Facility: CLINIC | Age: 63
End: 2023-08-04

## 2023-08-13 ENCOUNTER — PATIENT MESSAGE (OUTPATIENT)
Dept: FAMILY MEDICINE | Facility: CLINIC | Age: 63
End: 2023-08-13

## 2023-08-13 DIAGNOSIS — Z79.899 HIGH RISK MEDICATION USE: ICD-10-CM

## 2023-08-13 DIAGNOSIS — E78.2 MIXED HYPERLIPIDEMIA: ICD-10-CM

## 2023-08-13 DIAGNOSIS — I10 HYPERTENSION, UNSPECIFIED TYPE: ICD-10-CM

## 2023-08-13 RX ORDER — PRAVASTATIN SODIUM 40 MG/1
40 TABLET ORAL DAILY
Qty: 90 TABLET | Refills: 1 | Status: CANCELLED | OUTPATIENT
Start: 2023-08-13 | End: 2024-02-09

## 2023-08-13 RX ORDER — AMLODIPINE BESYLATE 5 MG/1
5 TABLET ORAL DAILY
Qty: 90 TABLET | Refills: 3 | Status: CANCELLED | OUTPATIENT
Start: 2023-08-13

## 2023-08-14 ENCOUNTER — PATIENT MESSAGE (OUTPATIENT)
Dept: FAMILY MEDICINE | Facility: CLINIC | Age: 63
End: 2023-08-14

## 2023-08-14 RX ORDER — HYDROCODONE BITARTRATE AND ACETAMINOPHEN 5; 325 MG/1; MG/1
1 TABLET ORAL EVERY 12 HOURS PRN
Qty: 60 TABLET | Refills: 0 | Status: SHIPPED | OUTPATIENT
Start: 2023-08-14 | End: 2023-10-02 | Stop reason: SDUPTHER

## 2023-08-14 NOTE — TELEPHONE ENCOUNTER
Pt is needing a refill on her pravastatin and amlodipine. Last office visit 05/17/2023. No up coming appt.     New prescription for pt's pravastatin was sent to the pt's pharmacy on 05/17/2023 for a 90 day supply with 1 refill.

## 2023-08-19 DIAGNOSIS — E78.2 MIXED HYPERLIPIDEMIA: ICD-10-CM

## 2023-08-19 DIAGNOSIS — Z79.899 HIGH RISK MEDICATION USE: ICD-10-CM

## 2023-08-21 RX ORDER — PRAVASTATIN SODIUM 40 MG/1
40 TABLET ORAL DAILY
Qty: 90 TABLET | Refills: 1 | Status: SHIPPED | OUTPATIENT
Start: 2023-08-21 | End: 2024-01-10 | Stop reason: SDUPTHER

## 2023-09-10 ENCOUNTER — PATIENT MESSAGE (OUTPATIENT)
Dept: FAMILY MEDICINE | Facility: CLINIC | Age: 63
End: 2023-09-10

## 2023-09-11 ENCOUNTER — PATIENT MESSAGE (OUTPATIENT)
Dept: FAMILY MEDICINE | Facility: CLINIC | Age: 63
End: 2023-09-11

## 2023-09-17 ENCOUNTER — PATIENT MESSAGE (OUTPATIENT)
Dept: FAMILY MEDICINE | Facility: CLINIC | Age: 63
End: 2023-09-17

## 2023-09-18 ENCOUNTER — CLINICAL SUPPORT (OUTPATIENT)
Dept: FAMILY MEDICINE | Facility: CLINIC | Age: 63
End: 2023-09-18

## 2023-09-18 DIAGNOSIS — I10 HYPERTENSION, UNSPECIFIED TYPE: Primary | ICD-10-CM

## 2023-09-18 NOTE — PROGRESS NOTES
Patient came in to discuss Eliquis options. States her  started a new job today. She is going to let us know once he has worked enough hours to be able to get the insurance but she has enough Eliquis to last a little while.

## 2023-09-30 ENCOUNTER — PATIENT MESSAGE (OUTPATIENT)
Dept: FAMILY MEDICINE | Facility: CLINIC | Age: 63
End: 2023-09-30

## 2023-09-30 DIAGNOSIS — M54.50 LOW BACK PAIN, UNSPECIFIED BACK PAIN LATERALITY, UNSPECIFIED CHRONICITY, UNSPECIFIED WHETHER SCIATICA PRESENT: ICD-10-CM

## 2023-10-02 RX ORDER — HYDROCODONE BITARTRATE AND ACETAMINOPHEN 5; 325 MG/1; MG/1
1 TABLET ORAL EVERY 12 HOURS PRN
Qty: 60 TABLET | Refills: 0 | Status: SHIPPED | OUTPATIENT
Start: 2023-10-02 | End: 2024-01-10 | Stop reason: SDUPTHER

## 2023-10-02 RX ORDER — CYCLOBENZAPRINE HCL 10 MG
10 TABLET ORAL 3 TIMES DAILY PRN
Qty: 90 TABLET | Refills: 0 | Status: SHIPPED | OUTPATIENT
Start: 2023-10-02 | End: 2024-01-11 | Stop reason: SDUPTHER

## 2023-11-15 ENCOUNTER — PATIENT MESSAGE (OUTPATIENT)
Dept: FAMILY MEDICINE | Facility: CLINIC | Age: 63
End: 2023-11-15

## 2023-11-17 ENCOUNTER — PATIENT MESSAGE (OUTPATIENT)
Dept: FAMILY MEDICINE | Facility: CLINIC | Age: 63
End: 2023-11-17

## 2023-11-25 ENCOUNTER — PATIENT MESSAGE (OUTPATIENT)
Dept: FAMILY MEDICINE | Facility: CLINIC | Age: 63
End: 2023-11-25

## 2023-12-11 ENCOUNTER — PATIENT MESSAGE (OUTPATIENT)
Dept: FAMILY MEDICINE | Facility: CLINIC | Age: 63
End: 2023-12-11

## 2024-01-10 ENCOUNTER — OFFICE VISIT (OUTPATIENT)
Dept: FAMILY MEDICINE | Facility: CLINIC | Age: 64
End: 2024-01-10
Payer: COMMERCIAL

## 2024-01-10 VITALS — WEIGHT: 205 LBS | OXYGEN SATURATION: 98 % | BODY MASS INDEX: 35.74 KG/M2 | HEART RATE: 79 BPM

## 2024-01-10 DIAGNOSIS — M54.40 CHRONIC LOW BACK PAIN WITH SCIATICA, SCIATICA LATERALITY UNSPECIFIED, UNSPECIFIED BACK PAIN LATERALITY: ICD-10-CM

## 2024-01-10 DIAGNOSIS — G89.29 CHRONIC LOW BACK PAIN WITH SCIATICA, SCIATICA LATERALITY UNSPECIFIED, UNSPECIFIED BACK PAIN LATERALITY: ICD-10-CM

## 2024-01-10 DIAGNOSIS — E78.2 MIXED HYPERLIPIDEMIA: ICD-10-CM

## 2024-01-10 DIAGNOSIS — Z79.01 LONG TERM (CURRENT) USE OF ANTICOAGULANTS: Primary | ICD-10-CM

## 2024-01-10 DIAGNOSIS — M54.50 LOW BACK PAIN, UNSPECIFIED BACK PAIN LATERALITY, UNSPECIFIED CHRONICITY, UNSPECIFIED WHETHER SCIATICA PRESENT: Primary | ICD-10-CM

## 2024-01-10 DIAGNOSIS — I10 HYPERTENSION, UNSPECIFIED TYPE: ICD-10-CM

## 2024-01-10 DIAGNOSIS — D68.9 COAGULATION DISORDER: ICD-10-CM

## 2024-01-10 DIAGNOSIS — M54.50 LOW BACK PAIN, UNSPECIFIED BACK PAIN LATERALITY, UNSPECIFIED CHRONICITY, UNSPECIFIED WHETHER SCIATICA PRESENT: ICD-10-CM

## 2024-01-10 DIAGNOSIS — K21.9 GASTROESOPHAGEAL REFLUX DISEASE, UNSPECIFIED WHETHER ESOPHAGITIS PRESENT: ICD-10-CM

## 2024-01-10 DIAGNOSIS — Z79.899 HIGH RISK MEDICATION USE: ICD-10-CM

## 2024-01-10 PROCEDURE — 1160F RVW MEDS BY RX/DR IN RCRD: CPT | Mod: CPTII,S$GLB,, | Performed by: NURSE PRACTITIONER

## 2024-01-10 PROCEDURE — 99214 OFFICE O/P EST MOD 30 MIN: CPT | Mod: S$GLB,,, | Performed by: NURSE PRACTITIONER

## 2024-01-10 PROCEDURE — 1159F MED LIST DOCD IN RCRD: CPT | Mod: CPTII,S$GLB,, | Performed by: NURSE PRACTITIONER

## 2024-01-10 PROCEDURE — 3008F BODY MASS INDEX DOCD: CPT | Mod: CPTII,S$GLB,, | Performed by: NURSE PRACTITIONER

## 2024-01-10 RX ORDER — HYDROCODONE BITARTRATE AND ACETAMINOPHEN 5; 325 MG/1; MG/1
1 TABLET ORAL EVERY 12 HOURS PRN
Qty: 60 TABLET | Refills: 0 | Status: CANCELLED | OUTPATIENT
Start: 2024-01-10

## 2024-01-10 RX ORDER — CYCLOBENZAPRINE HCL 10 MG
10 TABLET ORAL 3 TIMES DAILY PRN
Qty: 90 TABLET | Refills: 0 | Status: CANCELLED | OUTPATIENT
Start: 2024-01-10

## 2024-01-10 RX ORDER — PRAVASTATIN SODIUM 40 MG/1
40 TABLET ORAL DAILY
Qty: 90 TABLET | Refills: 1 | Status: SHIPPED | OUTPATIENT
Start: 2024-01-10 | End: 2024-07-08

## 2024-01-10 RX ORDER — HYDROCODONE BITARTRATE AND ACETAMINOPHEN 5; 325 MG/1; MG/1
1 TABLET ORAL EVERY 12 HOURS PRN
Qty: 60 TABLET | Refills: 0 | Status: SHIPPED | OUTPATIENT
Start: 2024-01-10 | End: 2024-02-14 | Stop reason: SDUPTHER

## 2024-01-10 RX ORDER — AMLODIPINE BESYLATE 5 MG/1
5 TABLET ORAL DAILY
Qty: 90 TABLET | Refills: 1 | Status: SHIPPED | OUTPATIENT
Start: 2024-01-10

## 2024-01-10 RX ORDER — PANTOPRAZOLE SODIUM 40 MG/1
40 TABLET, DELAYED RELEASE ORAL DAILY
Qty: 90 TABLET | Refills: 1 | Status: SHIPPED | OUTPATIENT
Start: 2024-01-10

## 2024-01-10 NOTE — PROGRESS NOTES
SUBJECTIVE:    Patient ID: Jessica Mitchell is a 63 y.o. female.    Chief Complaint: Follow-up (Brought bottles/ pt needs refills/pt states she been sneezing have a headache and runny nose for 2 days)      63year old female presents for check up. She is treated for anxiety , hypertension, hyperlipidemia, gerd, constipation, arthritis, history of dvt, and coagulation disorder. . currently Works cleaning houses. Takes eliquis daily. Has been out of norco for some time. Takes prn for back pain.  Bp in office is well controlled.  Does not check at home. Last labs 5/23. Trying to get insurance straight with  job.         No visits with results within 6 Month(s) from this visit.   Latest known visit with results is:   Refill on 01/29/2023   Component Date Value Ref Range Status    Color, UA 05/17/2023 YELLOW  YELLOW Final    Appearance, UA 05/17/2023 CLEAR  CLEAR Final    Specific Gravity, UA 05/17/2023 1.009  1.001 - 1.035 Final    pH, UA 05/17/2023 6.0  5.0 - 8.0 Final    Glucose, UA 05/17/2023 NEGATIVE  NEGATIVE Final    Bilirubin, UA 05/17/2023 NEGATIVE  NEGATIVE Final    Ketones, UA 05/17/2023 NEGATIVE  NEGATIVE Final    Occult Blood UA 05/17/2023 NEGATIVE  NEGATIVE Final    Protein, UA 05/17/2023 NEGATIVE  NEGATIVE Final    Nitrite, UA 05/17/2023 NEGATIVE  NEGATIVE Final    Leukocytes, UA 05/17/2023 TRACE (A)  NEGATIVE Final    WBC Casts, UA 05/17/2023 NONE SEEN  < OR = 5 /HPF Final    RBC Casts, UA 05/17/2023 NONE SEEN  < OR = 2 /HPF Final    Squam Epithel, UA 05/17/2023 0-5  < OR = 5 /HPF Final    Bacteria, UA 05/17/2023 NONE SEEN  NONE SEEN /HPF Final    Hyaline Casts, UA 05/17/2023 NONE SEEN  NONE SEEN /LPF Final    Service Cmt: 05/17/2023    Final    Reflexive Urine Culture 05/17/2023    Final    Urine Culture, Routine 05/17/2023    Final    TSH w/reflex to FT4 05/17/2023 1.36  0.40 - 4.50 mIU/L Final    Cholesterol 05/17/2023 215 (H)  <200 mg/dL Final    HDL 05/17/2023 51  > OR = 50 mg/dL Final     Triglycerides 05/17/2023 142  <150 mg/dL Final    LDL Cholesterol 05/17/2023 137 (H)  mg/dL (calc) Final    HDL/Cholesterol Ratio 05/17/2023 4.2  <5.0 (calc) Final    Non HDL Chol. (LDL+VLDL) 05/17/2023 164 (H)  <130 mg/dL (calc) Final    Glucose 05/17/2023 88  65 - 99 mg/dL Final    BUN 05/17/2023 9  7 - 25 mg/dL Final    Creatinine 05/17/2023 0.76  0.50 - 1.05 mg/dL Final    eGFR 05/17/2023 89  > OR = 60 mL/min/1.73m2 Final    BUN/Creatinine Ratio 05/17/2023 NOT APPLICABLE  6 - 22 (calc) Final    Sodium 05/17/2023 140  135 - 146 mmol/L Final    Potassium 05/17/2023 4.4  3.5 - 5.3 mmol/L Final    Chloride 05/17/2023 103  98 - 110 mmol/L Final    CO2 05/17/2023 29  20 - 32 mmol/L Final    Calcium 05/17/2023 9.2  8.6 - 10.4 mg/dL Final    Total Protein 05/17/2023 7.0  6.1 - 8.1 g/dL Final    Albumin 05/17/2023 4.0  3.6 - 5.1 g/dL Final    Globulin, Total 05/17/2023 3.0  1.9 - 3.7 g/dL (calc) Final    Albumin/Globulin Ratio 05/17/2023 1.3  1.0 - 2.5 (calc) Final    Total Bilirubin 05/17/2023 0.4  0.2 - 1.2 mg/dL Final    Alkaline Phosphatase 05/17/2023 71  37 - 153 U/L Final    AST 05/17/2023 19  10 - 35 U/L Final    ALT 05/17/2023 22  6 - 29 U/L Final    WBC 05/17/2023 6.1  3.8 - 10.8 Thousand/uL Final    RBC 05/17/2023 5.15 (H)  3.80 - 5.10 Million/uL Final    Hemoglobin 05/17/2023 14.7  11.7 - 15.5 g/dL Final    Hematocrit 05/17/2023 44.7  35.0 - 45.0 % Final    MCV 05/17/2023 86.8  80.0 - 100.0 fL Final    MCH 05/17/2023 28.5  27.0 - 33.0 pg Final    MCHC 05/17/2023 32.9  32.0 - 36.0 g/dL Final    RDW 05/17/2023 12.2  11.0 - 15.0 % Final    Platelets 05/17/2023 265  140 - 400 Thousand/uL Final    MPV 05/17/2023 9.9  7.5 - 12.5 fL Final    Neutrophils, Abs 05/17/2023 3,160  1,500 - 7,800 cells/uL Final    Lymph # 05/17/2023 2,379  850 - 3,900 cells/uL Final    Mono # 05/17/2023 366  200 - 950 cells/uL Final    Eos # 05/17/2023 122  15 - 500 cells/uL Final    Baso # 05/17/2023 73  0 - 200 cells/uL Final     Neutrophils Relative 05/17/2023 51.8  % Final    Lymph % 05/17/2023 39.0  % Final    Mono % 05/17/2023 6.0  % Final    Eosinophil % 05/17/2023 2.0  % Final    Basophil % 05/17/2023 1.2  % Final    Creatinine, Urine 05/17/2023 81  20 - 275 mg/dL Final    Microalb, Ur 05/17/2023 0.6  See Note: mg/dL Final    Microalb/Creat Ratio 05/17/2023 7  <30 mcg/mg creat Final       Past Medical History:   Diagnosis Date    Adrenal tumor     DVT (deep venous thrombosis) 2012    Hiatal hernia     HTN (hypertension)     Multiple thyroid nodules     two    Stomach ulcer     Stroke     at 31 years old     Social History     Socioeconomic History    Marital status:    Tobacco Use    Smoking status: Former     Passive exposure: Past    Smokeless tobacco: Never   Substance and Sexual Activity    Alcohol use: Yes     Alcohol/week: 0.0 standard drinks of alcohol     Comment: socially    Drug use: No     Social Determinants of Health     Financial Resource Strain: High Risk (1/8/2024)    Overall Financial Resource Strain (CARDIA)     Difficulty of Paying Living Expenses: Very hard   Food Insecurity: Food Insecurity Present (1/8/2024)    Hunger Vital Sign     Worried About Running Out of Food in the Last Year: Often true     Ran Out of Food in the Last Year: Sometimes true   Transportation Needs: No Transportation Needs (1/8/2024)    PRAPARE - Transportation     Lack of Transportation (Medical): No     Lack of Transportation (Non-Medical): No   Physical Activity: Inactive (1/8/2024)    Exercise Vital Sign     Days of Exercise per Week: 0 days     Minutes of Exercise per Session: 0 min   Stress: Stress Concern Present (1/8/2024)    [employer] - Occupational Stress Questionnaire     Feeling of Stress : Very much   Social Connections: Unknown (1/8/2024)    Social Connection and Isolation Panel [NHANES]     Frequency of Communication with Friends and Family: Twice a week     Frequency of Social Gatherings  with Friends and Family: Twice a week     Active Member of Clubs or Organizations: No     Attends Club or Organization Meetings: Patient declined     Marital Status:    Housing Stability: Low Risk  (1/8/2024)    Housing Stability Vital Sign     Unable to Pay for Housing in the Last Year: No     Number of Places Lived in the Last Year: 1     Unstable Housing in the Last Year: No     Past Surgical History:   Procedure Laterality Date    Bilatweral Tubal ligation      ESOPHAGEAL DILATION       History reviewed. No pertinent family history.    Tests to Keep You Healthy    Mammogram: ORDERED BUT NOT SCHEDULED  Colon Cancer Screening: DUE  Cervical Cancer Screening: DUE  Last Blood Pressure <= 139/89 (5/17/2023): Yes      Review of patient's allergies indicates:   Allergen Reactions    Codeine Itching    Codeine sulfate      Other reaction(s): Unknown    Lisinopril Other (See Comments)     cough  Other reaction(s): Unknown       Current Outpatient Medications:     linaCLOtide (LINZESS) 145 mcg Cap capsule, Take 145 mcg by mouth once daily., Disp: , Rfl:     amLODIPine (NORVASC) 5 MG tablet, Take 1 tablet (5 mg total) by mouth once daily., Disp: 90 tablet, Rfl: 1    apixaban (ELIQUIS) 5 mg Tab, Take 1 tablet (5 mg total) by mouth 2 (two) times daily., Disp: 180 tablet, Rfl: 2    cyclobenzaprine (FLEXERIL) 10 MG tablet, Take 1 tablet (10 mg total) by mouth 3 (three) times daily as needed for Muscle spasms., Disp: 90 tablet, Rfl: 0    HYDROcodone-acetaminophen (NORCO) 5-325 mg per tablet, Take 1 tablet by mouth every 12 (twelve) hours as needed for Pain., Disp: 60 tablet, Rfl: 0    pantoprazole (PROTONIX) 40 MG tablet, Take 1 tablet (40 mg total) by mouth once daily., Disp: 90 tablet, Rfl: 1    pravastatin (PRAVACHOL) 40 MG tablet, Take 1 tablet (40 mg total) by mouth once daily., Disp: 90 tablet, Rfl: 1    Review of Systems   Constitutional:  Negative for activity change and unexpected weight change.   HENT:   Negative for hearing loss, rhinorrhea and trouble swallowing.    Eyes:  Negative for discharge and visual disturbance.   Respiratory:  Negative for chest tightness and wheezing.    Cardiovascular:  Negative for chest pain and palpitations.   Gastrointestinal:  Negative for blood in stool, constipation, diarrhea and vomiting.   Endocrine: Negative for polydipsia and polyuria.   Genitourinary:  Negative for difficulty urinating, dysuria, hematuria and menstrual problem.   Musculoskeletal:  Positive for back pain. Negative for arthralgias, joint swelling and neck pain.   Neurological:  Negative for weakness and headaches.   Psychiatric/Behavioral:  Negative for confusion and dysphoric mood.           Objective:      Vitals:    01/10/24 1012   Pulse: 79   SpO2: 98%   Weight: 93 kg (205 lb)     Physical Exam  Vitals and nursing note reviewed.   Constitutional:       General: She is not in acute distress.     Appearance: Normal appearance. She is well-developed.   HENT:      Head: Normocephalic.      Right Ear: External ear normal.      Left Ear: External ear normal.   Eyes:      Conjunctiva/sclera: Conjunctivae normal.      Pupils: Pupils are equal, round, and reactive to light.   Neck:      Vascular: No JVD.   Cardiovascular:      Rate and Rhythm: Normal rate and regular rhythm.      Heart sounds: No murmur heard.  Pulmonary:      Effort: Pulmonary effort is normal.      Breath sounds: Normal breath sounds.   Abdominal:      General: Bowel sounds are normal.      Palpations: Abdomen is soft.   Musculoskeletal:         General: No deformity.      Cervical back: Normal range of motion and neck supple.      Lumbar back: Decreased range of motion. Negative right straight leg raise test and negative left straight leg raise test.   Lymphadenopathy:      Cervical: No cervical adenopathy.   Skin:     General: Skin is warm and dry.      Findings: No rash.   Neurological:      Mental Status: She is alert and oriented to person,  place, and time.      Gait: Gait normal.   Psychiatric:         Speech: Speech normal.         Behavior: Behavior normal.           Assessment:       1. Long term (current) use of anticoagulants    2. Coagulation disorder    3. Hypertension, unspecified type    4. Mixed hyperlipidemia *Well controlled   5. High risk medication use    6. Gastroesophageal reflux disease, unspecified whether esophagitis present    7. Chronic low back pain with sciatica, sciatica laterality unspecified, unspecified back pain laterality         Plan:       Long term (current) use of anticoagulants    Coagulation disorder  Comments:  continue eliquis  Orders:  -     apixaban (ELIQUIS) 5 mg Tab; Take 1 tablet (5 mg total) by mouth 2 (two) times daily.  Dispense: 180 tablet; Refill: 2    Hypertension, unspecified type  Comments:  continue norvasc  Orders:  -     amLODIPine (NORVASC) 5 MG tablet; Take 1 tablet (5 mg total) by mouth once daily.  Dispense: 90 tablet; Refill: 1    Mixed hyperlipidemia  Comments:  continue pravastatin  Orders:  -     pravastatin (PRAVACHOL) 40 MG tablet; Take 1 tablet (40 mg total) by mouth once daily.  Dispense: 90 tablet; Refill: 1    High risk medication use  -     pravastatin (PRAVACHOL) 40 MG tablet; Take 1 tablet (40 mg total) by mouth once daily.  Dispense: 90 tablet; Refill: 1    Gastroesophageal reflux disease, unspecified whether esophagitis present  Comments:  continue protonix  Orders:  -     pantoprazole (PROTONIX) 40 MG tablet; Take 1 tablet (40 mg total) by mouth once daily.  Dispense: 90 tablet; Refill: 1    Chronic low back pain with sciatica, sciatica laterality unspecified, unspecified back pain laterality  Comments:  norco. flexeril      Follow up in about 4 months (around 5/10/2024), or if symptoms worsen or fail to improve, for medication management.        1/28/2024 Angi Barrett

## 2024-01-11 ENCOUNTER — PATIENT MESSAGE (OUTPATIENT)
Dept: FAMILY MEDICINE | Facility: CLINIC | Age: 64
End: 2024-01-11
Payer: COMMERCIAL

## 2024-01-11 DIAGNOSIS — M54.50 LOW BACK PAIN, UNSPECIFIED BACK PAIN LATERALITY, UNSPECIFIED CHRONICITY, UNSPECIFIED WHETHER SCIATICA PRESENT: ICD-10-CM

## 2024-01-11 RX ORDER — CYCLOBENZAPRINE HCL 10 MG
10 TABLET ORAL 3 TIMES DAILY PRN
Qty: 90 TABLET | Refills: 0 | Status: SHIPPED | OUTPATIENT
Start: 2024-01-11

## 2024-01-11 NOTE — TELEPHONE ENCOUNTER
Spoke with pt and informed her that her Norco rx was sent in yesterday. She voiced understanding.

## 2024-01-13 ENCOUNTER — PATIENT MESSAGE (OUTPATIENT)
Dept: FAMILY MEDICINE | Facility: CLINIC | Age: 64
End: 2024-01-13
Payer: COMMERCIAL

## 2024-01-14 ENCOUNTER — PATIENT MESSAGE (OUTPATIENT)
Dept: FAMILY MEDICINE | Facility: CLINIC | Age: 64
End: 2024-01-14
Payer: COMMERCIAL

## 2024-02-14 DIAGNOSIS — M54.50 LOW BACK PAIN, UNSPECIFIED BACK PAIN LATERALITY, UNSPECIFIED CHRONICITY, UNSPECIFIED WHETHER SCIATICA PRESENT: ICD-10-CM

## 2024-02-14 RX ORDER — HYDROCODONE BITARTRATE AND ACETAMINOPHEN 5; 325 MG/1; MG/1
1 TABLET ORAL EVERY 12 HOURS PRN
Qty: 60 TABLET | Refills: 0 | Status: SHIPPED | OUTPATIENT
Start: 2024-02-15 | End: 2024-03-17 | Stop reason: SDUPTHER

## 2024-03-17 DIAGNOSIS — M54.50 LOW BACK PAIN, UNSPECIFIED BACK PAIN LATERALITY, UNSPECIFIED CHRONICITY, UNSPECIFIED WHETHER SCIATICA PRESENT: ICD-10-CM

## 2024-03-17 DIAGNOSIS — D68.9 COAGULATION DISORDER: ICD-10-CM

## 2024-03-18 RX ORDER — HYDROCODONE BITARTRATE AND ACETAMINOPHEN 5; 325 MG/1; MG/1
1 TABLET ORAL EVERY 12 HOURS PRN
Qty: 60 TABLET | Refills: 0 | Status: SHIPPED | OUTPATIENT
Start: 2024-03-25 | End: 2024-05-16 | Stop reason: SDUPTHER

## 2024-03-18 RX ORDER — CYCLOBENZAPRINE HCL 10 MG
10 TABLET ORAL 3 TIMES DAILY PRN
Qty: 90 TABLET | Refills: 0 | Status: SHIPPED | OUTPATIENT
Start: 2024-03-18 | End: 2024-04-27 | Stop reason: SDUPTHER

## 2024-03-20 ENCOUNTER — PATIENT MESSAGE (OUTPATIENT)
Dept: FAMILY MEDICINE | Facility: CLINIC | Age: 64
End: 2024-03-20
Payer: COMMERCIAL

## 2024-03-28 ENCOUNTER — PATIENT MESSAGE (OUTPATIENT)
Dept: ADMINISTRATIVE | Facility: HOSPITAL | Age: 64
End: 2024-03-28
Payer: COMMERCIAL

## 2024-04-19 ENCOUNTER — PATIENT MESSAGE (OUTPATIENT)
Dept: ADMINISTRATIVE | Facility: HOSPITAL | Age: 64
End: 2024-04-19
Payer: COMMERCIAL

## 2024-04-27 DIAGNOSIS — M54.50 LOW BACK PAIN, UNSPECIFIED BACK PAIN LATERALITY, UNSPECIFIED CHRONICITY, UNSPECIFIED WHETHER SCIATICA PRESENT: ICD-10-CM

## 2024-04-29 RX ORDER — CYCLOBENZAPRINE HCL 10 MG
10 TABLET ORAL 3 TIMES DAILY PRN
Qty: 90 TABLET | Refills: 0 | Status: SHIPPED | OUTPATIENT
Start: 2024-04-29 | End: 2024-05-13 | Stop reason: SDUPTHER

## 2024-05-06 ENCOUNTER — TELEPHONE (OUTPATIENT)
Dept: FAMILY MEDICINE | Facility: CLINIC | Age: 64
End: 2024-05-06
Payer: COMMERCIAL

## 2024-05-06 DIAGNOSIS — I10 HYPERTENSION, UNSPECIFIED TYPE: Primary | ICD-10-CM

## 2024-05-06 DIAGNOSIS — Z79.899 ENCOUNTER FOR LONG-TERM (CURRENT) USE OF OTHER MEDICATIONS: ICD-10-CM

## 2024-05-06 DIAGNOSIS — E78.2 MIXED HYPERLIPIDEMIA: ICD-10-CM

## 2024-05-06 DIAGNOSIS — Z00.00 ROUTINE GENERAL MEDICAL EXAMINATION AT A HEALTH CARE FACILITY: ICD-10-CM

## 2024-05-10 ENCOUNTER — PATIENT MESSAGE (OUTPATIENT)
Dept: FAMILY MEDICINE | Facility: CLINIC | Age: 64
End: 2024-05-10
Payer: COMMERCIAL

## 2024-05-13 ENCOUNTER — OFFICE VISIT (OUTPATIENT)
Dept: FAMILY MEDICINE | Facility: CLINIC | Age: 64
End: 2024-05-13
Payer: COMMERCIAL

## 2024-05-13 ENCOUNTER — PATIENT MESSAGE (OUTPATIENT)
Dept: FAMILY MEDICINE | Facility: CLINIC | Age: 64
End: 2024-05-13

## 2024-05-13 VITALS
BODY MASS INDEX: 35.17 KG/M2 | SYSTOLIC BLOOD PRESSURE: 124 MMHG | DIASTOLIC BLOOD PRESSURE: 80 MMHG | HEART RATE: 85 BPM | HEIGHT: 64 IN | OXYGEN SATURATION: 98 % | WEIGHT: 206 LBS

## 2024-05-13 DIAGNOSIS — K21.00 GASTROESOPHAGEAL REFLUX DISEASE WITH ESOPHAGITIS WITHOUT HEMORRHAGE: ICD-10-CM

## 2024-05-13 DIAGNOSIS — I10 PRIMARY HYPERTENSION: ICD-10-CM

## 2024-05-13 DIAGNOSIS — Z79.01 LONG TERM (CURRENT) USE OF ANTICOAGULANTS: ICD-10-CM

## 2024-05-13 DIAGNOSIS — M54.50 LOW BACK PAIN, UNSPECIFIED BACK PAIN LATERALITY, UNSPECIFIED CHRONICITY, UNSPECIFIED WHETHER SCIATICA PRESENT: ICD-10-CM

## 2024-05-13 DIAGNOSIS — B35.1 TOENAIL FUNGUS: Primary | ICD-10-CM

## 2024-05-13 DIAGNOSIS — K59.00 CONSTIPATION, UNSPECIFIED CONSTIPATION TYPE: Primary | ICD-10-CM

## 2024-05-13 DIAGNOSIS — D68.9 COAGULATION DISORDER: ICD-10-CM

## 2024-05-13 DIAGNOSIS — E78.2 MIXED HYPERLIPIDEMIA: ICD-10-CM

## 2024-05-13 PROCEDURE — 99214 OFFICE O/P EST MOD 30 MIN: CPT | Mod: S$GLB,,, | Performed by: NURSE PRACTITIONER

## 2024-05-13 PROCEDURE — 1160F RVW MEDS BY RX/DR IN RCRD: CPT | Mod: CPTII,S$GLB,, | Performed by: NURSE PRACTITIONER

## 2024-05-13 PROCEDURE — 3079F DIAST BP 80-89 MM HG: CPT | Mod: CPTII,S$GLB,, | Performed by: NURSE PRACTITIONER

## 2024-05-13 PROCEDURE — 3074F SYST BP LT 130 MM HG: CPT | Mod: CPTII,S$GLB,, | Performed by: NURSE PRACTITIONER

## 2024-05-13 PROCEDURE — 3008F BODY MASS INDEX DOCD: CPT | Mod: CPTII,S$GLB,, | Performed by: NURSE PRACTITIONER

## 2024-05-13 PROCEDURE — 1159F MED LIST DOCD IN RCRD: CPT | Mod: CPTII,S$GLB,, | Performed by: NURSE PRACTITIONER

## 2024-05-13 RX ORDER — DULOXETIN HYDROCHLORIDE 30 MG/1
30 CAPSULE, DELAYED RELEASE ORAL DAILY
Qty: 90 CAPSULE | Refills: 1 | Status: SHIPPED | OUTPATIENT
Start: 2024-05-13 | End: 2024-06-12

## 2024-05-13 RX ORDER — CYCLOBENZAPRINE HCL 10 MG
10 TABLET ORAL 3 TIMES DAILY PRN
Qty: 90 TABLET | Refills: 0 | Status: SHIPPED | OUTPATIENT
Start: 2024-05-13

## 2024-05-13 RX ORDER — ASPIRIN 81 MG/1
81 TABLET ORAL DAILY
COMMUNITY

## 2024-05-14 LAB
ALBUMIN SERPL-MCNC: 4.2 G/DL (ref 3.6–5.1)
ALBUMIN/GLOB SERPL: 1.4 (CALC) (ref 1–2.5)
ALP SERPL-CCNC: 74 U/L (ref 37–153)
ALT SERPL-CCNC: 33 U/L (ref 6–29)
AST SERPL-CCNC: 27 U/L (ref 10–35)
BASOPHILS # BLD AUTO: 63 CELLS/UL (ref 0–200)
BASOPHILS NFR BLD AUTO: 0.9 %
BILIRUB SERPL-MCNC: 0.5 MG/DL (ref 0.2–1.2)
BUN SERPL-MCNC: 11 MG/DL (ref 7–25)
BUN/CREAT SERPL: ABNORMAL (CALC) (ref 6–22)
CALCIUM SERPL-MCNC: 9.2 MG/DL (ref 8.6–10.4)
CHLORIDE SERPL-SCNC: 101 MMOL/L (ref 98–110)
CHOLEST SERPL-MCNC: 192 MG/DL
CHOLEST/HDLC SERPL: 3.3 (CALC)
CO2 SERPL-SCNC: 28 MMOL/L (ref 20–32)
CREAT SERPL-MCNC: 0.77 MG/DL (ref 0.5–1.05)
EGFR: 87 ML/MIN/1.73M2
EOSINOPHIL # BLD AUTO: 119 CELLS/UL (ref 15–500)
EOSINOPHIL NFR BLD AUTO: 1.7 %
ERYTHROCYTE [DISTWIDTH] IN BLOOD BY AUTOMATED COUNT: 12.9 % (ref 11–15)
GLOBULIN SER CALC-MCNC: 3.1 G/DL (CALC) (ref 1.9–3.7)
GLUCOSE SERPL-MCNC: 93 MG/DL (ref 65–99)
HCT VFR BLD AUTO: 48.3 % (ref 35–45)
HDLC SERPL-MCNC: 59 MG/DL
HGB BLD-MCNC: 15.7 G/DL (ref 11.7–15.5)
LDLC SERPL CALC-MCNC: 114 MG/DL (CALC)
LYMPHOCYTES # BLD AUTO: 2058 CELLS/UL (ref 850–3900)
LYMPHOCYTES NFR BLD AUTO: 29.4 %
MCH RBC QN AUTO: 28.5 PG (ref 27–33)
MCHC RBC AUTO-ENTMCNC: 32.5 G/DL (ref 32–36)
MCV RBC AUTO: 87.7 FL (ref 80–100)
MONOCYTES # BLD AUTO: 357 CELLS/UL (ref 200–950)
MONOCYTES NFR BLD AUTO: 5.1 %
NEUTROPHILS # BLD AUTO: 4403 CELLS/UL (ref 1500–7800)
NEUTROPHILS NFR BLD AUTO: 62.9 %
NONHDLC SERPL-MCNC: 133 MG/DL (CALC)
PLATELET # BLD AUTO: 320 THOUSAND/UL (ref 140–400)
PMV BLD REES-ECKER: 9.8 FL (ref 7.5–12.5)
POTASSIUM SERPL-SCNC: 4.4 MMOL/L (ref 3.5–5.3)
PROT SERPL-MCNC: 7.3 G/DL (ref 6.1–8.1)
RBC # BLD AUTO: 5.51 MILLION/UL (ref 3.8–5.1)
SODIUM SERPL-SCNC: 138 MMOL/L (ref 135–146)
TRIGL SERPL-MCNC: 89 MG/DL
TSH SERPL-ACNC: 0.97 MIU/L (ref 0.4–4.5)
WBC # BLD AUTO: 7 THOUSAND/UL (ref 3.8–10.8)

## 2024-05-15 RX ORDER — CICLOPIROX 80 MG/ML
SOLUTION TOPICAL NIGHTLY
Qty: 6.6 ML | Refills: 2 | Status: SHIPPED | OUTPATIENT
Start: 2024-05-15

## 2024-05-16 DIAGNOSIS — M54.50 LOW BACK PAIN, UNSPECIFIED BACK PAIN LATERALITY, UNSPECIFIED CHRONICITY, UNSPECIFIED WHETHER SCIATICA PRESENT: ICD-10-CM

## 2024-05-16 RX ORDER — HYDROCODONE BITARTRATE AND ACETAMINOPHEN 5; 325 MG/1; MG/1
1 TABLET ORAL EVERY 12 HOURS PRN
Qty: 60 TABLET | Refills: 0 | Status: SHIPPED | OUTPATIENT
Start: 2024-05-16 | End: 2024-06-19 | Stop reason: SDUPTHER

## 2024-05-28 ENCOUNTER — TELEPHONE (OUTPATIENT)
Dept: FAMILY MEDICINE | Facility: CLINIC | Age: 64
End: 2024-05-28
Payer: COMMERCIAL

## 2024-05-28 NOTE — PROGRESS NOTES
SUBJECTIVE:    Patient ID: Jessica Mitchell is a 63 y.o. female.    Chief Complaint: Follow-up (Bottles brought//Pt here for 4 mo follow up//declines mammo//Will schedule colo//discuss Cymbalta//discuss blood donation eligibility//JL)      63 year old female presents for check up.  currently Works cleaning houses. She is treated for anxiety , hypertension, chronic back pain, hyperlipidemia, gerd, constipation, arthritis, history of dvt, and coagulation disorder. Takes eliquis daily. Norco helps with back pain. Wants to discuss cymbalta. Some stressors. Has taken in the past. Plans to have labs today. .     Follow-up  Pertinent negatives include no abdominal pain, arthralgias, chest pain, chills, coughing, fever, headaches, nausea, rash, sore throat, vomiting or weakness.       Telephone on 05/06/2024   Component Date Value Ref Range Status    WBC 05/13/2024 7.0  3.8 - 10.8 Thousand/uL Final    RBC 05/13/2024 5.51 (H)  3.80 - 5.10 Million/uL Final    Hemoglobin 05/13/2024 15.7 (H)  11.7 - 15.5 g/dL Final    Hematocrit 05/13/2024 48.3 (H)  35.0 - 45.0 % Final    MCV 05/13/2024 87.7  80.0 - 100.0 fL Final    MCH 05/13/2024 28.5  27.0 - 33.0 pg Final    MCHC 05/13/2024 32.5  32.0 - 36.0 g/dL Final    RDW 05/13/2024 12.9  11.0 - 15.0 % Final    Platelets 05/13/2024 320  140 - 400 Thousand/uL Final    MPV 05/13/2024 9.8  7.5 - 12.5 fL Final    Neutrophils, Abs 05/13/2024 4,403  1,500 - 7,800 cells/uL Final    Lymph # 05/13/2024 2,058  850 - 3,900 cells/uL Final    Mono # 05/13/2024 357  200 - 950 cells/uL Final    Eos # 05/13/2024 119  15 - 500 cells/uL Final    Baso # 05/13/2024 63  0 - 200 cells/uL Final    Neutrophils Relative 05/13/2024 62.9  % Final    Lymph % 05/13/2024 29.4  % Final    Mono % 05/13/2024 5.1  % Final    Eosinophil % 05/13/2024 1.7  % Final    Basophil % 05/13/2024 0.9  % Final    Glucose 05/13/2024 93  65 - 99 mg/dL Final    BUN 05/13/2024 11  7 - 25 mg/dL Final    Creatinine 05/13/2024 0.77  0.50  - 1.05 mg/dL Final    eGFR 05/13/2024 87  > OR = 60 mL/min/1.73m2 Final    BUN/Creatinine Ratio 05/13/2024 SEE NOTE:  6 - 22 (calc) Final    Sodium 05/13/2024 138  135 - 146 mmol/L Final    Potassium 05/13/2024 4.4  3.5 - 5.3 mmol/L Final    Chloride 05/13/2024 101  98 - 110 mmol/L Final    CO2 05/13/2024 28  20 - 32 mmol/L Final    Calcium 05/13/2024 9.2  8.6 - 10.4 mg/dL Final    Total Protein 05/13/2024 7.3  6.1 - 8.1 g/dL Final    Albumin 05/13/2024 4.2  3.6 - 5.1 g/dL Final    Globulin, Total 05/13/2024 3.1  1.9 - 3.7 g/dL (calc) Final    Albumin/Globulin Ratio 05/13/2024 1.4  1.0 - 2.5 (calc) Final    Total Bilirubin 05/13/2024 0.5  0.2 - 1.2 mg/dL Final    Alkaline Phosphatase 05/13/2024 74  37 - 153 U/L Final    AST 05/13/2024 27  10 - 35 U/L Final    ALT 05/13/2024 33 (H)  6 - 29 U/L Final    Cholesterol 05/13/2024 192  <200 mg/dL Final    HDL 05/13/2024 59  > OR = 50 mg/dL Final    Triglycerides 05/13/2024 89  <150 mg/dL Final    LDL Cholesterol 05/13/2024 114 (H)  mg/dL (calc) Final    HDL/Cholesterol Ratio 05/13/2024 3.3  <5.0 (calc) Final    Non HDL Chol. (LDL+VLDL) 05/13/2024 133 (H)  <130 mg/dL (calc) Final    TSH w/reflex to FT4 05/13/2024 0.97  0.40 - 4.50 mIU/L Final       Past Medical History:   Diagnosis Date    Adrenal tumor     DVT (deep venous thrombosis) 2012    Hiatal hernia     HTN (hypertension)     Multiple thyroid nodules     two    Stomach ulcer     Stroke     at 31 years old     Social History     Socioeconomic History    Marital status:    Tobacco Use    Smoking status: Former     Passive exposure: Past    Smokeless tobacco: Never   Substance and Sexual Activity    Alcohol use: Yes     Alcohol/week: 0.0 standard drinks of alcohol     Comment: socially    Drug use: No     Social Determinants of Health     Financial Resource Strain: High Risk (1/8/2024)    Overall Financial Resource Strain (CARDIA)     Difficulty of Paying Living Expenses: Very hard   Food Insecurity: Food  Insecurity Present (1/8/2024)    Hunger Vital Sign     Worried About Running Out of Food in the Last Year: Often true     Ran Out of Food in the Last Year: Sometimes true   Transportation Needs: No Transportation Needs (1/8/2024)    PRAPARE - Transportation     Lack of Transportation (Medical): No     Lack of Transportation (Non-Medical): No   Physical Activity: Inactive (1/8/2024)    Exercise Vital Sign     Days of Exercise per Week: 0 days     Minutes of Exercise per Session: 0 min   Stress: Stress Concern Present (1/8/2024)    [employer] - Occupational Stress Questionnaire     Feeling of Stress : Very much   Housing Stability: Low Risk  (1/8/2024)    Housing Stability Vital Sign     Unable to Pay for Housing in the Last Year: No     Number of Places Lived in the Last Year: 1     Unstable Housing in the Last Year: No     Past Surgical History:   Procedure Laterality Date    Bilatweral Tubal ligation      ESOPHAGEAL DILATION       No family history on file.    Tests to Keep You Healthy    Mammogram: ORDERED BUT NOT SCHEDULED  Colon Cancer Screening: DUE  Cervical Cancer Screening: DUE  Last Blood Pressure <= 139/89 (5/13/2024): Yes      Review of patient's allergies indicates:   Allergen Reactions    Codeine Itching    Codeine sulfate      Other reaction(s): Unknown    Lisinopril Other (See Comments)     cough  Other reaction(s): Unknown       Current Outpatient Medications:     amLODIPine (NORVASC) 5 MG tablet, Take 1 tablet (5 mg total) by mouth once daily., Disp: 90 tablet, Rfl: 1    apixaban (ELIQUIS) 5 mg Tab, Take 1 tablet (5 mg total) by mouth 2 (two) times daily., Disp: 180 tablet, Rfl: 2    aspirin (ECOTRIN) 81 MG EC tablet, Take 81 mg by mouth once daily. Only takes when out of Eliquis, Disp: , Rfl:     linaCLOtide (LINZESS) 145 mcg Cap capsule, Take 145 mcg by mouth once daily., Disp: , Rfl:     pantoprazole (PROTONIX) 40 MG tablet, Take 1 tablet (40 mg total) by mouth once  "daily., Disp: 90 tablet, Rfl: 1    pravastatin (PRAVACHOL) 40 MG tablet, Take 1 tablet (40 mg total) by mouth once daily., Disp: 90 tablet, Rfl: 1    ciclopirox (PENLAC) 8 % Soln, Apply topically nightly., Disp: 6.6 mL, Rfl: 2    cyclobenzaprine (FLEXERIL) 10 MG tablet, Take 1 tablet (10 mg total) by mouth 3 (three) times daily as needed for Muscle spasms., Disp: 90 tablet, Rfl: 0    DULoxetine (CYMBALTA) 30 MG capsule, Take 1 capsule (30 mg total) by mouth once daily., Disp: 90 capsule, Rfl: 1    HYDROcodone-acetaminophen (NORCO) 5-325 mg per tablet, Take 1 tablet by mouth every 12 (twelve) hours as needed for Pain., Disp: 60 tablet, Rfl: 0    Review of Systems   Constitutional:  Negative for chills, fever and unexpected weight change.   HENT:  Negative for ear pain, rhinorrhea and sore throat.    Eyes:  Negative for pain and visual disturbance.   Respiratory:  Negative for cough and shortness of breath.    Cardiovascular:  Negative for chest pain, palpitations and leg swelling.   Gastrointestinal:  Negative for abdominal pain, diarrhea, nausea and vomiting.   Genitourinary:  Negative for difficulty urinating, hematuria and vaginal bleeding.   Musculoskeletal:  Positive for back pain. Negative for arthralgias.   Skin:  Negative for rash.   Neurological:  Negative for dizziness, weakness and headaches.   Psychiatric/Behavioral:  Negative for agitation and sleep disturbance. The patient is not nervous/anxious.           Objective:      Vitals:    05/13/24 1110   BP: 124/80   Pulse: 85   SpO2: 98%   Weight: 93.4 kg (206 lb)   Height: 5' 4" (1.626 m)     Physical Exam  Vitals and nursing note reviewed.   Constitutional:       General: She is not in acute distress.     Appearance: Normal appearance. She is well-developed. She is obese.   HENT:      Head: Normocephalic.      Right Ear: External ear normal.      Left Ear: External ear normal.   Eyes:      Conjunctiva/sclera: Conjunctivae normal.      Pupils: Pupils are " equal, round, and reactive to light.   Neck:      Vascular: No JVD.   Cardiovascular:      Rate and Rhythm: Normal rate and regular rhythm.      Heart sounds: No murmur heard.  Pulmonary:      Effort: Pulmonary effort is normal.      Breath sounds: Normal breath sounds.   Abdominal:      General: Bowel sounds are normal.      Palpations: Abdomen is soft.   Musculoskeletal:         General: No deformity.      Cervical back: Normal range of motion and neck supple.      Lumbar back: Decreased range of motion.   Lymphadenopathy:      Cervical: No cervical adenopathy.   Skin:     General: Skin is warm and dry.      Findings: No rash.   Neurological:      Mental Status: She is alert and oriented to person, place, and time.      Gait: Gait normal.   Psychiatric:         Speech: Speech normal.         Behavior: Behavior normal.           Assessment:       1. Constipation, unspecified constipation type    2. Low back pain, unspecified back pain laterality, unspecified chronicity, unspecified whether sciatica present    3. Coagulation disorder    4. Gastroesophageal reflux disease with esophagitis without hemorrhage    5. Primary hypertension    6. Long term (current) use of anticoagulants    7. Mixed hyperlipidemia         Plan:       Constipation, unspecified constipation type  Comments:  linzess    Low back pain, unspecified back pain laterality, unspecified chronicity, unspecified whether sciatica present  Comments:  continue norco  Orders:  -     cyclobenzaprine (FLEXERIL) 10 MG tablet; Take 1 tablet (10 mg total) by mouth 3 (three) times daily as needed for Muscle spasms.  Dispense: 90 tablet; Refill: 0    Coagulation disorder  Comments:  eliquis    Gastroesophageal reflux disease with esophagitis without hemorrhage  Comments:  protonix    Primary hypertension  Comments:  bp is controlled    Long term (current) use of anticoagulants    Mixed hyperlipidemia  Comments:  pravastatin    Other orders  -     DULoxetine  (CYMBALTA) 30 MG capsule; Take 1 capsule (30 mg total) by mouth once daily.  Dispense: 90 capsule; Refill: 1      Follow up in about 4 months (around 9/13/2024), or if symptoms worsen or fail to improve, for medication management.        5/28/2024 Angi Barrett

## 2024-05-28 NOTE — TELEPHONE ENCOUNTER
Spoke with pt in regards to recent lab results. Verbalized verbatim per Angi. Pt acknowledged understanding.

## 2024-05-28 NOTE — TELEPHONE ENCOUNTER
----- Message from Angi Barrett NP sent at 5/28/2024 12:06 AM CDT -----  Cholesterol has improved since last lab draw but still slightly elevated. Liver enzyme is elevated. This is likely due to fatty liver. (Seen on previous ultrasound) start low fat diet. Rest of labs ok.

## 2024-06-11 ENCOUNTER — PATIENT MESSAGE (OUTPATIENT)
Dept: FAMILY MEDICINE | Facility: CLINIC | Age: 64
End: 2024-06-11
Payer: COMMERCIAL

## 2024-06-12 RX ORDER — DULOXETIN HYDROCHLORIDE 30 MG/1
60 CAPSULE, DELAYED RELEASE ORAL DAILY
COMMUNITY

## 2024-06-19 DIAGNOSIS — M54.50 LOW BACK PAIN, UNSPECIFIED BACK PAIN LATERALITY, UNSPECIFIED CHRONICITY, UNSPECIFIED WHETHER SCIATICA PRESENT: ICD-10-CM

## 2024-06-19 RX ORDER — HYDROCODONE BITARTRATE AND ACETAMINOPHEN 5; 325 MG/1; MG/1
1 TABLET ORAL EVERY 12 HOURS PRN
Qty: 60 TABLET | Refills: 0 | Status: SHIPPED | OUTPATIENT
Start: 2024-06-19

## 2024-06-19 NOTE — TELEPHONE ENCOUNTER
Last office visit on 5/13/24 and upcoming on 11/13/24. Per  last filled on 5/18/24. Prescription pended for provider review.

## 2024-07-03 ENCOUNTER — PATIENT MESSAGE (OUTPATIENT)
Dept: FAMILY MEDICINE | Facility: CLINIC | Age: 64
End: 2024-07-03
Payer: COMMERCIAL

## 2024-07-06 ENCOUNTER — PATIENT MESSAGE (OUTPATIENT)
Dept: FAMILY MEDICINE | Facility: CLINIC | Age: 64
End: 2024-07-06
Payer: COMMERCIAL

## 2024-07-10 DIAGNOSIS — M54.50 LOW BACK PAIN, UNSPECIFIED BACK PAIN LATERALITY, UNSPECIFIED CHRONICITY, UNSPECIFIED WHETHER SCIATICA PRESENT: ICD-10-CM

## 2024-07-10 DIAGNOSIS — D68.9 COAGULATION DISORDER: ICD-10-CM

## 2024-07-10 RX ORDER — CYCLOBENZAPRINE HCL 10 MG
10 TABLET ORAL 3 TIMES DAILY PRN
Qty: 90 TABLET | Refills: 0 | Status: SHIPPED | OUTPATIENT
Start: 2024-07-10

## 2024-07-11 DIAGNOSIS — D68.9 COAGULATION DISORDER: ICD-10-CM

## 2024-07-20 ENCOUNTER — PATIENT MESSAGE (OUTPATIENT)
Dept: FAMILY MEDICINE | Facility: CLINIC | Age: 64
End: 2024-07-20
Payer: COMMERCIAL

## 2024-07-22 RX ORDER — DULOXETIN HYDROCHLORIDE 30 MG/1
60 CAPSULE, DELAYED RELEASE ORAL DAILY
Qty: 180 CAPSULE | Refills: 0 | Status: SHIPPED | OUTPATIENT
Start: 2024-07-22

## 2024-08-22 ENCOUNTER — PATIENT MESSAGE (OUTPATIENT)
Dept: FAMILY MEDICINE | Facility: CLINIC | Age: 64
End: 2024-08-22
Payer: COMMERCIAL

## 2024-08-22 DIAGNOSIS — D68.9 COAGULATION DISORDER: ICD-10-CM

## 2024-08-22 DIAGNOSIS — Z79.899 HIGH RISK MEDICATION USE: ICD-10-CM

## 2024-08-22 DIAGNOSIS — M54.50 LOW BACK PAIN, UNSPECIFIED BACK PAIN LATERALITY, UNSPECIFIED CHRONICITY, UNSPECIFIED WHETHER SCIATICA PRESENT: ICD-10-CM

## 2024-08-22 DIAGNOSIS — E78.2 MIXED HYPERLIPIDEMIA: ICD-10-CM

## 2024-08-22 DIAGNOSIS — K21.9 GASTROESOPHAGEAL REFLUX DISEASE, UNSPECIFIED WHETHER ESOPHAGITIS PRESENT: ICD-10-CM

## 2024-08-22 DIAGNOSIS — I10 HYPERTENSION, UNSPECIFIED TYPE: ICD-10-CM

## 2024-08-23 RX ORDER — CYCLOBENZAPRINE HCL 10 MG
10 TABLET ORAL 3 TIMES DAILY PRN
Qty: 90 TABLET | Refills: 0 | Status: SHIPPED | OUTPATIENT
Start: 2024-08-23

## 2024-08-23 RX ORDER — PANTOPRAZOLE SODIUM 40 MG/1
40 TABLET, DELAYED RELEASE ORAL DAILY
Qty: 90 TABLET | Refills: 1 | Status: SHIPPED | OUTPATIENT
Start: 2024-08-23

## 2024-08-23 RX ORDER — PRAVASTATIN SODIUM 40 MG/1
40 TABLET ORAL DAILY
Qty: 90 TABLET | Refills: 1 | Status: SHIPPED | OUTPATIENT
Start: 2024-08-23 | End: 2025-02-19

## 2024-08-23 RX ORDER — AMLODIPINE BESYLATE 5 MG/1
5 TABLET ORAL DAILY
Qty: 90 TABLET | Refills: 1 | Status: SHIPPED | OUTPATIENT
Start: 2024-08-23

## 2024-09-25 DIAGNOSIS — M54.50 LOW BACK PAIN, UNSPECIFIED BACK PAIN LATERALITY, UNSPECIFIED CHRONICITY, UNSPECIFIED WHETHER SCIATICA PRESENT: ICD-10-CM

## 2024-09-26 ENCOUNTER — PATIENT MESSAGE (OUTPATIENT)
Dept: FAMILY MEDICINE | Facility: CLINIC | Age: 64
End: 2024-09-26
Payer: COMMERCIAL

## 2024-09-26 RX ORDER — CYCLOBENZAPRINE HCL 10 MG
10 TABLET ORAL 3 TIMES DAILY PRN
Qty: 90 TABLET | Refills: 0 | Status: SHIPPED | OUTPATIENT
Start: 2024-09-26

## 2024-09-26 RX ORDER — HYDROCODONE BITARTRATE AND ACETAMINOPHEN 5; 325 MG/1; MG/1
1 TABLET ORAL EVERY 12 HOURS PRN
Qty: 60 TABLET | Refills: 0 | Status: SHIPPED | OUTPATIENT
Start: 2024-09-26

## 2024-10-08 ENCOUNTER — PATIENT MESSAGE (OUTPATIENT)
Dept: FAMILY MEDICINE | Facility: CLINIC | Age: 64
End: 2024-10-08
Payer: COMMERCIAL

## 2024-10-19 DIAGNOSIS — D68.9 COAGULATION DISORDER: ICD-10-CM

## 2024-10-28 RX ORDER — DULOXETIN HYDROCHLORIDE 30 MG/1
60 CAPSULE, DELAYED RELEASE ORAL DAILY
Qty: 180 CAPSULE | Refills: 0 | Status: SHIPPED | OUTPATIENT
Start: 2024-10-28 | End: 2024-10-31 | Stop reason: SDUPTHER

## 2024-10-31 ENCOUNTER — PATIENT MESSAGE (OUTPATIENT)
Dept: FAMILY MEDICINE | Facility: CLINIC | Age: 64
End: 2024-10-31
Payer: COMMERCIAL

## 2024-10-31 RX ORDER — DULOXETIN HYDROCHLORIDE 30 MG/1
60 CAPSULE, DELAYED RELEASE ORAL DAILY
Qty: 180 CAPSULE | Refills: 0 | Status: SHIPPED | OUTPATIENT
Start: 2024-10-31

## 2024-11-13 ENCOUNTER — OFFICE VISIT (OUTPATIENT)
Dept: FAMILY MEDICINE | Facility: CLINIC | Age: 64
End: 2024-11-13
Payer: COMMERCIAL

## 2024-11-13 VITALS
DIASTOLIC BLOOD PRESSURE: 68 MMHG | WEIGHT: 214.19 LBS | HEART RATE: 77 BPM | OXYGEN SATURATION: 99 % | BODY MASS INDEX: 36.57 KG/M2 | SYSTOLIC BLOOD PRESSURE: 128 MMHG | HEIGHT: 64 IN

## 2024-11-13 DIAGNOSIS — I10 HYPERTENSION, UNSPECIFIED TYPE: ICD-10-CM

## 2024-11-13 DIAGNOSIS — Z79.01 LONG TERM (CURRENT) USE OF ANTICOAGULANTS: ICD-10-CM

## 2024-11-13 DIAGNOSIS — Z63.79 STRESS DUE TO ILLNESS OF FAMILY MEMBER: ICD-10-CM

## 2024-11-13 DIAGNOSIS — M54.50 LOW BACK PAIN, UNSPECIFIED BACK PAIN LATERALITY, UNSPECIFIED CHRONICITY, UNSPECIFIED WHETHER SCIATICA PRESENT: ICD-10-CM

## 2024-11-13 DIAGNOSIS — D68.9 COAGULATION DISORDER: Primary | ICD-10-CM

## 2024-11-13 DIAGNOSIS — Z79.899 HIGH RISK MEDICATION USE: ICD-10-CM

## 2024-11-13 DIAGNOSIS — K21.9 GASTROESOPHAGEAL REFLUX DISEASE, UNSPECIFIED WHETHER ESOPHAGITIS PRESENT: ICD-10-CM

## 2024-11-13 DIAGNOSIS — E78.2 MIXED HYPERLIPIDEMIA: ICD-10-CM

## 2024-11-13 DIAGNOSIS — F41.1 GENERALIZED ANXIETY DISORDER: ICD-10-CM

## 2024-11-13 DIAGNOSIS — Z12.11 ENCOUNTER FOR SCREENING COLONOSCOPY: ICD-10-CM

## 2024-11-13 PROCEDURE — 1160F RVW MEDS BY RX/DR IN RCRD: CPT | Mod: CPTII,S$GLB,, | Performed by: NURSE PRACTITIONER

## 2024-11-13 PROCEDURE — 99214 OFFICE O/P EST MOD 30 MIN: CPT | Mod: S$GLB,,, | Performed by: NURSE PRACTITIONER

## 2024-11-13 PROCEDURE — 1159F MED LIST DOCD IN RCRD: CPT | Mod: CPTII,S$GLB,, | Performed by: NURSE PRACTITIONER

## 2024-11-13 PROCEDURE — 3008F BODY MASS INDEX DOCD: CPT | Mod: CPTII,S$GLB,, | Performed by: NURSE PRACTITIONER

## 2024-11-13 PROCEDURE — 3074F SYST BP LT 130 MM HG: CPT | Mod: CPTII,S$GLB,, | Performed by: NURSE PRACTITIONER

## 2024-11-13 PROCEDURE — 3078F DIAST BP <80 MM HG: CPT | Mod: CPTII,S$GLB,, | Performed by: NURSE PRACTITIONER

## 2024-11-18 DIAGNOSIS — M54.50 LOW BACK PAIN, UNSPECIFIED BACK PAIN LATERALITY, UNSPECIFIED CHRONICITY, UNSPECIFIED WHETHER SCIATICA PRESENT: ICD-10-CM

## 2024-11-18 RX ORDER — HYDROCODONE BITARTRATE AND ACETAMINOPHEN 5; 325 MG/1; MG/1
1 TABLET ORAL EVERY 12 HOURS PRN
Qty: 60 TABLET | Refills: 0 | Status: SHIPPED | OUTPATIENT
Start: 2024-11-29

## 2024-11-18 RX ORDER — CYCLOBENZAPRINE HCL 10 MG
10 TABLET ORAL 3 TIMES DAILY PRN
Qty: 90 TABLET | Refills: 0 | Status: SHIPPED | OUTPATIENT
Start: 2024-11-18

## 2024-11-18 NOTE — PROGRESS NOTES
SUBJECTIVE:    Patient ID: Jessica Mitchell is a 64 y.o. female.    Chief Complaint: Follow-up (Bottles brought//Pt is here for a 6 month follow up//Declined Pap Smear//Declined Mammogram//Will call Dr Munoz to schedule Colonoscopy//KE)    History of Present Illness    CHIEF COMPLAINT:  Jessica presents for a follow-up visit and medication refills.    HPI:  She reports stable knee condition, which is considered a positive sign. Jessica's blood pressure is well-controlled with current medication.    Jessica's , Andi, has been diagnosed with cirrhosis of the liver and hepatitis C, causing some stress    MEDICATIONS:  Jessica is on Flexeril and requires a refill. She is also on blood pressure medication with one week remaining and needs a refill.       ROS:  General: -fever, -chills, -fatigue, -weight gain, -weight loss  Eyes: -vision changes, -redness, -discharge  ENT: -ear pain, -nasal congestion, -sore throat  Cardiovascular: -chest pain, -palpitations, -lower extremity edema  Respiratory: -cough, -shortness of breath  Gastrointestinal: -abdominal pain, -nausea, -vomiting, -diarrhea, -constipation, -blood in stool  Genitourinary: -dysuria, -hematuria, -frequency  Musculoskeletal: -joint pain, -muscle pain  Skin: -rash, -lesion  Neurological: -headache, -dizziness, -numbness, -tingling  Psychiatric: -anxiety, -depression, -sleep difficulty         No visits with results within 6 Month(s) from this visit.   Latest known visit with results is:   Telephone on 05/06/2024   Component Date Value Ref Range Status    WBC 05/13/2024 7.0  3.8 - 10.8 Thousand/uL Final    RBC 05/13/2024 5.51 (H)  3.80 - 5.10 Million/uL Final    Hemoglobin 05/13/2024 15.7 (H)  11.7 - 15.5 g/dL Final    Hematocrit 05/13/2024 48.3 (H)  35.0 - 45.0 % Final    MCV 05/13/2024 87.7  80.0 - 100.0 fL Final    MCH 05/13/2024 28.5  27.0 - 33.0 pg Final    MCHC 05/13/2024 32.5  32.0 - 36.0 g/dL Final    RDW 05/13/2024 12.9  11.0 - 15.0 % Final     Platelets 05/13/2024 320  140 - 400 Thousand/uL Final    MPV 05/13/2024 9.8  7.5 - 12.5 fL Final    Neutrophils, Abs 05/13/2024 4,403  1,500 - 7,800 cells/uL Final    Lymph # 05/13/2024 2,058  850 - 3,900 cells/uL Final    Mono # 05/13/2024 357  200 - 950 cells/uL Final    Eos # 05/13/2024 119  15 - 500 cells/uL Final    Baso # 05/13/2024 63  0 - 200 cells/uL Final    Neutrophils Relative 05/13/2024 62.9  % Final    Lymph % 05/13/2024 29.4  % Final    Mono % 05/13/2024 5.1  % Final    Eosinophil % 05/13/2024 1.7  % Final    Basophil % 05/13/2024 0.9  % Final    Glucose 05/13/2024 93  65 - 99 mg/dL Final    BUN 05/13/2024 11  7 - 25 mg/dL Final    Creatinine 05/13/2024 0.77  0.50 - 1.05 mg/dL Final    eGFR 05/13/2024 87  > OR = 60 mL/min/1.73m2 Final    BUN/Creatinine Ratio 05/13/2024 SEE NOTE:  6 - 22 (calc) Final    Sodium 05/13/2024 138  135 - 146 mmol/L Final    Potassium 05/13/2024 4.4  3.5 - 5.3 mmol/L Final    Chloride 05/13/2024 101  98 - 110 mmol/L Final    CO2 05/13/2024 28  20 - 32 mmol/L Final    Calcium 05/13/2024 9.2  8.6 - 10.4 mg/dL Final    Total Protein 05/13/2024 7.3  6.1 - 8.1 g/dL Final    Albumin 05/13/2024 4.2  3.6 - 5.1 g/dL Final    Globulin, Total 05/13/2024 3.1  1.9 - 3.7 g/dL (calc) Final    Albumin/Globulin Ratio 05/13/2024 1.4  1.0 - 2.5 (calc) Final    Total Bilirubin 05/13/2024 0.5  0.2 - 1.2 mg/dL Final    Alkaline Phosphatase 05/13/2024 74  37 - 153 U/L Final    AST 05/13/2024 27  10 - 35 U/L Final    ALT 05/13/2024 33 (H)  6 - 29 U/L Final    Cholesterol 05/13/2024 192  <200 mg/dL Final    HDL 05/13/2024 59  > OR = 50 mg/dL Final    Triglycerides 05/13/2024 89  <150 mg/dL Final    LDL Cholesterol 05/13/2024 114 (H)  mg/dL (calc) Final    HDL/Cholesterol Ratio 05/13/2024 3.3  <5.0 (calc) Final    Non HDL Chol. (LDL+VLDL) 05/13/2024 133 (H)  <130 mg/dL (calc) Final    TSH w/reflex to FT4 05/13/2024 0.97  0.40 - 4.50 mIU/L Final       Past Medical History:   Diagnosis Date    Adrenal  tumor     DVT (deep venous thrombosis) 2012    Hiatal hernia     HTN (hypertension)     Multiple thyroid nodules     two    Stomach ulcer     Stroke     at 31 years old     Social History     Socioeconomic History    Marital status:    Tobacco Use    Smoking status: Former     Passive exposure: Past    Smokeless tobacco: Never   Substance and Sexual Activity    Alcohol use: Yes     Alcohol/week: 0.0 standard drinks of alcohol     Comment: socially    Drug use: No     Social Drivers of Health     Financial Resource Strain: High Risk (1/8/2024)    Overall Financial Resource Strain (CARDIA)     Difficulty of Paying Living Expenses: Very hard   Food Insecurity: Food Insecurity Present (1/8/2024)    Hunger Vital Sign     Worried About Running Out of Food in the Last Year: Often true     Ran Out of Food in the Last Year: Sometimes true   Transportation Needs: No Transportation Needs (1/8/2024)    PRAPARE - Transportation     Lack of Transportation (Medical): No     Lack of Transportation (Non-Medical): No   Physical Activity: Inactive (1/8/2024)    Exercise Vital Sign     Days of Exercise per Week: 0 days     Minutes of Exercise per Session: 0 min   Stress: Stress Concern Present (1/8/2024)    [employer] - Occupational Stress Questionnaire     Feeling of Stress : Very much   Housing Stability: Low Risk  (1/8/2024)    Housing Stability Vital Sign     Unable to Pay for Housing in the Last Year: No     Number of Places Lived in the Last Year: 1     Unstable Housing in the Last Year: No     Past Surgical History:   Procedure Laterality Date    Bilatweral Tubal ligation      ESOPHAGEAL DILATION       No family history on file.    Tests to Keep You Healthy    Mammogram: ORDERED BUT NOT SCHEDULED  Colon Cancer Screening: DUE  Cervical Cancer Screening: DUE  Last Blood Pressure <= 139/89 (11/13/2024): Yes      Review of patient's allergies indicates:   Allergen Reactions    Codeine Itching     "Codeine sulfate      Other reaction(s): Unknown    Lisinopril Other (See Comments)     cough  Other reaction(s): Unknown       Current Outpatient Medications:     amLODIPine (NORVASC) 5 MG tablet, Take 1 tablet (5 mg total) by mouth once daily., Disp: 90 tablet, Rfl: 1    apixaban (ELIQUIS) 5 mg Tab, Take 1 tablet (5 mg total) by mouth 2 (two) times daily., Disp: 180 tablet, Rfl: 2    aspirin (ECOTRIN) 81 MG EC tablet, Take 81 mg by mouth once daily. Only takes when out of Eliquis, Disp: , Rfl:     ciclopirox (PENLAC) 8 % Soln, Apply topically nightly., Disp: 6.6 mL, Rfl: 2    DULoxetine (CYMBALTA) 30 MG capsule, Take 2 capsules (60 mg total) by mouth once daily., Disp: 180 capsule, Rfl: 0    DULoxetine (CYMBALTA) 30 MG capsule, Take 2 capsules (60 mg total) by mouth once daily., Disp: 180 capsule, Rfl: 0    HYDROcodone-acetaminophen (NORCO) 5-325 mg per tablet, Take 1 tablet by mouth every 12 (twelve) hours as needed for Pain., Disp: 60 tablet, Rfl: 0    pantoprazole (PROTONIX) 40 MG tablet, Take 1 tablet (40 mg total) by mouth once daily., Disp: 90 tablet, Rfl: 1    pravastatin (PRAVACHOL) 40 MG tablet, Take 1 tablet (40 mg total) by mouth once daily., Disp: 90 tablet, Rfl: 1    cyclobenzaprine (FLEXERIL) 10 MG tablet, Take 1 tablet (10 mg total) by mouth 3 (three) times daily as needed for Muscle spasms., Disp: 90 tablet, Rfl: 0    linaCLOtide (LINZESS) 145 mcg Cap capsule, Take 145 mcg by mouth as needed., Disp: , Rfl:     Objective:      Vitals:    11/13/24 1126   BP: 128/68   Pulse: 77   SpO2: 99%   Weight: 97.2 kg (214 lb 3.2 oz)   Height: 5' 4" (1.626 m)     Physical Exam    General: No acute distress. Well-developed. Well-nourished. obese  HENT: Normocephalic. Atraumatic. Nares patent. Moist oral mucosa.  Ears: Bilateral TMs clear. Bilateral EACs clear.  Cardiovascular: Regular rate. Regular rhythm. No murmurs. No rubs. No gallops. Normal S1, S2.  Respiratory: Normal respiratory effort. Clear to " auscultation bilaterally. No rales. No rhonchi. No wheezing.  Abdomen: Soft. Non-tender. Non-distended. Normoactive bowel sounds.  Musculoskeletal: No  obvious deformity.  Extremities: No lower extremity edema.  Neurological: Alert & oriented x3. No slurred speech. Normal gait.  Psychiatric: Normal mood. Normal affect. Good insight. Good judgment.  Skin: Warm. Dry. No rash.       Assessment:       Assessment & Plan    - Reviewed patient's blood pressure, noted to be within normal limits  MUSCLE SPASM:  Refilled Flexeril.    HYPERTENSION:  Refilled blood pressure medication for 1 more week.       Plan:       Coagulation disorder  Comments:  eliquis    Low back pain, unspecified back pain laterality, unspecified chronicity, unspecified whether sciatica present  Comments:  continue norco  Orders:  -     cyclobenzaprine (FLEXERIL) 10 MG tablet; Take 1 tablet (10 mg total) by mouth 3 (three) times daily as needed for Muscle spasms.  Dispense: 90 tablet; Refill: 0    Mixed hyperlipidemia  Comments:  pravastatin    Hypertension, unspecified type  Comments:  bp controlled    High risk medication use    Gastroesophageal reflux disease, unspecified whether esophagitis present  Comments:  protonix    Long term (current) use of anticoagulants    Generalized anxiety disorder  Comments:  cymbalta    Encounter for screening colonoscopy  -     Ambulatory referral/consult to Gastroenterology; Future; Expected date: 11/25/2024    Stress due to illness of family member      Follow up in about 6 months (around 5/13/2025), or if symptoms worsen or fail to improve, for medication management.        This note was generated with the assistance of ambient listening technology. Verbal consent was obtained by the patient and accompanying visitor(s) for the recording of patient appointment to facilitate this note. I attest to having reviewed and edited the generated note for accuracy, though some syntax or spelling errors may persist. Please  contact the author of this note for any clarification.      11/18/2024 Angi Barrett      Answers submitted by the patient for this visit:  Review of Systems Questionnaire (Submitted on 11/13/2024)  activity change: No  unexpected weight change: No  neck pain: No  hearing loss: No  rhinorrhea: No  trouble swallowing: No  eye discharge: No  visual disturbance: No  chest tightness: No  wheezing: No  chest pain: No  palpitations: No  blood in stool: No  constipation: No  vomiting: No  diarrhea: No  polydipsia: No  polyuria: No  difficulty urinating: No  hematuria: No  menstrual problem: No  dysuria: No  joint swelling: No  arthralgias: No  headaches: No  weakness: No  confusion: No  dysphoric mood: No

## 2024-11-18 NOTE — TELEPHONE ENCOUNTER
----- Message from Angi Barrett NP sent at 11/18/2024 12:24 AM CST -----  Norco needs to be set up for dr. Hull with fill date of 11/29/24

## 2024-11-30 DIAGNOSIS — M54.50 LOW BACK PAIN, UNSPECIFIED BACK PAIN LATERALITY, UNSPECIFIED CHRONICITY, UNSPECIFIED WHETHER SCIATICA PRESENT: ICD-10-CM

## 2024-12-02 RX ORDER — HYDROCODONE BITARTRATE AND ACETAMINOPHEN 5; 325 MG/1; MG/1
1 TABLET ORAL EVERY 12 HOURS PRN
Qty: 60 TABLET | Refills: 0 | Status: SHIPPED | OUTPATIENT
Start: 2024-12-02

## 2024-12-28 DIAGNOSIS — M54.50 LOW BACK PAIN, UNSPECIFIED BACK PAIN LATERALITY, UNSPECIFIED CHRONICITY, UNSPECIFIED WHETHER SCIATICA PRESENT: ICD-10-CM

## 2024-12-30 RX ORDER — HYDROCODONE BITARTRATE AND ACETAMINOPHEN 5; 325 MG/1; MG/1
1 TABLET ORAL EVERY 12 HOURS PRN
Qty: 60 TABLET | Refills: 0 | Status: SHIPPED | OUTPATIENT
Start: 2025-01-02

## 2024-12-30 RX ORDER — CYCLOBENZAPRINE HCL 10 MG
10 TABLET ORAL 3 TIMES DAILY PRN
Qty: 90 TABLET | Refills: 0 | Status: SHIPPED | OUTPATIENT
Start: 2024-12-30

## 2024-12-30 NOTE — TELEPHONE ENCOUNTER
Last OV 11/13/2024  Upcoming OV 5/14/2025    Per  last dispensed 12/03/2024     Rx order set up.     Doxepin Counseling:  Patient advised that the medication is sedating and not to drive a car after taking this medication. Patient informed of potential adverse effects including but not limited to dry mouth, urinary retention, and blurry vision.  The patient verbalized understanding of the proper use and possible adverse effects of doxepin.  All of the patient's questions and concerns were addressed.

## 2025-01-16 ENCOUNTER — PATIENT MESSAGE (OUTPATIENT)
Dept: FAMILY MEDICINE | Facility: CLINIC | Age: 65
End: 2025-01-16
Payer: COMMERCIAL

## 2025-01-31 DIAGNOSIS — M54.50 LOW BACK PAIN, UNSPECIFIED BACK PAIN LATERALITY, UNSPECIFIED CHRONICITY, UNSPECIFIED WHETHER SCIATICA PRESENT: ICD-10-CM

## 2025-02-03 DIAGNOSIS — M54.50 LOW BACK PAIN, UNSPECIFIED BACK PAIN LATERALITY, UNSPECIFIED CHRONICITY, UNSPECIFIED WHETHER SCIATICA PRESENT: ICD-10-CM

## 2025-02-03 RX ORDER — CYCLOBENZAPRINE HCL 10 MG
10 TABLET ORAL 3 TIMES DAILY PRN
Qty: 90 TABLET | Refills: 0 | OUTPATIENT
Start: 2025-02-03

## 2025-02-03 RX ORDER — HYDROCODONE BITARTRATE AND ACETAMINOPHEN 5; 325 MG/1; MG/1
1 TABLET ORAL EVERY 12 HOURS PRN
Qty: 60 TABLET | Refills: 0 | Status: SHIPPED | OUTPATIENT
Start: 2025-02-03

## 2025-02-03 RX ORDER — CYCLOBENZAPRINE HCL 10 MG
10 TABLET ORAL 3 TIMES DAILY PRN
Qty: 90 TABLET | Refills: 0 | Status: SHIPPED | OUTPATIENT
Start: 2025-02-03 | End: 2025-02-18 | Stop reason: SDUPTHER

## 2025-02-03 RX ORDER — HYDROCODONE BITARTRATE AND ACETAMINOPHEN 5; 325 MG/1; MG/1
1 TABLET ORAL EVERY 12 HOURS PRN
Qty: 60 TABLET | Refills: 0 | Status: CANCELLED | OUTPATIENT
Start: 2025-02-03

## 2025-02-15 DIAGNOSIS — K21.9 GASTROESOPHAGEAL REFLUX DISEASE, UNSPECIFIED WHETHER ESOPHAGITIS PRESENT: ICD-10-CM

## 2025-02-15 DIAGNOSIS — D68.9 COAGULATION DISORDER: ICD-10-CM

## 2025-02-15 DIAGNOSIS — I10 HYPERTENSION, UNSPECIFIED TYPE: ICD-10-CM

## 2025-02-17 ENCOUNTER — PATIENT MESSAGE (OUTPATIENT)
Dept: FAMILY MEDICINE | Facility: CLINIC | Age: 65
End: 2025-02-17
Payer: COMMERCIAL

## 2025-02-17 RX ORDER — PANTOPRAZOLE SODIUM 40 MG/1
40 TABLET, DELAYED RELEASE ORAL DAILY
Qty: 90 TABLET | Refills: 1 | Status: SHIPPED | OUTPATIENT
Start: 2025-02-17

## 2025-02-17 RX ORDER — AMLODIPINE BESYLATE 5 MG/1
5 TABLET ORAL DAILY
Qty: 90 TABLET | Refills: 1 | Status: SHIPPED | OUTPATIENT
Start: 2025-02-17

## 2025-02-18 ENCOUNTER — OFFICE VISIT (OUTPATIENT)
Dept: FAMILY MEDICINE | Facility: CLINIC | Age: 65
End: 2025-02-18
Payer: COMMERCIAL

## 2025-02-18 VITALS
HEIGHT: 64 IN | WEIGHT: 215 LBS | DIASTOLIC BLOOD PRESSURE: 80 MMHG | SYSTOLIC BLOOD PRESSURE: 128 MMHG | BODY MASS INDEX: 36.7 KG/M2 | TEMPERATURE: 98 F | OXYGEN SATURATION: 98 % | HEART RATE: 84 BPM

## 2025-02-18 DIAGNOSIS — E78.2 MIXED HYPERLIPIDEMIA: ICD-10-CM

## 2025-02-18 DIAGNOSIS — J32.9 SINUSITIS, UNSPECIFIED CHRONICITY, UNSPECIFIED LOCATION: Primary | ICD-10-CM

## 2025-02-18 DIAGNOSIS — M54.50 LOW BACK PAIN, UNSPECIFIED BACK PAIN LATERALITY, UNSPECIFIED CHRONICITY, UNSPECIFIED WHETHER SCIATICA PRESENT: ICD-10-CM

## 2025-02-18 DIAGNOSIS — Z79.899 HIGH RISK MEDICATION USE: ICD-10-CM

## 2025-02-18 DIAGNOSIS — D68.9 COAGULATION DISORDER: ICD-10-CM

## 2025-02-18 PROCEDURE — 3079F DIAST BP 80-89 MM HG: CPT | Mod: CPTII,S$GLB,, | Performed by: NURSE PRACTITIONER

## 2025-02-18 PROCEDURE — 1159F MED LIST DOCD IN RCRD: CPT | Mod: CPTII,S$GLB,, | Performed by: NURSE PRACTITIONER

## 2025-02-18 PROCEDURE — 99214 OFFICE O/P EST MOD 30 MIN: CPT | Mod: 25,S$GLB,, | Performed by: NURSE PRACTITIONER

## 2025-02-18 PROCEDURE — 3074F SYST BP LT 130 MM HG: CPT | Mod: CPTII,S$GLB,, | Performed by: NURSE PRACTITIONER

## 2025-02-18 PROCEDURE — 96372 THER/PROPH/DIAG INJ SC/IM: CPT | Mod: S$GLB,,, | Performed by: NURSE PRACTITIONER

## 2025-02-18 PROCEDURE — 3008F BODY MASS INDEX DOCD: CPT | Mod: CPTII,S$GLB,, | Performed by: NURSE PRACTITIONER

## 2025-02-18 PROCEDURE — 1160F RVW MEDS BY RX/DR IN RCRD: CPT | Mod: CPTII,S$GLB,, | Performed by: NURSE PRACTITIONER

## 2025-02-18 RX ORDER — CYCLOBENZAPRINE HCL 10 MG
10 TABLET ORAL 3 TIMES DAILY PRN
Qty: 90 TABLET | Refills: 0 | Status: SHIPPED | OUTPATIENT
Start: 2025-02-18

## 2025-02-18 RX ORDER — PRAVASTATIN SODIUM 40 MG/1
40 TABLET ORAL DAILY
Qty: 90 TABLET | Refills: 3 | Status: SHIPPED | OUTPATIENT
Start: 2025-02-18 | End: 2025-08-17

## 2025-02-18 RX ORDER — AZITHROMYCIN 250 MG/1
TABLET, FILM COATED ORAL
Qty: 6 TABLET | Refills: 0 | Status: SHIPPED | OUTPATIENT
Start: 2025-02-18 | End: 2025-02-23

## 2025-02-18 RX ORDER — DEXAMETHASONE SODIUM PHOSPHATE 4 MG/ML
8 INJECTION, SOLUTION INTRA-ARTICULAR; INTRALESIONAL; INTRAMUSCULAR; INTRAVENOUS; SOFT TISSUE
Status: COMPLETED | OUTPATIENT
Start: 2025-02-18 | End: 2025-02-18

## 2025-02-18 RX ADMIN — DEXAMETHASONE SODIUM PHOSPHATE 8 MG: 4 INJECTION, SOLUTION INTRA-ARTICULAR; INTRALESIONAL; INTRAMUSCULAR; INTRAVENOUS; SOFT TISSUE at 01:02

## 2025-02-24 NOTE — PROGRESS NOTES
SUBJECTIVE:    Patient ID: Jessica Mitchell is a 64 y.o. female.    Chief Complaint: Sinusitis (Nasal congestion for one week, declined flu vaccine//mammogram//, Dr Cabello colonoscopy, need refills,abc )    History of Present Illness    CHIEF COMPLAINT:  64 year old female presents today for urgent visit    HISTORY OF PRESENT ILLNESS:  She reports symptoms began last week with fever and body aches which have since improved. Current symptoms include sneezing, coughing, and sinus drainage with colored sputum production. She notes exposure to a sick contact who has recovered after being ill for four days.    MEDICATIONS:  She is taking Mucinex, vitamin C, and zinc for current symptoms. Her prescription medications include pravastatin and flexeril.      ROS:  General: +fever, -chills, -fatigue, -weight gain, -weight loss  Eyes: -vision changes, -redness, -discharge  ENT: -ear pain, -nasal congestion, -sore throat  Cardiovascular: -chest pain, -palpitations, -lower extremity edema  Respiratory: +cough, -shortness of breath  Gastrointestinal: -abdominal pain, -nausea, -vomiting, -diarrhea, -constipation, -blood in stool  Genitourinary: -dysuria, -hematuria, -frequency  Musculoskeletal: -joint pain, -muscle pain  Skin: -rash, -lesion  Neurological: -headache, -dizziness, -numbness, -tingling  Psychiatric: -anxiety, -depression, -sleep difficulty         No visits with results within 6 Month(s) from this visit.   Latest known visit with results is:   Telephone on 05/06/2024   Component Date Value Ref Range Status    WBC 05/13/2024 7.0  3.8 - 10.8 Thousand/uL Final    RBC 05/13/2024 5.51 (H)  3.80 - 5.10 Million/uL Final    Hemoglobin 05/13/2024 15.7 (H)  11.7 - 15.5 g/dL Final    Hematocrit 05/13/2024 48.3 (H)  35.0 - 45.0 % Final    MCV 05/13/2024 87.7  80.0 - 100.0 fL Final    MCH 05/13/2024 28.5  27.0 - 33.0 pg Final    MCHC 05/13/2024 32.5  32.0 - 36.0 g/dL Final    RDW 05/13/2024 12.9  11.0 - 15.0 % Final     Platelets 05/13/2024 320  140 - 400 Thousand/uL Final    MPV 05/13/2024 9.8  7.5 - 12.5 fL Final    Neutrophils, Abs 05/13/2024 4,403  1,500 - 7,800 cells/uL Final    Lymph # 05/13/2024 2,058  850 - 3,900 cells/uL Final    Mono # 05/13/2024 357  200 - 950 cells/uL Final    Eos # 05/13/2024 119  15 - 500 cells/uL Final    Baso # 05/13/2024 63  0 - 200 cells/uL Final    Neutrophils Relative 05/13/2024 62.9  % Final    Lymph % 05/13/2024 29.4  % Final    Mono % 05/13/2024 5.1  % Final    Eosinophil % 05/13/2024 1.7  % Final    Basophil % 05/13/2024 0.9  % Final    Glucose 05/13/2024 93  65 - 99 mg/dL Final    BUN 05/13/2024 11  7 - 25 mg/dL Final    Creatinine 05/13/2024 0.77  0.50 - 1.05 mg/dL Final    eGFR 05/13/2024 87  > OR = 60 mL/min/1.73m2 Final    BUN/Creatinine Ratio 05/13/2024 SEE NOTE:  6 - 22 (calc) Final    Sodium 05/13/2024 138  135 - 146 mmol/L Final    Potassium 05/13/2024 4.4  3.5 - 5.3 mmol/L Final    Chloride 05/13/2024 101  98 - 110 mmol/L Final    CO2 05/13/2024 28  20 - 32 mmol/L Final    Calcium 05/13/2024 9.2  8.6 - 10.4 mg/dL Final    Total Protein 05/13/2024 7.3  6.1 - 8.1 g/dL Final    Albumin 05/13/2024 4.2  3.6 - 5.1 g/dL Final    Globulin, Total 05/13/2024 3.1  1.9 - 3.7 g/dL (calc) Final    Albumin/Globulin Ratio 05/13/2024 1.4  1.0 - 2.5 (calc) Final    Total Bilirubin 05/13/2024 0.5  0.2 - 1.2 mg/dL Final    Alkaline Phosphatase 05/13/2024 74  37 - 153 U/L Final    AST 05/13/2024 27  10 - 35 U/L Final    ALT 05/13/2024 33 (H)  6 - 29 U/L Final    Cholesterol 05/13/2024 192  <200 mg/dL Final    HDL 05/13/2024 59  > OR = 50 mg/dL Final    Triglycerides 05/13/2024 89  <150 mg/dL Final    LDL Cholesterol 05/13/2024 114 (H)  mg/dL (calc) Final    HDL/Cholesterol Ratio 05/13/2024 3.3  <5.0 (calc) Final    Non HDL Chol. (LDL+VLDL) 05/13/2024 133 (H)  <130 mg/dL (calc) Final    TSH w/reflex to FT4 05/13/2024 0.97  0.40 - 4.50 mIU/L Final       Past Medical History:   Diagnosis Date    Adrenal  "tumor     DVT (deep venous thrombosis) 2012    Hiatal hernia     HTN (hypertension)     Multiple thyroid nodules     two    Stomach ulcer     Stroke     at 31 years old     Social History[1]  Past Surgical History:   Procedure Laterality Date    Bilatweral Tubal ligation      ESOPHAGEAL DILATION       No family history on file.    Tests to Keep You Healthy    Mammogram: ORDERED BUT NOT SCHEDULED  Colon Cancer Screening: DUE  Cervical Cancer Screening: DUE  Last Blood Pressure <= 139/89 (2/18/2025): Yes      Review of patient's allergies indicates:   Allergen Reactions    Codeine Itching    Codeine sulfate      Other reaction(s): Unknown    Lisinopril Other (See Comments)     cough  Other reaction(s): Unknown     Current Medications[2]    Objective:      Vitals:    02/18/25 1257   BP: 128/80   Pulse: 84   Temp: 98 °F (36.7 °C)   SpO2: 98%   Weight: 97.5 kg (215 lb)   Height: 5' 4" (1.626 m)     Physical Exam    General: No acute distress. Well-developed. Well-nourished.  Eyes: EOMI. Sclerae anicteric.  HENT: Normocephalic. Atraumatic. Nares patent. Moist oral mucosa.  Ears: Bilateral TMs clear. Bilateral EACs clear.  Cardiovascular: Regular rate. Regular rhythm. No murmurs. No rubs. No gallops. Normal S1, S2.  Respiratory: Normal respiratory effort. Clear to auscultation bilaterally. No rales. No rhonchi. No wheezing.  Abdomen: Soft. Non-tender. Non-distended. Normoactive bowel sounds.  Musculoskeletal: No  obvious deformity.  Extremities: No lower extremity edema.  Neurological: Alert & oriented x3. No slurred speech. Normal gait.  Psychiatric: Normal mood. Normal affect. Good insight. Good judgment.  Skin: Warm. Dry. No rash.  Neck: Swollen glands.       Assessment:       Assessment & Plan    - Assessed patient's symptoms as likely progressed from initial flu to sinus infection based on duration of illness and current presentation  - Determined antiviral treatment no longer indicated due to time elapsed since " symptom onset  - Will treat as sinus infection due to swollen glands and persistent drainage  - Considered steroid injection to address inflammation    SINUS INFECTION:  - Assessed the patient's condition as a sinus infection based on reported symptoms and exam.  - Observed significant drainage upon exam.  - Prescribed an antibiotic for the sinus infection and sent the prescription to [facility].  - Administered a steroid injection in the office to reduce inflammation.  - Noted the patient's reports of ongoing sinus drainage and colored sputum, indicating an active infection.  - Noted the patient's report of colored sputum when coughing, which is consistent with the diagnosed sinus infection.  - Observed swollen glands during the physical exam, which may be related to the current infection.  - Noted the patient's report of coughing, which is likely associated with the sinus infection and postnasal drip.  - Recorded the patient's report of experiencing fever, which is a common symptom of acute sinusitis.    MEDICATIONS/SUPPLEMENTS:  - Refilled pravastatin, sent to [facility] pharmacy.  - Refilled flexeril, sent to [facility] pharmacy.    FOLLOW UP:  - Follow up for yearly follow-up before May, including lab work.  - Contact office if needed.       Plan:       Sinusitis, unspecified chronicity, unspecified location  Comments:  zpack. decadron. call if symptoms persist    Mixed hyperlipidemia  Comments:  continue pravastatin  Orders:  -     pravastatin (PRAVACHOL) 40 MG tablet; Take 1 tablet (40 mg total) by mouth once daily.  Dispense: 90 tablet; Refill: 3    High risk medication use  -     pravastatin (PRAVACHOL) 40 MG tablet; Take 1 tablet (40 mg total) by mouth once daily.  Dispense: 90 tablet; Refill: 3    Low back pain, unspecified back pain laterality, unspecified chronicity, unspecified whether sciatica present  Comments:  continue norco.flexeril prn  Orders:  -     cyclobenzaprine (FLEXERIL) 10 MG tablet; Take  1 tablet (10 mg total) by mouth 3 (three) times daily as needed for Muscle spasms.  Dispense: 90 tablet; Refill: 0  -     dexAMETHasone injection 8 mg    Coagulation disorder  Comments:  eliquis    Other angela  -     azithromycin (Z-KELSEA) 250 MG tablet; Take 2 tablets by mouth on day 1; Take 1 tablet by mouth on days 2-5  Dispense: 6 tablet; Refill: 0      Follow up in about 4 months (around 6/18/2025) for medication management.        This note was generated with the assistance of ambient listening technology. Verbal consent was obtained by the patient and accompanying visitor(s) for the recording of patient appointment to facilitate this note. I attest to having reviewed and edited the generated note for accuracy, though some syntax or spelling errors may persist. Please contact the author of this note for any clarification.      2/23/2025 Angi Barrett           [1]   Social History  Socioeconomic History    Marital status:    Tobacco Use    Smoking status: Former     Passive exposure: Past    Smokeless tobacco: Never   Substance and Sexual Activity    Alcohol use: Yes     Alcohol/week: 0.0 standard drinks of alcohol     Comment: socially    Drug use: No     Social Drivers of Health     Financial Resource Strain: High Risk (1/8/2024)    Overall Financial Resource Strain (CARDIA)     Difficulty of Paying Living Expenses: Very hard   Food Insecurity: Food Insecurity Present (1/8/2024)    Hunger Vital Sign     Worried About Running Out of Food in the Last Year: Often true     Ran Out of Food in the Last Year: Sometimes true   Transportation Needs: No Transportation Needs (1/8/2024)    PRAPARE - Transportation     Lack of Transportation (Medical): No     Lack of Transportation (Non-Medical): No   Physical Activity: Inactive (1/8/2024)    Exercise Vital Sign     Days of Exercise per Week: 0 days     Minutes of Exercise per Session: 0 min   Stress: Stress Concern Present (1/8/2024)    [employer] [place] [employer]  [employer] - Occupational Stress Questionnaire     Feeling of Stress : Very much   Housing Stability: Low Risk  (1/8/2024)    Housing Stability Vital Sign     Unable to Pay for Housing in the Last Year: No     Number of Places Lived in the Last Year: 1     Unstable Housing in the Last Year: No   [2]   Current Outpatient Medications:     amLODIPine (NORVASC) 5 MG tablet, Take 1 tablet (5 mg total) by mouth once daily., Disp: 90 tablet, Rfl: 1    apixaban (ELIQUIS) 5 mg Tab, Take 1 tablet (5 mg total) by mouth 2 (two) times daily., Disp: 180 tablet, Rfl: 2    aspirin (ECOTRIN) 81 MG EC tablet, Take 81 mg by mouth once daily. Only takes when out of Eliquis, Disp: , Rfl:     ciclopirox (PENLAC) 8 % Soln, Apply topically nightly., Disp: 6.6 mL, Rfl: 2    DULoxetine (CYMBALTA) 30 MG capsule, Take 2 capsules (60 mg total) by mouth once daily., Disp: 180 capsule, Rfl: 0    DULoxetine (CYMBALTA) 30 MG capsule, Take 2 capsules (60 mg total) by mouth once daily., Disp: 180 capsule, Rfl: 0    HYDROcodone-acetaminophen (NORCO) 5-325 mg per tablet, Take 1 tablet by mouth every 12 (twelve) hours as needed for Pain., Disp: 60 tablet, Rfl: 0    linaCLOtide (LINZESS) 145 mcg Cap capsule, Take 145 mcg by mouth as needed., Disp: , Rfl:     pantoprazole (PROTONIX) 40 MG tablet, Take 1 tablet (40 mg total) by mouth once daily., Disp: 90 tablet, Rfl: 1    azithromycin (Z-KELSEA) 250 MG tablet, Take 2 tablets by mouth on day 1; Take 1 tablet by mouth on days 2-5, Disp: 6 tablet, Rfl: 0    cyclobenzaprine (FLEXERIL) 10 MG tablet, Take 1 tablet (10 mg total) by mouth 3 (three) times daily as needed for Muscle spasms., Disp: 90 tablet, Rfl: 0    pravastatin (PRAVACHOL) 40 MG tablet, Take 1 tablet (40 mg total) by mouth once daily., Disp: 90 tablet, Rfl: 3

## 2025-02-25 ENCOUNTER — TELEPHONE (OUTPATIENT)
Dept: FAMILY MEDICINE | Facility: CLINIC | Age: 65
End: 2025-02-25
Payer: COMMERCIAL

## 2025-02-25 NOTE — TELEPHONE ENCOUNTER
Spoke to pt and she stated Dr. Munoz is requiring her to see a hematologist and cardiologist before doing the colonoscopy. Pt stated she is not doing a mammogram or a PAP

## 2025-02-25 NOTE — TELEPHONE ENCOUNTER
----- Message from Angi Barrett NP sent at 2/23/2025  9:19 PM CST -----  Colonoscopy? Name listed in rooming notes? Pap? Mammogram?

## 2025-03-19 DIAGNOSIS — Z12.31 OTHER SCREENING MAMMOGRAM: ICD-10-CM

## 2025-03-20 DIAGNOSIS — M54.50 LOW BACK PAIN, UNSPECIFIED BACK PAIN LATERALITY, UNSPECIFIED CHRONICITY, UNSPECIFIED WHETHER SCIATICA PRESENT: ICD-10-CM

## 2025-03-21 RX ORDER — CYCLOBENZAPRINE HCL 10 MG
10 TABLET ORAL 3 TIMES DAILY PRN
Qty: 90 TABLET | Refills: 0 | Status: SHIPPED | OUTPATIENT
Start: 2025-03-21

## 2025-03-21 RX ORDER — HYDROCODONE BITARTRATE AND ACETAMINOPHEN 5; 325 MG/1; MG/1
1 TABLET ORAL EVERY 12 HOURS PRN
Qty: 60 TABLET | Refills: 0 | Status: SHIPPED | OUTPATIENT
Start: 2025-03-21

## 2025-03-24 ENCOUNTER — PATIENT MESSAGE (OUTPATIENT)
Dept: ADMINISTRATIVE | Facility: HOSPITAL | Age: 65
End: 2025-03-24
Payer: COMMERCIAL

## 2025-04-21 ENCOUNTER — PATIENT MESSAGE (OUTPATIENT)
Dept: FAMILY MEDICINE | Facility: CLINIC | Age: 65
End: 2025-04-21
Payer: COMMERCIAL

## 2025-04-22 NOTE — TELEPHONE ENCOUNTER
Spoke to pt she is going to try and see If she can come because her is in horrible pain. Will let us know

## 2025-04-28 ENCOUNTER — TELEPHONE (OUTPATIENT)
Dept: FAMILY MEDICINE | Facility: CLINIC | Age: 65
End: 2025-04-28
Payer: COMMERCIAL

## 2025-04-28 DIAGNOSIS — I10 HYPERTENSION, UNSPECIFIED TYPE: ICD-10-CM

## 2025-04-28 DIAGNOSIS — E78.2 MIXED HYPERLIPIDEMIA: Primary | ICD-10-CM

## 2025-04-28 DIAGNOSIS — Z00.00 ROUTINE GENERAL MEDICAL EXAMINATION AT A HEALTH CARE FACILITY: ICD-10-CM

## 2025-04-28 DIAGNOSIS — Z79.899 ENCOUNTER FOR LONG-TERM (CURRENT) USE OF HIGH-RISK MEDICATION: ICD-10-CM

## 2025-04-28 DIAGNOSIS — Z79.899 HIGH RISK MEDICATION USE: ICD-10-CM

## 2025-04-29 DIAGNOSIS — M54.50 LOW BACK PAIN, UNSPECIFIED BACK PAIN LATERALITY, UNSPECIFIED CHRONICITY, UNSPECIFIED WHETHER SCIATICA PRESENT: ICD-10-CM

## 2025-04-29 RX ORDER — DULOXETIN HYDROCHLORIDE 30 MG/1
60 CAPSULE, DELAYED RELEASE ORAL DAILY
Qty: 180 CAPSULE | Refills: 0 | Status: SHIPPED | OUTPATIENT
Start: 2025-04-29

## 2025-04-29 RX ORDER — CYCLOBENZAPRINE HCL 10 MG
10 TABLET ORAL 3 TIMES DAILY PRN
Qty: 90 TABLET | Refills: 0 | Status: SHIPPED | OUTPATIENT
Start: 2025-04-29

## 2025-04-29 RX ORDER — HYDROCODONE BITARTRATE AND ACETAMINOPHEN 5; 325 MG/1; MG/1
1 TABLET ORAL EVERY 12 HOURS PRN
Qty: 60 TABLET | Refills: 0 | Status: SHIPPED | OUTPATIENT
Start: 2025-04-29

## 2025-05-12 ENCOUNTER — PATIENT MESSAGE (OUTPATIENT)
Dept: FAMILY MEDICINE | Facility: CLINIC | Age: 65
End: 2025-05-12
Payer: COMMERCIAL

## 2025-05-12 DIAGNOSIS — M25.559 HIP PAIN, UNSPECIFIED LATERALITY: ICD-10-CM

## 2025-05-12 DIAGNOSIS — M54.9 BACK PAIN, UNSPECIFIED BACK LOCATION, UNSPECIFIED BACK PAIN LATERALITY, UNSPECIFIED CHRONICITY: Primary | ICD-10-CM

## 2025-05-15 ENCOUNTER — HOSPITAL ENCOUNTER (OUTPATIENT)
Dept: RADIOLOGY | Facility: HOSPITAL | Age: 65
Discharge: HOME | End: 2025-05-15
Attending: NURSE PRACTITIONER
Payer: COMMERCIAL

## 2025-05-15 DIAGNOSIS — M25.559 HIP PAIN, UNSPECIFIED LATERALITY: ICD-10-CM

## 2025-05-15 DIAGNOSIS — M54.9 BACK PAIN, UNSPECIFIED BACK LOCATION, UNSPECIFIED BACK PAIN LATERALITY, UNSPECIFIED CHRONICITY: ICD-10-CM

## 2025-05-15 PROCEDURE — 72100 X-RAY EXAM L-S SPINE 2/3 VWS: CPT | Mod: 26,,, | Performed by: RADIOLOGY

## 2025-05-15 PROCEDURE — 72100 X-RAY EXAM L-S SPINE 2/3 VWS: CPT | Mod: TC,PO

## 2025-05-15 PROCEDURE — 73502 X-RAY EXAM HIP UNI 2-3 VIEWS: CPT | Mod: 26,LT,, | Performed by: RADIOLOGY

## 2025-05-15 PROCEDURE — 73502 X-RAY EXAM HIP UNI 2-3 VIEWS: CPT | Mod: TC,PO,LT

## 2025-05-16 ENCOUNTER — OFFICE VISIT (OUTPATIENT)
Dept: PAIN MEDICINE | Facility: CLINIC | Age: 65
End: 2025-05-16
Payer: COMMERCIAL

## 2025-05-16 ENCOUNTER — OFFICE VISIT (OUTPATIENT)
Dept: FAMILY MEDICINE | Facility: CLINIC | Age: 65
End: 2025-05-16
Payer: COMMERCIAL

## 2025-05-16 VITALS
HEART RATE: 85 BPM | DIASTOLIC BLOOD PRESSURE: 72 MMHG | WEIGHT: 208.19 LBS | HEIGHT: 64 IN | BODY MASS INDEX: 35.54 KG/M2 | OXYGEN SATURATION: 98 % | SYSTOLIC BLOOD PRESSURE: 110 MMHG

## 2025-05-16 VITALS — BODY MASS INDEX: 35.51 KG/M2 | HEIGHT: 64 IN | WEIGHT: 208 LBS

## 2025-05-16 DIAGNOSIS — E78.2 MIXED HYPERLIPIDEMIA: Primary | ICD-10-CM

## 2025-05-16 DIAGNOSIS — D68.9 COAGULATION DISORDER: ICD-10-CM

## 2025-05-16 DIAGNOSIS — K21.9 GASTROESOPHAGEAL REFLUX DISEASE, UNSPECIFIED WHETHER ESOPHAGITIS PRESENT: ICD-10-CM

## 2025-05-16 DIAGNOSIS — S32.010A COMPRESSION FRACTURE OF L1 VERTEBRA, INITIAL ENCOUNTER: Primary | ICD-10-CM

## 2025-05-16 DIAGNOSIS — M54.50 LOW BACK PAIN, UNSPECIFIED BACK PAIN LATERALITY, UNSPECIFIED CHRONICITY, UNSPECIFIED WHETHER SCIATICA PRESENT: ICD-10-CM

## 2025-05-16 DIAGNOSIS — R63.0 DECREASED APPETITE: ICD-10-CM

## 2025-05-16 DIAGNOSIS — I10 PRIMARY HYPERTENSION: ICD-10-CM

## 2025-05-16 PROCEDURE — 1159F MED LIST DOCD IN RCRD: CPT | Mod: CPTII,S$GLB,, | Performed by: NURSE PRACTITIONER

## 2025-05-16 PROCEDURE — 3078F DIAST BP <80 MM HG: CPT | Mod: CPTII,S$GLB,, | Performed by: NURSE PRACTITIONER

## 2025-05-16 PROCEDURE — 3074F SYST BP LT 130 MM HG: CPT | Mod: CPTII,S$GLB,, | Performed by: NURSE PRACTITIONER

## 2025-05-16 PROCEDURE — 1160F RVW MEDS BY RX/DR IN RCRD: CPT | Mod: CPTII,S$GLB,, | Performed by: NURSE PRACTITIONER

## 2025-05-16 PROCEDURE — 99214 OFFICE O/P EST MOD 30 MIN: CPT | Mod: S$GLB,,, | Performed by: NURSE PRACTITIONER

## 2025-05-16 PROCEDURE — 99999 PR PBB SHADOW E&M-EST. PATIENT-LVL IV: CPT | Mod: PBBFAC,,, | Performed by: STUDENT IN AN ORGANIZED HEALTH CARE EDUCATION/TRAINING PROGRAM

## 2025-05-16 PROCEDURE — 3008F BODY MASS INDEX DOCD: CPT | Mod: CPTII,S$GLB,, | Performed by: NURSE PRACTITIONER

## 2025-05-16 RX ORDER — KETOROLAC TROMETHAMINE 30 MG/ML
30 INJECTION, SOLUTION INTRAMUSCULAR; INTRAVENOUS
Status: SHIPPED | OUTPATIENT
Start: 2025-05-16

## 2025-05-16 RX ORDER — TIZANIDINE 4 MG/1
4 TABLET ORAL EVERY 8 HOURS
Qty: 90 TABLET | Refills: 2 | Status: SHIPPED | OUTPATIENT
Start: 2025-05-16 | End: 2025-06-15

## 2025-05-17 NOTE — PROGRESS NOTES
[place] - Department    Angi Barrett NP      First Office Visit: 5/16/25  Today' Date: 5/16/2025  Last Office Visit: None    Chief complaint: back pain     HPI: Pt is a pleasant 64 y.o., who presents for evaluation. Referred by .Angi Barrett NP. Pt seen for back pain since Easter. Endorses pain that stays mostly in the middle of the lower back. Also endorses having sharp, shooting pain going across the lower back to the back of the L hip. Pt did have an XR L-spine recently which showed an L1 compression fx. Does not have a back brace currently. No BB changes.         Pain disability index score: 56  Pain score: 7    Relevant Imaging/ Testing:   XR L-spine 5/25 - L1 compression fx     Procedures: None    Date of board of pharmacy review:5/16/2025  Date of opioid risk screening/ pain psych: None  Date of opioid agreement and consent: None  Date of urine drug screen: None  Date of random pill count: None     was reviewed today: reviewed, norco use    Prescribed medications: None    See EHR for  PMH, PSH, FH, SH, Medications and Allergy    ROS:  Positive for pain  ROS     PE:  There were no vitals filed for this visit.  General: Pleasant, no distress  HEENT: NC/ AT. PERRLA  CV: Radial pulses intact  Pulm: No distress  Ext: No edema    Physical Exam     Neuromusculoskeletal:  Head: NC, AT  Neck: Intact range of motions  Shoulder: Intact range of motion  Lumbar: limited range of motion d/t pain. Bilat Facet loading. Min Tenderness. Neg SL. Pain with flexion and extension.   Hip: Intact range of motion  SI: Level, Min tenderness  Knee: Intact range of motion  Reflexes: normal Knee  Strength: 5/5 globally   Sensory: Grossly intact   Skin: No bruising, erythema  Gait: Normal      Impression:  Back pain  Relevant History  BMI 35.70  Anxiety  Anticoagulant use     Assessment:  L1 compression fx   Chronic opiate use         Plan:  Discussed options  Imaging/ relevant records viewed/ reviewed/ discussed  Imaging results  viewed and reviewed (noted above)/ reviewed with patient   reviewed  LSO brace ordered  Zanaflex rx   IM toradol today  MR L-spine   Re-eval after  Consider kyphoplasty L!       Prescribed medications:  1. Zanaflex     The impression and plan were discussed and explained in detail. All the questions were answered. Education was provided accordingly.     The appropriate use of the medications, including storage, risks (including addiction) and potential sides effects were explained and discussed.     Follow-up:  2 wks or sooner if needed    Jaki Avalos MD     This note was generated with the assistance of ambient listening technology. Verbal consent was obtained by the patient and accompanying visitor(s) for the recording of patient appointment to facilitate this note. I attest to having reviewed and edited the generated note for accuracy, though some syntax or spelling errors may persist. Please contact the author of this note for any clarification.

## 2025-05-19 ENCOUNTER — PATIENT MESSAGE (OUTPATIENT)
Dept: PAIN MEDICINE | Facility: CLINIC | Age: 65
End: 2025-05-19
Payer: COMMERCIAL

## 2025-05-26 ENCOUNTER — TELEPHONE (OUTPATIENT)
Dept: PAIN MEDICINE | Facility: CLINIC | Age: 65
End: 2025-05-26
Payer: COMMERCIAL

## 2025-05-26 ENCOUNTER — HOSPITAL ENCOUNTER (OUTPATIENT)
Dept: RADIOLOGY | Facility: HOSPITAL | Age: 65
Discharge: HOME | End: 2025-05-26
Attending: STUDENT IN AN ORGANIZED HEALTH CARE EDUCATION/TRAINING PROGRAM
Payer: COMMERCIAL

## 2025-05-26 ENCOUNTER — RESULTS FOLLOW-UP (OUTPATIENT)
Dept: PAIN MEDICINE | Facility: CLINIC | Age: 65
End: 2025-05-26

## 2025-05-26 DIAGNOSIS — S32.010A COMPRESSION FRACTURE OF L1 VERTEBRA, INITIAL ENCOUNTER: ICD-10-CM

## 2025-05-26 PROCEDURE — 72148 MRI LUMBAR SPINE W/O DYE: CPT | Mod: TC

## 2025-05-26 PROCEDURE — 72148 MRI LUMBAR SPINE W/O DYE: CPT | Mod: 26,,, | Performed by: RADIOLOGY

## 2025-05-26 NOTE — TELEPHONE ENCOUNTER
Spoke with this patent and informed her that her doctor said she still need her to come in so she can make sure she has a back brace. The patient stated she has had one for a week because her doctor put in the order for her to get it on her last visit. The patient stated that she was under the impression that her next appointment was for her fracture to get fixed.

## 2025-05-26 NOTE — TELEPHONE ENCOUNTER
Spoke with this patient and informed her of her imaging results per her doctor and she stated that if she could not have surgery at her appointment she was not coming in to discuss a surgery and just would not have surgery because it will be her living with a broken back 7 weeks. I did let her know that I would relay this to her doctor and get back to her as soon as possible.

## 2025-05-27 ENCOUNTER — TELEPHONE (OUTPATIENT)
Dept: PAIN MEDICINE | Facility: CLINIC | Age: 65
End: 2025-05-27
Payer: COMMERCIAL

## 2025-05-27 NOTE — TELEPHONE ENCOUNTER
----- Message from Med Assistant Danya sent at 5/26/2025  1:27 PM CDT -----  Good afternoon, I called this patient to inform her of her results and once given she stated that if she is not having surgery at her next appointment she will not have surgery because that will be   her having to live with a broken back 7 weeks.   ----- Message -----  From: Jkai Avalos MD  Sent: 5/26/2025  11:29 AM CDT  To: Danya Harkins MA    Please inform pt her MRI of her back is showing subacute compression fracture at L1. We will discuss next steps at the upcoming appointment. EDITA CORTEZ  ----- Message -----  From: Interface, Rad Results In  Sent: 5/26/2025  10:46 AM CDT  To: Jaki Avalos MD

## 2025-05-27 NOTE — TELEPHONE ENCOUNTER
Pt is not wanting to be seen in office s/p MR L-spine unless she is coming in for kyphoplasty. Tried to explain to pt she would need to be seen after MRI and then scheduled. Pt understands but declines to be scheduled for appt.

## 2025-05-29 ENCOUNTER — TELEPHONE (OUTPATIENT)
Dept: FAMILY MEDICINE | Facility: CLINIC | Age: 65
End: 2025-05-29
Payer: COMMERCIAL

## 2025-06-01 RX ORDER — CYCLOBENZAPRINE HCL 10 MG
10 TABLET ORAL 3 TIMES DAILY PRN
Qty: 90 TABLET | Refills: 0 | Status: SHIPPED | OUTPATIENT
Start: 2025-06-01

## 2025-06-01 RX ORDER — DULOXETIN HYDROCHLORIDE 30 MG/1
60 CAPSULE, DELAYED RELEASE ORAL DAILY
Qty: 180 CAPSULE | Refills: 0 | Status: SHIPPED | OUTPATIENT
Start: 2025-06-01

## 2025-06-02 ENCOUNTER — TELEPHONE (OUTPATIENT)
Dept: FAMILY MEDICINE | Facility: CLINIC | Age: 65
End: 2025-06-02
Payer: COMMERCIAL

## 2025-06-02 DIAGNOSIS — M54.50 LOW BACK PAIN, UNSPECIFIED BACK PAIN LATERALITY, UNSPECIFIED CHRONICITY, UNSPECIFIED WHETHER SCIATICA PRESENT: ICD-10-CM

## 2025-06-03 RX ORDER — HYDROCODONE BITARTRATE AND ACETAMINOPHEN 5; 325 MG/1; MG/1
1 TABLET ORAL EVERY 12 HOURS PRN
Qty: 60 TABLET | Refills: 0 | Status: SHIPPED | OUTPATIENT
Start: 2025-06-03 | End: 2025-06-04 | Stop reason: SDUPTHER

## 2025-06-04 DIAGNOSIS — M54.50 LOW BACK PAIN, UNSPECIFIED BACK PAIN LATERALITY, UNSPECIFIED CHRONICITY, UNSPECIFIED WHETHER SCIATICA PRESENT: ICD-10-CM

## 2025-06-05 RX ORDER — HYDROCODONE BITARTRATE AND ACETAMINOPHEN 5; 325 MG/1; MG/1
1 TABLET ORAL EVERY 12 HOURS PRN
Qty: 60 TABLET | Refills: 0 | Status: SHIPPED | OUTPATIENT
Start: 2025-06-05

## 2025-07-02 ENCOUNTER — PATIENT MESSAGE (OUTPATIENT)
Dept: FAMILY MEDICINE | Facility: CLINIC | Age: 65
End: 2025-07-02
Payer: COMMERCIAL

## 2025-07-02 NOTE — LETTER
[facility]  [address]  [address] [place] [id number]  Phone: [phone]  Fax: [phone] July 2, 2025    Jessica [place]  [address] [address] [place] [address]      To Whom It May Concern:    Jessica Mitchell is unable to participate in jury duty due to an ongoing back injury.    If you have any questions or concerns, please feel free to call my office.    Sincerely,        Angi Barrett, NP

## 2025-07-06 DIAGNOSIS — M54.50 LOW BACK PAIN, UNSPECIFIED BACK PAIN LATERALITY, UNSPECIFIED CHRONICITY, UNSPECIFIED WHETHER SCIATICA PRESENT: ICD-10-CM

## 2025-07-07 RX ORDER — HYDROCODONE BITARTRATE AND ACETAMINOPHEN 5; 325 MG/1; MG/1
1 TABLET ORAL EVERY 12 HOURS PRN
Qty: 60 TABLET | Refills: 0 | Status: SHIPPED | OUTPATIENT
Start: 2025-07-07

## 2025-07-13 ENCOUNTER — HOSPITAL ENCOUNTER (OUTPATIENT)
Facility: HOSPITAL | Age: 65
Discharge: HOME | End: 2025-07-14
Attending: EMERGENCY MEDICINE | Admitting: INTERNAL MEDICINE
Payer: COMMERCIAL

## 2025-07-13 DIAGNOSIS — R50.9 COUGH WITH FEVER: ICD-10-CM

## 2025-07-13 DIAGNOSIS — R07.9 CHEST PAIN: ICD-10-CM

## 2025-07-13 DIAGNOSIS — R05.9 COUGH WITH FEVER: ICD-10-CM

## 2025-07-13 DIAGNOSIS — J18.9 ACUTE PNEUMONIA: ICD-10-CM

## 2025-07-13 PROBLEM — R09.02 HYPOXIA: Status: ACTIVE | Noted: 2025-07-13

## 2025-07-13 LAB
ABSOLUTE EOSINOPHIL (SMH): 0 K/UL
ABSOLUTE MONOCYTE (SMH): 0.36 K/UL (ref 0.3–1)
ABSOLUTE NEUTROPHIL COUNT (SMH): 5.1 K/UL (ref 1.8–7.7)
ALBUMIN SERPL-MCNC: 3.8 G/DL (ref 3.5–5.2)
ALP SERPL-CCNC: 102 UNIT/L (ref 55–135)
ALT SERPL-CCNC: 44 UNIT/L (ref 10–44)
ANION GAP (SMH): 11 MMOL/L (ref 8–16)
AST SERPL-CCNC: 45 UNIT/L (ref 10–40)
BASOPHILS # BLD AUTO: 0.03 K/UL
BASOPHILS NFR BLD AUTO: 0.4 %
BILIRUB SERPL-MCNC: 0.4 MG/DL (ref 0.1–1)
BUN SERPL-MCNC: 9 MG/DL (ref 8–23)
CALCIUM SERPL-MCNC: 8.5 MG/DL (ref 8.7–10.5)
CHLORIDE SERPL-SCNC: 98 MMOL/L (ref 95–110)
CO2 SERPL-SCNC: 26 MMOL/L (ref 23–29)
CREAT SERPL-MCNC: 0.7 MG/DL (ref 0.5–1.4)
ERYTHROCYTE [DISTWIDTH] IN BLOOD BY AUTOMATED COUNT: 13.2 % (ref 11.5–14.5)
GFR SERPLBLD CREATININE-BSD FMLA CKD-EPI: >60 ML/MIN/1.73/M2
GLUCOSE SERPL-MCNC: 103 MG/DL (ref 70–110)
GROUP A STREP MOLECULAR (OHS): NEGATIVE
HCT VFR BLD AUTO: 42.7 % (ref 37–48.5)
HGB BLD-MCNC: 14.4 GM/DL (ref 12–16)
IMM GRANULOCYTES # BLD AUTO: 0.02 K/UL (ref 0–0.04)
IMM GRANULOCYTES NFR BLD AUTO: 0.3 % (ref 0–0.5)
INFLUENZA A MOLECULAR (OHS): NEGATIVE
INFLUENZA B MOLECULAR (OHS): NEGATIVE
LDH SERPL L TO P-CCNC: 0.67 MMOL/L (ref 0.5–2.2)
LYMPHOCYTES # BLD AUTO: 1.17 K/UL (ref 1–4.8)
MCH RBC QN AUTO: 28.2 PG (ref 27–31)
MCHC RBC AUTO-ENTMCNC: 33.7 G/DL (ref 32–36)
MCV RBC AUTO: 84 FL (ref 82–98)
NUCLEATED RBC (/100WBC) (SMH): 0 /100 WBC
PLATELET # BLD AUTO: 225 K/UL (ref 150–450)
PMV BLD AUTO: 10.7 FL (ref 9.2–12.9)
POTASSIUM SERPL-SCNC: 3.5 MMOL/L (ref 3.5–5.1)
PROT SERPL-MCNC: 7.4 GM/DL (ref 6–8.4)
RBC # BLD AUTO: 5.11 M/UL (ref 4–5.4)
RELATIVE EOSINOPHIL (SMH): 0 % (ref 0–8)
RELATIVE LYMPHOCYTE (SMH): 17.4 % (ref 18–48)
RELATIVE MONOCYTE (SMH): 5.4 % (ref 4–15)
RELATIVE NEUTROPHIL (SMH): 76.5 % (ref 38–73)
SAMPLE: NORMAL
SARS-COV-2 RDRP RESP QL NAA+PROBE: NEGATIVE
SODIUM SERPL-SCNC: 135 MMOL/L (ref 136–145)
WBC # BLD AUTO: 6.71 K/UL (ref 3.9–12.7)

## 2025-07-13 PROCEDURE — 87651 STREP A DNA AMP PROBE: CPT | Performed by: NURSE PRACTITIONER

## 2025-07-13 PROCEDURE — 80053 COMPREHEN METABOLIC PANEL: CPT | Performed by: NURSE PRACTITIONER

## 2025-07-13 PROCEDURE — 85025 COMPLETE CBC W/AUTO DIFF WBC: CPT | Performed by: NURSE PRACTITIONER

## 2025-07-13 PROCEDURE — U0002 COVID-19 LAB TEST NON-CDC: HCPCS | Performed by: NURSE PRACTITIONER

## 2025-07-13 PROCEDURE — 87040 BLOOD CULTURE FOR BACTERIA: CPT | Performed by: NURSE PRACTITIONER

## 2025-07-13 PROCEDURE — 87502 INFLUENZA DNA AMP PROBE: CPT | Performed by: NURSE PRACTITIONER

## 2025-07-13 PROCEDURE — 99900031 HC PATIENT EDUCATION (STAT)

## 2025-07-13 PROCEDURE — 27000221 HC OXYGEN, UP TO 24 HOURS

## 2025-07-13 PROCEDURE — 94799 UNLISTED PULMONARY SVC/PX: CPT

## 2025-07-13 PROCEDURE — 99900035 HC TECH TIME PER 15 MIN (STAT)

## 2025-07-13 PROCEDURE — 96365 THER/PROPH/DIAG IV INF INIT: CPT

## 2025-07-13 PROCEDURE — 96375 TX/PRO/DX INJ NEW DRUG ADDON: CPT

## 2025-07-13 PROCEDURE — G0378 HOSPITAL OBSERVATION PER HR: HCPCS

## 2025-07-13 PROCEDURE — 25000003 PHARM REV CODE 250: Performed by: INTERNAL MEDICINE

## 2025-07-13 PROCEDURE — 63600175 PHARM REV CODE 636 W HCPCS: Performed by: INTERNAL MEDICINE

## 2025-07-13 PROCEDURE — 94640 AIRWAY INHALATION TREATMENT: CPT

## 2025-07-13 PROCEDURE — 99285 EMERGENCY DEPT VISIT HI MDM: CPT | Mod: 25

## 2025-07-13 PROCEDURE — 96361 HYDRATE IV INFUSION ADD-ON: CPT

## 2025-07-13 PROCEDURE — 25000003 PHARM REV CODE 250: Performed by: STUDENT IN AN ORGANIZED HEALTH CARE EDUCATION/TRAINING PROGRAM

## 2025-07-13 PROCEDURE — 25000003 PHARM REV CODE 250: Performed by: NURSE PRACTITIONER

## 2025-07-13 PROCEDURE — 25000242 PHARM REV CODE 250 ALT 637 W/ HCPCS: Performed by: NURSE PRACTITIONER

## 2025-07-13 PROCEDURE — 63600175 PHARM REV CODE 636 W HCPCS: Performed by: NURSE PRACTITIONER

## 2025-07-13 PROCEDURE — 36415 COLL VENOUS BLD VENIPUNCTURE: CPT | Performed by: NURSE PRACTITIONER

## 2025-07-13 PROCEDURE — 94761 N-INVAS EAR/PLS OXIMETRY MLT: CPT

## 2025-07-13 RX ORDER — HYDRALAZINE HYDROCHLORIDE 20 MG/ML
10 INJECTION INTRAMUSCULAR; INTRAVENOUS EVERY 4 HOURS PRN
Status: DISCONTINUED | OUTPATIENT
Start: 2025-07-13 | End: 2025-07-14 | Stop reason: HOSPADM

## 2025-07-13 RX ORDER — NALOXONE HCL 0.4 MG/ML
0.02 VIAL (ML) INJECTION
Status: DISCONTINUED | OUTPATIENT
Start: 2025-07-13 | End: 2025-07-14 | Stop reason: HOSPADM

## 2025-07-13 RX ORDER — IPRATROPIUM BROMIDE AND ALBUTEROL SULFATE 2.5; .5 MG/3ML; MG/3ML
3 SOLUTION RESPIRATORY (INHALATION)
Status: COMPLETED | OUTPATIENT
Start: 2025-07-13 | End: 2025-07-13

## 2025-07-13 RX ORDER — DULOXETIN HYDROCHLORIDE 30 MG/1
60 CAPSULE, DELAYED RELEASE ORAL DAILY
Status: DISCONTINUED | OUTPATIENT
Start: 2025-07-13 | End: 2025-07-14 | Stop reason: HOSPADM

## 2025-07-13 RX ORDER — SODIUM,POTASSIUM PHOSPHATES 280-250MG
2 POWDER IN PACKET (EA) ORAL
Status: DISCONTINUED | OUTPATIENT
Start: 2025-07-13 | End: 2025-07-14 | Stop reason: HOSPADM

## 2025-07-13 RX ORDER — CEFTRIAXONE 1 G/1
1 INJECTION, POWDER, FOR SOLUTION INTRAMUSCULAR; INTRAVENOUS
Status: COMPLETED | OUTPATIENT
Start: 2025-07-13 | End: 2025-07-13

## 2025-07-13 RX ORDER — ALUMINUM HYDROXIDE, MAGNESIUM HYDROXIDE, AND SIMETHICONE 1200; 120; 1200 MG/30ML; MG/30ML; MG/30ML
30 SUSPENSION ORAL 4 TIMES DAILY PRN
Status: DISCONTINUED | OUTPATIENT
Start: 2025-07-13 | End: 2025-07-14 | Stop reason: HOSPADM

## 2025-07-13 RX ORDER — LANOLIN ALCOHOL/MO/W.PET/CERES
800 CREAM (GRAM) TOPICAL
Status: DISCONTINUED | OUTPATIENT
Start: 2025-07-13 | End: 2025-07-14 | Stop reason: HOSPADM

## 2025-07-13 RX ORDER — ACETAMINOPHEN 325 MG/1
650 TABLET ORAL
Status: COMPLETED | OUTPATIENT
Start: 2025-07-13 | End: 2025-07-13

## 2025-07-13 RX ORDER — TALC
6 POWDER (GRAM) TOPICAL NIGHTLY PRN
Status: DISCONTINUED | OUTPATIENT
Start: 2025-07-13 | End: 2025-07-14 | Stop reason: HOSPADM

## 2025-07-13 RX ORDER — ASPIRIN 81 MG/1
81 TABLET ORAL DAILY
Status: DISCONTINUED | OUTPATIENT
Start: 2025-07-13 | End: 2025-07-14 | Stop reason: HOSPADM

## 2025-07-13 RX ORDER — ONDANSETRON HYDROCHLORIDE 2 MG/ML
4 INJECTION, SOLUTION INTRAVENOUS EVERY 6 HOURS PRN
Status: DISCONTINUED | OUTPATIENT
Start: 2025-07-13 | End: 2025-07-14 | Stop reason: HOSPADM

## 2025-07-13 RX ORDER — PANTOPRAZOLE SODIUM 40 MG/1
40 TABLET, DELAYED RELEASE ORAL DAILY
Status: DISCONTINUED | OUTPATIENT
Start: 2025-07-14 | End: 2025-07-14 | Stop reason: HOSPADM

## 2025-07-13 RX ORDER — CEFTRIAXONE 2 G/1
2 INJECTION, POWDER, FOR SOLUTION INTRAMUSCULAR; INTRAVENOUS
Status: DISCONTINUED | OUTPATIENT
Start: 2025-07-14 | End: 2025-07-14 | Stop reason: HOSPADM

## 2025-07-13 RX ORDER — HYDROCODONE BITARTRATE AND ACETAMINOPHEN 5; 325 MG/1; MG/1
1 TABLET ORAL EVERY 6 HOURS PRN
Refills: 0 | Status: DISCONTINUED | OUTPATIENT
Start: 2025-07-13 | End: 2025-07-14 | Stop reason: HOSPADM

## 2025-07-13 RX ORDER — ACETAMINOPHEN 325 MG/1
650 TABLET ORAL EVERY 4 HOURS PRN
Status: DISCONTINUED | OUTPATIENT
Start: 2025-07-13 | End: 2025-07-14 | Stop reason: HOSPADM

## 2025-07-13 RX ORDER — AMLODIPINE BESYLATE 5 MG/1
5 TABLET ORAL DAILY
Status: DISCONTINUED | OUTPATIENT
Start: 2025-07-13 | End: 2025-07-14 | Stop reason: HOSPADM

## 2025-07-13 RX ORDER — PRAVASTATIN SODIUM 40 MG/1
40 TABLET ORAL NIGHTLY
Status: DISCONTINUED | OUTPATIENT
Start: 2025-07-13 | End: 2025-07-14 | Stop reason: HOSPADM

## 2025-07-13 RX ORDER — GUAIFENESIN AND DEXTROMETHORPHAN HYDROBROMIDE 10; 100 MG/5ML; MG/5ML
5 SYRUP ORAL EVERY 4 HOURS PRN
Status: DISCONTINUED | OUTPATIENT
Start: 2025-07-13 | End: 2025-07-14 | Stop reason: HOSPADM

## 2025-07-13 RX ORDER — ACETAMINOPHEN 325 MG/1
650 TABLET ORAL EVERY 8 HOURS PRN
Status: DISCONTINUED | OUTPATIENT
Start: 2025-07-13 | End: 2025-07-13

## 2025-07-13 RX ADMIN — ACETAMINOPHEN 650 MG: 325 TABLET ORAL at 11:07

## 2025-07-13 RX ADMIN — AZITHROMYCIN DIHYDRATE 500 MG: 500 INJECTION, POWDER, LYOPHILIZED, FOR SOLUTION INTRAVENOUS at 02:07

## 2025-07-13 RX ADMIN — ASPIRIN 81 MG: 81 TABLET ORAL at 02:07

## 2025-07-13 RX ADMIN — IPRATROPIUM BROMIDE AND ALBUTEROL SULFATE 3 ML: 2.5; .5 SOLUTION RESPIRATORY (INHALATION) at 10:07

## 2025-07-13 RX ADMIN — SODIUM CHLORIDE 1000 ML: 9 INJECTION, SOLUTION INTRAVENOUS at 01:07

## 2025-07-13 RX ADMIN — POTASSIUM BICARBONATE 50 MEQ: 977.5 TABLET, EFFERVESCENT ORAL at 05:07

## 2025-07-13 RX ADMIN — CEFTRIAXONE SODIUM 1 G: 1 INJECTION, POWDER, FOR SOLUTION INTRAMUSCULAR; INTRAVENOUS at 11:07

## 2025-07-13 RX ADMIN — GUAIFENESIN AND DEXTROMETHORPHAN 5 ML: 100; 10 SYRUP ORAL at 10:07

## 2025-07-13 NOTE — SUBJECTIVE & OBJECTIVE
Past Medical History:   Diagnosis Date    Adrenal tumor     DVT (deep venous thrombosis) 2012    Hiatal hernia     HTN (hypertension)     Multiple thyroid nodules     two    Stomach ulcer     Stroke     at 31 years old       Past Surgical History:   Procedure Laterality Date    Bilatweral Tubal ligation      ESOPHAGEAL DILATION         Review of patient's allergies indicates:   Allergen Reactions    Codeine Itching    Codeine sulfate      Other reaction(s): Unknown    Lisinopril Other (See Comments)     cough  Other reaction(s): Unknown       Current Facility-Administered Medications on File Prior to Encounter   Medication    ketorolac injection 30 mg     Current Outpatient Medications on File Prior to Encounter   Medication Sig    amLODIPine (NORVASC) 5 MG tablet Take 1 tablet (5 mg total) by mouth once daily.    apixaban (ELIQUIS) 5 mg Tab Take 1 tablet (5 mg total) by mouth 2 (two) times daily.    aspirin (ECOTRIN) 81 MG EC tablet Take 81 mg by mouth once daily. Only takes when out of Eliquis    ciclopirox (PENLAC) 8 % Soln Apply topically nightly.    cyclobenzaprine (FLEXERIL) 10 MG tablet Take 1 tablet (10 mg total) by mouth 3 (three) times daily as needed for Muscle spasms.    DULoxetine (CYMBALTA) 30 MG capsule Take 2 capsules (60 mg total) by mouth once daily.    DULoxetine (CYMBALTA) 30 MG capsule Take 2 capsules (60 mg total) by mouth once daily.    HYDROcodone-acetaminophen (NORCO) 5-325 mg per tablet Take 1 tablet by mouth every 12 (twelve) hours as needed for Pain.    linaCLOtide (LINZESS) 145 mcg Cap capsule Take 145 mcg by mouth as needed.    pantoprazole (PROTONIX) 40 MG tablet Take 1 tablet (40 mg total) by mouth once daily.    pravastatin (PRAVACHOL) 40 MG tablet Take 1 tablet (40 mg total) by mouth once daily.     Family History       Problem Relation (Age of Onset)    Hypertension Mother          Tobacco Use    Smoking status: Former     Passive exposure: Past    Smokeless tobacco: Never    Substance and Sexual Activity    Alcohol use: Yes     Alcohol/week: 0.0 standard drinks of alcohol     Comment: socially    Drug use: No    Sexual activity: Not on file     Review of Systems   Constitutional:  Negative for activity change and appetite change.   HENT:  Negative for congestion and dental problem.    Eyes:  Negative for discharge and itching.   Respiratory:  Positive for cough and shortness of breath.    Cardiovascular:  Negative for chest pain.   Gastrointestinal:  Negative for abdominal distention and abdominal pain.   Endocrine: Negative for cold intolerance.   Genitourinary:  Negative for difficulty urinating and dysuria.   Musculoskeletal:  Negative for arthralgias and back pain.   Skin:  Negative for color change.   Neurological:  Negative for dizziness and facial asymmetry.   Hematological:  Negative for adenopathy.   Psychiatric/Behavioral:  Negative for agitation and behavioral problems.      Objective:     Vital Signs (Most Recent):  Temp: 98.2 °F (36.8 °C) (07/13/25 1238)  Pulse: 91 (07/13/25 1405)  Resp: 18 (07/13/25 1405)  BP: (!) 159/72 (07/13/25 1405)  SpO2: 95 % (07/13/25 1405) Vital Signs (24h Range):  Temp:  [98.2 °F (36.8 °C)-99.4 °F (37.4 °C)] 98.2 °F (36.8 °C)  Pulse:  [] 91  Resp:  [16-20] 18  SpO2:  [92 %-100 %] 95 %  BP: (134-159)/(72-87) 159/72     Weight: 90.7 kg (200 lb)  Body mass index is 34.33 kg/m².     Physical Exam  Vitals and nursing note reviewed.   Constitutional:       General: She is not in acute distress.  HENT:      Head: Atraumatic.      Right Ear: External ear normal.      Left Ear: External ear normal.      Nose: Nose normal.      Mouth/Throat:      Mouth: Mucous membranes are moist.   Eyes:      Extraocular Movements: Extraocular movements intact.   Cardiovascular:      Rate and Rhythm: Normal rate.   Pulmonary:      Effort: Pulmonary effort is normal.      Breath sounds: Normal breath sounds.   Musculoskeletal:         General: Normal range of  motion.      Cervical back: Normal range of motion.   Skin:     General: Skin is warm.   Neurological:      Mental Status: She is alert and oriented to person, place, and time.   Psychiatric:         Behavior: Behavior normal.                Significant Labs: All pertinent labs within the past 24 hours have been reviewed.  CBC:   Recent Labs   Lab 07/13/25  1215   WBC 6.71   HGB 14.4   HCT 42.7        CMP:   Recent Labs   Lab 07/13/25  1215   *   K 3.5   CL 98   CO2 26      BUN 9   CREATININE 0.7   CALCIUM 8.5*   PROT 7.4   ALBUMIN 3.8   BILITOT 0.4   ALKPHOS 102   AST 45*   ALT 44   ANIONGAP 11       Significant Imaging: I have reviewed all pertinent imaging results/findings within the past 24 hours.

## 2025-07-13 NOTE — PHARMACY MED REC
"    Admission Medication History     The home medication history was taken by Jeanne Chavis.    You may go to "Admission" then "Reconcile Home Medications" tabs to review and/or act upon these items.     The home medication list has been updated by the Pharmacy department.   Please read ALL comments highlighted in yellow.   Please address this information as you see fit.    Feel free to contact us if you have any questions or require assistance.        Medications listed below were obtained from: Patient/family and Analytic software- [email or web address]  Medications Ordered Prior to Encounter[1]      Jeanne Chavis  [id number]              .               [1]   Current Facility-Administered Medications on File Prior to Encounter   Medication Dose Route Frequency Provider Last Rate Last Admin    ketorolac injection 30 mg  30 mg Intramuscular 1 time in Clinic/HOD          Current Outpatient Medications on File Prior to Encounter   Medication Sig Dispense Refill    amLODIPine (NORVASC) 5 MG tablet Take 1 tablet (5 mg total) by mouth once daily. 90 tablet 1    apixaban (ELIQUIS) 5 mg Tab Take 1 tablet (5 mg total) by mouth 2 (two) times daily. 180 tablet 2    aspirin (ECOTRIN) 81 MG EC tablet Take 81 mg by mouth once daily. Only takes when out of Eliquis      cyclobenzaprine (FLEXERIL) 10 MG tablet Take 1 tablet (10 mg total) by mouth 3 (three) times daily as needed for Muscle spasms. 90 tablet 0    DULoxetine (CYMBALTA) 30 MG capsule Take 2 capsules (60 mg total) by mouth once daily. (Patient taking differently: Take 30 mg by mouth 2 (two) times daily.) 180 capsule 0    HYDROcodone-acetaminophen (NORCO) 5-325 mg per tablet Take 1 tablet by mouth every 12 (twelve) hours as needed for Pain. 60 tablet 0    linaCLOtide (LINZESS) 145 mcg Cap capsule Take 145 mcg by mouth as needed.      pantoprazole (PROTONIX) 40 MG tablet Take 1 tablet (40 mg total) by mouth once daily. 90 tablet 1    pravastatin (PRAVACHOL) 40 MG tablet Take 1 tablet (40 " mg total) by mouth once daily. 90 tablet 3    ciclopirox (PENLAC) 8 % Soln Apply topically nightly. 6.6 mL 2    [DISCONTINUED] DULoxetine (CYMBALTA) 30 MG capsule Take 2 capsules (60 mg total) by mouth once daily. 180 capsule 0

## 2025-07-13 NOTE — ASSESSMENT & PLAN NOTE
Abx as below     Antibiotics (From admission, onward)      Start     Stop Route Frequency Ordered    07/14/25 0900  cefTRIAXone injection 2 g         -- IV Every 24 hours (non-standard times) 07/13/25 1406    07/13/25 1515  azithromycin (ZITHROMAX) 500 mg in 0.9% NaCl 250 mL IVPB (admixture device)         -- IV Every 24 hours (non-standard times) 07/13/25 1406            Microbiology Results (last 7 days)       Procedure Component Value Units Date/Time    Blood culture #1 **CANNOT BE ORDERED STAT** [[id number]] Collected: 07/13/25 1314    Order Status: Sent Specimen: Blood from Peripheral, Hand, Right     Blood culture #2 **CANNOT BE ORDERED STAT** [[id number]] Collected: 07/13/25 1314    Order Status: Sent Specimen: Blood from Peripheral, Antecubital, Left     Group A Strep, Molecular [[id number]]  (Normal) Collected: 07/13/25 1008    Order Status: Completed Specimen: Throat Updated: 07/13/25 1124     Group A Strep Molecular Negative    Influenza A & B by Molecular [[id number]]  (Normal) Collected: 07/13/25 1008    Order Status: Completed Specimen: Nasal Swab Updated: 07/13/25 1114     INFLUENZA A MOLECULAR Negative     INFLUENZA B MOLECULAR  Negative

## 2025-07-13 NOTE — HPI
64 year old pt getting admitted with acute pneumonia/Hypoxia  Pt and her  was ill with URTI like symptoms  Reported sinus congestion/post nasal drip like symptoms  Then started having fever/cough/SOB came to ER  In ER she was hypoxic for a while and got admitted

## 2025-07-13 NOTE — ASSESSMENT & PLAN NOTE
Patient's blood pressure range in the last 24 hours was: BP  Min: 134/83  Max: 183/90.The patient's inpatient anti-hypertensive regimen is listed below:  Current Antihypertensives  amLODIPine tablet 5 mg, Daily, Oral  hydrALAZINE injection 10 mg, Every 4 hours PRN, Intravenous

## 2025-07-13 NOTE — H&P
"  [facility] - Emergency Dept  Hospital Medicine  History & Physical    Patient Name: Jessica Mitchell  MRN: [id number]  Patient Class: OP- Observation  Admission Date: 7/13/2025  Attending Physician: Everardo Escobar MD   Primary Care Provider: Angi Barrett NP         Patient information was obtained from patient, past medical records, ER records, and ER MD .     Subjective:     Principal Problem:Acute pneumonia    Chief Complaint:   Chief Complaint   Patient presents with    Sore Throat     Sore throat/cough/congestion started Wednesday.  "Wants to be tested for COVID"        HPI: 64 year old pt getting admitted with acute pneumonia/Hypoxia  Pt and her  was ill with URTI like symptoms  Reported sinus congestion/post nasal drip like symptoms  Then started having fever/cough/SOB came to ER  In ER she was hypoxic for a while and got admitted     Past Medical History:   Diagnosis Date    Adrenal tumor     DVT (deep venous thrombosis) 2012    Hiatal hernia     HTN (hypertension)     Multiple thyroid nodules     two    Stomach ulcer     Stroke     at 31 years old       Past Surgical History:   Procedure Laterality Date    Bilatweral Tubal ligation      ESOPHAGEAL DILATION         Review of patient's allergies indicates:   Allergen Reactions    Codeine Itching    Codeine sulfate      Other reaction(s): Unknown    Lisinopril Other (See Comments)     cough  Other reaction(s): Unknown       Current Facility-Administered Medications on File Prior to Encounter   Medication    ketorolac injection 30 mg     Current Outpatient Medications on File Prior to Encounter   Medication Sig    amLODIPine (NORVASC) 5 MG tablet Take 1 tablet (5 mg total) by mouth once daily.    apixaban (ELIQUIS) 5 mg Tab Take 1 tablet (5 mg total) by mouth 2 (two) times daily.    aspirin (ECOTRIN) 81 MG EC tablet Take 81 mg by mouth once daily. Only takes when out of Eliquis    ciclopirox (PENLAC) 8 % Soln Apply topically nightly.    " cyclobenzaprine (FLEXERIL) 10 MG tablet Take 1 tablet (10 mg total) by mouth 3 (three) times daily as needed for Muscle spasms.    DULoxetine (CYMBALTA) 30 MG capsule Take 2 capsules (60 mg total) by mouth once daily.    DULoxetine (CYMBALTA) 30 MG capsule Take 2 capsules (60 mg total) by mouth once daily.    HYDROcodone-acetaminophen (NORCO) 5-325 mg per tablet Take 1 tablet by mouth every 12 (twelve) hours as needed for Pain.    linaCLOtide (LINZESS) 145 mcg Cap capsule Take 145 mcg by mouth as needed.    pantoprazole (PROTONIX) 40 MG tablet Take 1 tablet (40 mg total) by mouth once daily.    pravastatin (PRAVACHOL) 40 MG tablet Take 1 tablet (40 mg total) by mouth once daily.     Family History       Problem Relation (Age of Onset)    Hypertension Mother          Tobacco Use    Smoking status: Former     Passive exposure: Past    Smokeless tobacco: Never   Substance and Sexual Activity    Alcohol use: Yes     Alcohol/week: 0.0 standard drinks of alcohol     Comment: socially    Drug use: No    Sexual activity: Not on file     Review of Systems   Constitutional:  Negative for activity change and appetite change.   HENT:  Negative for congestion and dental problem.    Eyes:  Negative for discharge and itching.   Respiratory:  Positive for cough and shortness of breath.    Cardiovascular:  Negative for chest pain.   Gastrointestinal:  Negative for abdominal distention and abdominal pain.   Endocrine: Negative for cold intolerance.   Genitourinary:  Negative for difficulty urinating and dysuria.   Musculoskeletal:  Negative for arthralgias and back pain.   Skin:  Negative for color change.   Neurological:  Negative for dizziness and facial asymmetry.   Hematological:  Negative for adenopathy.   Psychiatric/Behavioral:  Negative for agitation and behavioral problems.      Objective:     Vital Signs (Most Recent):  Temp: 98.2 °F (36.8 °C) (07/13/25 1238)  Pulse: 91 (07/13/25 1405)  Resp: 18 (07/13/25 1405)  BP: (!)  159/72 (07/13/25 1405)  SpO2: 95 % (07/13/25 1405) Vital Signs (24h Range):  Temp:  [98.2 °F (36.8 °C)-99.4 °F (37.4 °C)] 98.2 °F (36.8 °C)  Pulse:  [] 91  Resp:  [16-20] 18  SpO2:  [92 %-100 %] 95 %  BP: (134-159)/(72-87) 159/72     Weight: 90.7 kg (200 lb)  Body mass index is 34.33 kg/m².     Physical Exam  Vitals and nursing note reviewed.   Constitutional:       General: She is not in acute distress.  HENT:      Head: Atraumatic.      Right Ear: External ear normal.      Left Ear: External ear normal.      Nose: Nose normal.      Mouth/Throat:      Mouth: Mucous membranes are moist.   Eyes:      Extraocular Movements: Extraocular movements intact.   Cardiovascular:      Rate and Rhythm: Normal rate.   Pulmonary:      Effort: Pulmonary effort is normal.      Breath sounds: Normal breath sounds.   Musculoskeletal:         General: Normal range of motion.      Cervical back: Normal range of motion.   Skin:     General: Skin is warm.   Neurological:      Mental Status: She is alert and oriented to person, place, and time.   Psychiatric:         Behavior: Behavior normal.                Significant Labs: All pertinent labs within the past 24 hours have been reviewed.  CBC:   Recent Labs   Lab 07/13/25  1215   WBC 6.71   HGB 14.4   HCT 42.7        CMP:   Recent Labs   Lab 07/13/25  1215   *   K 3.5   CL 98   CO2 26      BUN 9   CREATININE 0.7   CALCIUM 8.5*   PROT 7.4   ALBUMIN 3.8   BILITOT 0.4   ALKPHOS 102   AST 45*   ALT 44   ANIONGAP 11       Significant Imaging: I have reviewed all pertinent imaging results/findings within the past 24 hours.    Assessment/Plan:     Assessment & Plan  Acute pneumonia  Abx as below     Antibiotics (From admission, onward)      Start     Stop Route Frequency Ordered    07/14/25 0900  cefTRIAXone injection 2 g         -- IV Every 24 hours (non-standard times) 07/13/25 1406    07/13/25 1515  azithromycin (ZITHROMAX) 500 mg in 0.9% NaCl 250 mL IVPB (admixture  device)         -- IV Every 24 hours (non-standard times) 07/13/25 1406            Microbiology Results (last 7 days)       Procedure Component Value Units Date/Time    Blood culture #1 **CANNOT BE ORDERED STAT** [[id number]] Collected: 07/13/25 1314    Order Status: Sent Specimen: Blood from Peripheral, Hand, Right     Blood culture #2 **CANNOT BE ORDERED STAT** [[id number]] Collected: 07/13/25 1314    Order Status: Sent Specimen: Blood from Peripheral, Antecubital, Left     Group A Strep, Molecular [[id number]]  (Normal) Collected: 07/13/25 1008    Order Status: Completed Specimen: Throat Updated: 07/13/25 1124     Group A Strep Molecular Negative    Influenza A & B by Molecular [[id number]]  (Normal) Collected: 07/13/25 1008    Order Status: Completed Specimen: Nasal Swab Updated: 07/13/25 1114     INFLUENZA A MOLECULAR Negative     INFLUENZA B MOLECULAR  Negative          Obesity (BMI 30.0-34.9)  Vadim therapeutic life style measures    HTN (hypertension)  Patient's blood pressure range in the last 24 hours was: BP  Min: 134/83  Max: 183/90.The patient's inpatient anti-hypertensive regimen is listed below:  Current Antihypertensives  amLODIPine tablet 5 mg, Daily, Oral  hydrALAZINE injection 10 mg, Every 4 hours PRN, Intravenous      Coagulation disorder  Maintain NOAC     Hypoxia  Transient hypoxia from pneumonia      VTE Risk Mitigation (From admission, onward)           Ordered     apixaban tablet 5 mg  2 times daily         07/13/25 1404     IP VTE HIGH RISK PATIENT  Once         07/13/25 1404     Place sequential compression device  Until discontinued         07/13/25 1404                                   On 07/13/2025, patient should be placed in hospital observation services under my care.             Everardo Escobar MD  Department of Hospital Medicine  [facility] - Emergency Dept

## 2025-07-13 NOTE — ED PROVIDER NOTES
"Encounter Date: 7/13/2025       History     Chief Complaint   Patient presents with    Sore Throat     Sore throat/cough/congestion started Wednesday.  "Wants to be tested for COVID"     Presents with complaint of sore throat cough and congestion.  Onset Wednesday.  Patient reports she had 102 6 fever this morning.  She took no medications for the temp and she is afebrile here.  She has a history of strep.  Her  is here with the same symptoms.  She wants to be tested for COVID.  She denies nausea vomiting or diarrhea.      Review of patient's allergies indicates:   Allergen Reactions    Codeine Itching    Codeine sulfate      Other reaction(s): Unknown    Lisinopril Other (See Comments)     cough  Other reaction(s): Unknown     Past Medical History:   Diagnosis Date    Adrenal tumor     DVT (deep venous thrombosis) 2012    Hiatal hernia     HTN (hypertension)     Multiple thyroid nodules     two    Stomach ulcer     Stroke     at 31 years old     Past Surgical History:   Procedure Laterality Date    Bilatweral Tubal ligation      ESOPHAGEAL DILATION       Family History   Problem Relation Name Age of Onset    Hypertension Mother       Social History[1]  Review of Systems   Constitutional:  Positive for fever. Negative for chills.   HENT:  Positive for congestion and sore throat. Negative for trouble swallowing.    Respiratory:  Positive for cough. Negative for shortness of breath and wheezing.    Cardiovascular:  Negative for chest pain, palpitations and leg swelling.   Gastrointestinal:  Negative for abdominal pain, diarrhea, nausea and vomiting.   Musculoskeletal:  Negative for back pain and gait problem.   Skin:  Negative for rash.   Neurological:  Negative for weakness.       Physical Exam     Initial Vitals [07/13/25 0943]   BP Pulse Resp Temp SpO2   (!) 159/87 102 20 99.4 °F (37.4 °C) (!) 94 %      MAP       --         Physical Exam    Constitutional: She appears well-developed and well-nourished. "   HENT:   Head: Normocephalic. Mouth/Throat: Oropharynx is clear and moist.   Eyes: Conjunctivae are normal.   Neck: Neck supple.   Normal range of motion.  Cardiovascular:  Normal rate, regular rhythm and normal heart sounds.           Pulmonary/Chest: No respiratory distress. She has wheezes. She has rhonchi.   Abdominal: Abdomen is soft. Bowel sounds are normal. She exhibits no distension. There is no abdominal tenderness.   Musculoskeletal:         General: Normal range of motion.      Cervical back: Normal range of motion and neck supple.      Comments: Patient is ambulatory per self gait steady.  She moves all extremities without difficulty.     Neurological: She is alert and oriented to person, place, and time. No sensory deficit. GCS score is 15. GCS eye subscore is 4. GCS verbal subscore is 5. GCS motor subscore is 6.   Skin: Skin is warm and dry. Capillary refill takes less than 2 seconds.   Psychiatric: She has a normal mood and affect. Thought content normal.         ED Course   Procedures  Labs Reviewed   COMPREHENSIVE METABOLIC PANEL - Abnormal       Result Value    Sodium 135 (*)     Potassium 3.5      Chloride 98      CO2 26      Glucose 103      BUN 9      Creatinine 0.7      Calcium 8.5 (*)     Protein Total 7.4      Albumin 3.8      Bilirubin Total 0.4            AST 45 (*)     ALT 44      Anion Gap 11      eGFR >60     CBC WITH DIFFERENTIAL - Abnormal    WBC 6.71      RBC 5.11      Hgb 14.4      Hct 42.7      MCV 84      MCH 28.2      MCHC 33.7      RDW 13.2      Platelet Count 225      MPV 10.7      Nucleated RBC 0      Neut % 76.5 (*)     Lymph % 17.4 (*)     Mono % 5.4      Eos % 0.0      Basophil % 0.4      Imm Grans % 0.3      Neut # 5.1      Lymph # 1.17      Mono # 0.36      Eos # 0.00      Baso # 0.03      Imm Grans # 0.02     INFLUENZA A & B BY MOLECULAR - Normal    INFLUENZA A MOLECULAR Negative      INFLUENZA B MOLECULAR  Negative     GROUP A STREP, MOLECULAR - Normal    Group A  Strep Molecular Negative     SARS-COV-2 RNA AMPLIFICATION, QUAL - Normal    SARS COV-2 Molecular Negative     CULTURE, BLOOD   CULTURE, BLOOD   CBC W/ AUTO DIFFERENTIAL    Narrative:     The following orders were created for panel order CBC auto differential.  Procedure                               Abnormality         Status                     ---------                               -----------         ------                     CBC with Differential[[id number]]       Abnormal            Final result                 Please view results for these tests on the individual orders.   EXTRA TUBES    Narrative:     The following orders were created for panel order EXTRA TUBES.  Procedure                               Abnormality         Status                     ---------                               -----------         ------                     Light Blue Top Hold[[id number]]                             In process                   Please view results for these tests on the individual orders.   LIGHT BLUE TOP HOLD   ISTAT LACTATE    POC Lactate 0.67      Sample VENOUS     POCT LACTATE          Imaging Results              X-Ray Chest PA And Lateral (Final result)  Result time 07/13/25 11:01:29      Final result by Aliza Wilkinson MD (07/13/25 11:01:29)                   Impression:      Faint alveolar opacity in the right midlung presumably in the right upper lobe on the lateral view suggestive of pneumonia      Electronically signed by: Aliza Wilkinson  Date:    07/13/2025  Time:    11:01               Narrative:    EXAMINATION:  XR CHEST PA AND LATERAL    CLINICAL HISTORY:  Cough, unspecified    FINDINGS:  PA and lateral chest is compared to 10/04/2017 shows normal cardiomediastinal silhouette.    Faint alveolar opacity in the right mid lung presumably in the right upper lobe on the lateral view suggestive of pneumonia.  The remainder of the lungs are clear.  Pulmonary vasculature is normal. No acute osseous  abnormality.                                       Medications   amLODIPine tablet 5 mg (5 mg Oral Not Given 7/13/25 1515)   apixaban tablet 5 mg (has no administration in time range)   aspirin EC tablet 81 mg (81 mg Oral Given 7/13/25 1425)   DULoxetine DR capsule 60 mg (60 mg Oral Not Given 7/13/25 1515)   pantoprazole EC tablet 40 mg (has no administration in time range)   pravastatin tablet 40 mg (has no administration in time range)   melatonin tablet 6 mg (has no administration in time range)   ondansetron injection 4 mg (has no administration in time range)   aluminum-magnesium hydroxide-simethicone 200-200-20 mg/5 mL suspension 30 mL (has no administration in time range)   acetaminophen tablet 650 mg (has no administration in time range)   HYDROcodone-acetaminophen 5-325 mg per tablet 1 tablet (has no administration in time range)   naloxone 0.4 mg/mL injection 0.02 mg (has no administration in time range)   potassium bicarbonate disintegrating tablet 50 mEq (has no administration in time range)   potassium bicarbonate disintegrating tablet 35 mEq (has no administration in time range)   potassium bicarbonate disintegrating tablet 60 mEq (has no administration in time range)   magnesium oxide tablet 800 mg (has no administration in time range)   magnesium oxide tablet 800 mg (has no administration in time range)   potassium, sodium phosphates 280-160-250 mg packet 2 packet (has no administration in time range)   potassium, sodium phosphates 280-160-250 mg packet 2 packet (has no administration in time range)   potassium, sodium phosphates 280-160-250 mg packet 2 packet (has no administration in time range)   azithromycin (ZITHROMAX) 500 mg in 0.9% NaCl 250 mL IVPB (admixture device) (500 mg Intravenous New Bag 7/13/25 1425)   cefTRIAXone injection 2 g (has no administration in time range)   hydrALAZINE injection 10 mg (has no administration in time range)   albuterol-ipratropium 2.5 mg-0.5 mg/3 mL nebulizer  solution 3 mL (3 mLs Nebulization Given 7/13/25 1049)   cefTRIAXone injection 1 g (1 g Intravenous Given 7/13/25 1144)   acetaminophen tablet 650 mg (650 mg Oral Given 7/13/25 1159)   sodium chloride 0.9% bolus 1,000 mL 1,000 mL (1,000 mLs Intravenous New Bag 7/13/25 1317)     Medical Decision Making  Presents with her .  She is complaining of a sore throat cough congestion.  She reports fever of 102.6 this morning.  Patient did not take any antipyretics.  She is afebrile here in the ED. she has a history of strep.    Amount and/or Complexity of Data Reviewed  Labs: ordered.     Details: Patient's labs are within normal limits for this patient.  Strep COVID influenza are negative.  Radiology: ordered.     Details: Pneumonia right mid lung  Discussion of management or test interpretation with external provider(s): Patient is given a L of fluid here in the ED. she is given a g of Rocephin IV.  Her O2 saturation is 95 on room air.  It does drop two hundred ninety-two some point in time.  We have the tech walk her.  As he was walking her back to her room she dropped to 91.  She just not wear oxygen at home.  Nor does she have a history of COPD.  I have spoken to Dr. Escobar.  He agrees to admit this patient.    Risk  OTC drugs.  Prescription drug management.                                          Clinical Impression:  Final diagnoses:  [R05.9, R50.9] Cough with fever  [J18.9] Acute pneumonia          ED Disposition Condition    Observation                       [1]   Social History  Tobacco Use    Smoking status: Former     Passive exposure: Past    Smokeless tobacco: Never   Substance Use Topics    Alcohol use: Yes     Alcohol/week: 0.0 standard drinks of alcohol     Comment: socially    Drug use: No        Marlys Lawrence NP  07/13/25 1539       Marlys Lawrence NP  07/13/25 1541

## 2025-07-13 NOTE — CARE UPDATE
07/13/25 1049   Patient Assessment/Suction   Level of Consciousness (AVPU) alert   Respiratory Effort Unlabored   Expansion/Accessory Muscles/Retractions no use of accessory muscles   All Lung Fields Breath Sounds wheezes, expiratory   Rhythm/Pattern, Respiratory unlabored;shortness of breath   Cough Frequency no cough   PRE-TX-O2   Device (Oxygen Therapy) room air   SpO2 100 %   Pulse Oximetry Type Continuous   $ Pulse Oximetry - Multiple Charge Pulse Oximetry - Multiple   Pulse 92   Resp 16   Aerosol Therapy   $ Aerosol Therapy Charges Aerosol Treatment   Daily Review of Necessity (SVN) completed   Respiratory Treatment Status (SVN) given   Treatment Route (SVN) mask;oxygen   Patient Position Woods's   Post Treatment Assessment (SVN) increased aeration;patient reports no change in breathing   Signs of Intolerance (SVN) none   Breath Sounds Post-Respiratory Treatment   Throughout All Fields Post-Treatment All Fields   Throughout All Fields Post-Treatment aeration increased   Post-treatment Heart Rate (beats/min) 96   Post-treatment Resp Rate (breaths/min) 17   Education   $ Education Bronchodilator;15 min

## 2025-07-14 VITALS
HEART RATE: 85 BPM | HEIGHT: 64 IN | BODY MASS INDEX: 35.65 KG/M2 | RESPIRATION RATE: 16 BRPM | OXYGEN SATURATION: 93 % | DIASTOLIC BLOOD PRESSURE: 68 MMHG | SYSTOLIC BLOOD PRESSURE: 122 MMHG | WEIGHT: 208.81 LBS | TEMPERATURE: 98 F

## 2025-07-14 LAB
ABSOLUTE EOSINOPHIL (SMH): 0 K/UL
ABSOLUTE MONOCYTE (SMH): 0.34 K/UL (ref 0.3–1)
ABSOLUTE NEUTROPHIL COUNT (SMH): 4.9 K/UL (ref 1.8–7.7)
ADV 40+41 DNA STL QL NAA+NON-PROBE: NOT DETECTED
ALBUMIN SERPL-MCNC: 3.7 G/DL (ref 3.5–5.2)
ALP SERPL-CCNC: 100 UNIT/L (ref 55–135)
ALT SERPL-CCNC: 46 UNIT/L (ref 10–44)
ANION GAP (SMH): 11 MMOL/L (ref 8–16)
AST SERPL-CCNC: 46 UNIT/L (ref 10–40)
ASTRO TYP 1-8 RNA STL QL NAA+NON-PROBE: NOT DETECTED
BASOPHILS # BLD AUTO: 0.04 K/UL
BASOPHILS NFR BLD AUTO: 0.6 %
BILIRUB SERPL-MCNC: 0.5 MG/DL (ref 0.1–1)
BUN SERPL-MCNC: 9 MG/DL (ref 8–23)
C CAYETANENSIS DNA STL QL NAA+NON-PROBE: NOT DETECTED
C COLI+JEJ+UPSA DNA STL QL NAA+NON-PROBE: NOT DETECTED
C DIFF GDH STL QL: NEGATIVE
C DIFF TOX A+B STL QL IA: NEGATIVE
CALCIUM SERPL-MCNC: 8.2 MG/DL (ref 8.7–10.5)
CHLORIDE SERPL-SCNC: 99 MMOL/L (ref 95–110)
CO2 SERPL-SCNC: 24 MMOL/L (ref 23–29)
CREAT SERPL-MCNC: 0.6 MG/DL (ref 0.5–1.4)
CRYPTOSP DNA STL QL NAA+NON-PROBE: NOT DETECTED
E HISTOLYT DNA STL QL NAA+NON-PROBE: NOT DETECTED
EAEC PAA PLAS AGGR+AATA ST NAA+NON-PRB: NOT DETECTED
EC STX1+STX2 GENES STL QL NAA+NON-PROBE: NOT DETECTED
EPEC EAE GENE STL QL NAA+NON-PROBE: NOT DETECTED
ERYTHROCYTE [DISTWIDTH] IN BLOOD BY AUTOMATED COUNT: 13.2 % (ref 11.5–14.5)
ETEC LTA+ST1A+ST1B TOX ST NAA+NON-PROBE: NOT DETECTED
G LAMBLIA DNA STL QL NAA+NON-PROBE: NOT DETECTED
GFR SERPLBLD CREATININE-BSD FMLA CKD-EPI: >60 ML/MIN/1.73/M2
GLUCOSE SERPL-MCNC: 98 MG/DL (ref 70–110)
HCT VFR BLD AUTO: 43.4 % (ref 37–48.5)
HGB BLD-MCNC: 13.9 GM/DL (ref 12–16)
IMM GRANULOCYTES # BLD AUTO: 0.02 K/UL (ref 0–0.04)
IMM GRANULOCYTES NFR BLD AUTO: 0.3 % (ref 0–0.5)
LYMPHOCYTES # BLD AUTO: 1.24 K/UL (ref 1–4.8)
MAGNESIUM SERPL-MCNC: 1.7 MG/DL (ref 1.6–2.6)
MCH RBC QN AUTO: 28 PG (ref 27–31)
MCHC RBC AUTO-ENTMCNC: 32 G/DL (ref 32–36)
MCV RBC AUTO: 88 FL (ref 82–98)
NOROVIRUS GI+II RNA STL QL NAA+NON-PROBE: NOT DETECTED
NUCLEATED RBC (/100WBC) (SMH): 0 /100 WBC
P SHIGELLOIDES DNA STL QL NAA+NON-PROBE: NOT DETECTED
PLATELET # BLD AUTO: 218 K/UL (ref 150–450)
PMV BLD AUTO: 9.9 FL (ref 9.2–12.9)
POTASSIUM SERPL-SCNC: 3.4 MMOL/L (ref 3.5–5.1)
PROT SERPL-MCNC: 6.9 GM/DL (ref 6–8.4)
RBC # BLD AUTO: 4.96 M/UL (ref 4–5.4)
RELATIVE EOSINOPHIL (SMH): 0 % (ref 0–8)
RELATIVE LYMPHOCYTE (SMH): 18.9 % (ref 18–48)
RELATIVE MONOCYTE (SMH): 5.2 % (ref 4–15)
RELATIVE NEUTROPHIL (SMH): 75 % (ref 38–73)
RVA RNA STL QL NAA+NON-PROBE: NOT DETECTED
S ENT+BONG DNA STL QL NAA+NON-PROBE: NOT DETECTED
SAPO I+II+IV+V RNA STL QL NAA+NON-PROBE: NOT DETECTED
SHIGELLA SP+EIEC IPAH ST NAA+NON-PROBE: NOT DETECTED
SODIUM SERPL-SCNC: 134 MMOL/L (ref 136–145)
V CHOL+PARA+VUL DNA STL QL NAA+NON-PROBE: NOT DETECTED
V CHOLERAE DNA STL QL NAA+NON-PROBE: NOT DETECTED
WBC # BLD AUTO: 6.56 K/UL (ref 3.9–12.7)
Y ENTEROCOL DNA STL QL NAA+NON-PROBE: NOT DETECTED

## 2025-07-14 PROCEDURE — 96376 TX/PRO/DX INJ SAME DRUG ADON: CPT

## 2025-07-14 PROCEDURE — 25000003 PHARM REV CODE 250: Performed by: FAMILY MEDICINE

## 2025-07-14 PROCEDURE — 82374 ASSAY BLOOD CARBON DIOXIDE: CPT | Performed by: INTERNAL MEDICINE

## 2025-07-14 PROCEDURE — 87449 NOS EACH ORGANISM AG IA: CPT | Performed by: FAMILY MEDICINE

## 2025-07-14 PROCEDURE — 83735 ASSAY OF MAGNESIUM: CPT | Performed by: INTERNAL MEDICINE

## 2025-07-14 PROCEDURE — 99900031 HC PATIENT EDUCATION (STAT)

## 2025-07-14 PROCEDURE — G0378 HOSPITAL OBSERVATION PER HR: HCPCS

## 2025-07-14 PROCEDURE — 25000003 PHARM REV CODE 250: Performed by: STUDENT IN AN ORGANIZED HEALTH CARE EDUCATION/TRAINING PROGRAM

## 2025-07-14 PROCEDURE — 94761 N-INVAS EAR/PLS OXIMETRY MLT: CPT

## 2025-07-14 PROCEDURE — 25000003 PHARM REV CODE 250: Performed by: INTERNAL MEDICINE

## 2025-07-14 PROCEDURE — 63600175 PHARM REV CODE 636 W HCPCS: Performed by: INTERNAL MEDICINE

## 2025-07-14 PROCEDURE — 36415 COLL VENOUS BLD VENIPUNCTURE: CPT | Performed by: INTERNAL MEDICINE

## 2025-07-14 PROCEDURE — 85025 COMPLETE CBC W/AUTO DIFF WBC: CPT | Performed by: INTERNAL MEDICINE

## 2025-07-14 PROCEDURE — 87507 IADNA-DNA/RNA PROBE TQ 12-25: CPT | Performed by: FAMILY MEDICINE

## 2025-07-14 RX ORDER — LOPERAMIDE HCL 1MG/7.5ML
2 LIQUID (ML) ORAL 3 TIMES DAILY
Status: DISCONTINUED | OUTPATIENT
Start: 2025-07-14 | End: 2025-07-14 | Stop reason: HOSPADM

## 2025-07-14 RX ORDER — LOPERAMIDE HYDROCHLORIDE 2 MG/1
2 CAPSULE ORAL 4 TIMES DAILY PRN
Status: DISCONTINUED | OUTPATIENT
Start: 2025-07-14 | End: 2025-07-14 | Stop reason: HOSPADM

## 2025-07-14 RX ORDER — POTASSIUM CHLORIDE 20 MEQ/1
40 TABLET, EXTENDED RELEASE ORAL ONCE
Status: DISCONTINUED | OUTPATIENT
Start: 2025-07-14 | End: 2025-07-14

## 2025-07-14 RX ORDER — AMOXICILLIN AND CLAVULANATE POTASSIUM 875; 125 MG/1; MG/1
1 TABLET, FILM COATED ORAL EVERY 12 HOURS
Qty: 14 TABLET | Refills: 0 | Status: SHIPPED | OUTPATIENT
Start: 2025-07-14 | End: 2025-07-14

## 2025-07-14 RX ORDER — AMOXICILLIN AND CLAVULANATE POTASSIUM 875; 125 MG/1; MG/1
1 TABLET, FILM COATED ORAL EVERY 12 HOURS
Qty: 14 TABLET | Refills: 0 | Status: SHIPPED | OUTPATIENT
Start: 2025-07-14 | End: 2025-07-21

## 2025-07-14 RX ADMIN — PANTOPRAZOLE SODIUM 40 MG: 40 TABLET, DELAYED RELEASE ORAL at 05:07

## 2025-07-14 RX ADMIN — LOPERAMIDE HYDROCHLORIDE 2 MG: 1 SOLUTION ORAL at 12:07

## 2025-07-14 RX ADMIN — GUAIFENESIN AND DEXTROMETHORPHAN 5 ML: 100; 10 SYRUP ORAL at 05:07

## 2025-07-14 RX ADMIN — ASPIRIN 81 MG: 81 TABLET ORAL at 08:07

## 2025-07-14 RX ADMIN — POTASSIUM BICARBONATE 35 MEQ: 391 TABLET, EFFERVESCENT ORAL at 05:07

## 2025-07-14 RX ADMIN — DULOXETINE 60 MG: 30 CAPSULE, DELAYED RELEASE ORAL at 08:07

## 2025-07-14 RX ADMIN — CEFTRIAXONE 2 G: 2 INJECTION, POWDER, FOR SOLUTION INTRAMUSCULAR; INTRAVENOUS at 08:07

## 2025-07-14 RX ADMIN — POTASSIUM BICARBONATE 35 MEQ: 391 TABLET, EFFERVESCENT ORAL at 08:07

## 2025-07-14 RX ADMIN — APIXABAN 5 MG: 5 TABLET, FILM COATED ORAL at 08:07

## 2025-07-14 RX ADMIN — AMLODIPINE BESYLATE 5 MG: 5 TABLET ORAL at 08:07

## 2025-07-14 RX ADMIN — LOPERAMIDE HYDROCHLORIDE 2 MG: 2 CAPSULE ORAL at 06:07

## 2025-07-14 RX ADMIN — Medication 800 MG: at 05:07

## 2025-07-14 NOTE — ASSESSMENT & PLAN NOTE
Patient's blood pressure range in the last 24 hours was: BP  Min: 97/63  Max: 157/76.The patient's inpatient anti-hypertensive regimen is listed below:  Current Antihypertensives  amLODIPine tablet 5 mg, Daily, Oral  hydrALAZINE injection 10 mg, Every 4 hours PRN, Intravenous

## 2025-07-14 NOTE — PLAN OF CARE
Problem: Adult Inpatient Plan of Care  Goal: Plan of Care Review  Outcome: Met  Goal: Patient-Specific Goal (Individualized)  Outcome: Met  Goal: Absence of Hospital-Acquired Illness or Injury  Outcome: Met  Goal: Optimal Comfort and Wellbeing  Outcome: Met  Goal: Readiness for Transition of Care  Outcome: Met     Problem: Pneumonia  Goal: Fluid Balance  Outcome: Met  Goal: Resolution of Infection Signs and Symptoms  Outcome: Met  Goal: Effective Oxygenation and Ventilation  Outcome: Met     Problem: Fall Injury Risk  Goal: Absence of Fall and Fall-Related Injury  Outcome: Met     Problem: Infection  Goal: Absence of Infection Signs and Symptoms  Outcome: Met

## 2025-07-14 NOTE — ASSESSMENT & PLAN NOTE
Abx as below     Antibiotics (From admission, onward)      Start     Stop Route Frequency Ordered    07/14/25 0900  cefTRIAXone injection 2 g         -- IV Every 24 hours (non-standard times) 07/13/25 1406            Microbiology Results (last 7 days)       Procedure Component Value Units Date/Time    Clostridium difficile EIA [[id number]]  (Normal) Collected: 07/14/25 0900    Order Status: Completed Specimen: Stool Updated: 07/14/25 1108     C. DIFFICILE GDH AG Negative     Clostridioides difficile Toxin A/B Negative     Comment: Testing not recommended for children <24 months old.       Blood culture #2 **CANNOT BE ORDERED STAT** [[id number]]  (Normal) Collected: 07/13/25 1314    Order Status: Completed Specimen: Blood from Peripheral, Antecubital, Left Updated: 07/13/25 2202     CULTURE, BLOOD ([facility]) No Growth After 6 Hours    Blood culture #1 **CANNOT BE ORDERED STAT** [[id number]]  (Normal) Collected: 07/13/25 1314    Order Status: Completed Specimen: Blood from Peripheral, Hand, Right Updated: 07/13/25 2102     CULTURE, BLOOD ([facility]) No Growth After 6 Hours    Group A Strep, Molecular [[id number]]  (Normal) Collected: 07/13/25 1008    Order Status: Completed Specimen: Throat Updated: 07/13/25 1124     Group A Strep Molecular Negative    Influenza A & B by Molecular [[id number]]  (Normal) Collected: 07/13/25 1008    Order Status: Completed Specimen: Nasal Swab Updated: 07/13/25 1114     INFLUENZA A MOLECULAR Negative     INFLUENZA B MOLECULAR  Negative

## 2025-07-14 NOTE — PLAN OF CARE
[facility]  Initial Discharge Assessment       Primary Care Provider: Angi Barrett NP    Admission Diagnosis: Acute pneumonia [J18.9]    Admission Date: 7/13/2025  Expected Discharge Date: 7/15/2025    SW met with patient bedside. SW verified demographics, insurance, supports, and PCP.  Patient reported she lives in the home with her spouse and drives self to appointments. SW assessed patient's needs. Patient is able to complete ADLs independently. Patient verified at home DME: none.  Patient verified No HH, No Dialysis, and No Oxygen. Patient verified she takes Eliquis for blood thinners.    Patient verified pharmacy of choice: [facility] [address]  Patient confirmed her spouse, Jensen, will be source of transportation at the time of discharge.     Transition of Care Barriers: None    Payor: BLUE CROSS BLUE SHIELD / Plan: [facility] [place] HMO / Product Type: HMO /     Extended Emergency Contact Information  Primary Emergency Contact: Jensen Mitchell  Address: [id number] [place], [place] [address] [address] [facility]  Home Phone: [phone]  Mobile Phone: [phone]  Relation: Spouse    Discharge Plan A: Home with family  Discharge Plan B: Home      [facility] - [place] - [address]  [address]  [facility] [address]  Phone: [phone] Fax: [phone]      Initial Assessment (most recent)       Adult Discharge Assessment - 07/14/25 1041          Discharge Assessment    Assessment Type Discharge Planning Assessment     Confirmed/corrected address, phone number and insurance Yes     Confirmed Demographics Correct on Facesheet     Source of Information patient     Communicated TYESHA with patient/caregiver Date not available/Unable to determine     People in Home spouse     Name(s) of People in Home Jensen Mitchell, spouse     Do you expect to return to your current living situation? Yes     Do you have help at home or someone to help you manage your  care at home? No     Prior to hospitilization cognitive status: Unable to Assess     Current cognitive status: Alert/Oriented     Walking or Climbing Stairs Difficulty no     Dressing/Bathing Difficulty no     Home Accessibility wheelchair accessible     Home Layout Able to live on 1st floor     Equipment Currently Used at Home none     Readmission within 30 days? No     Patient currently being followed by outpatient case management? No     Do you currently have service(s) that help you manage your care at home? No     Do you take prescription medications? Yes     Do you have prescription coverage? Yes     Do you have any problems affording any of your prescribed medications? No     Is the patient taking medications as prescribed? yes     Who is going to help you get home at discharge? spouse     How do you get to doctors appointments? car, drives self     Are you on dialysis? No     Do you take coumadin? No   Eliquis    Discharge Plan A Home with family     Discharge Plan B Home     DME Needed Upon Discharge  none     Discharge Plan discussed with: Patient     Transition of Care Barriers None

## 2025-07-14 NOTE — PLAN OF CARE
Problem: Adult Inpatient Plan of Care  Goal: Plan of Care Review  7/14/2025 0407 by Anabela Grace RN  Outcome: Progressing  7/14/2025 0357 by Anabela Grace RN  Outcome: Progressing  Goal: Patient-Specific Goal (Individualized)  7/14/2025 0407 by Anabela Grace RN  Outcome: Progressing  7/14/2025 0357 by Anabela Grace RN  Outcome: Progressing  Goal: Absence of Hospital-Acquired Illness or Injury  7/14/2025 0407 by Anabela Grace RN  Outcome: Progressing  7/14/2025 0357 by Anabela Grace RN  Outcome: Progressing  Goal: Optimal Comfort and Wellbeing  7/14/2025 0407 by Anabela Grace RN  Outcome: Progressing  7/14/2025 0357 by Anabela Grace RN  Outcome: Progressing  Goal: Readiness for Transition of Care  7/14/2025 0407 by Anabela Grace RN  Outcome: Progressing  7/14/2025 0357 by Anabela Grace RN  Outcome: Progressing     Problem: Pneumonia  Goal: Fluid Balance  7/14/2025 0407 by Anabela Grace RN  Outcome: Progressing  7/14/2025 0357 by Anabela Grace RN  Outcome: Progressing  Goal: Resolution of Infection Signs and Symptoms  7/14/2025 0407 by Anabela Grace RN  Outcome: Progressing  7/14/2025 0357 by Anabela Grace RN  Outcome: Progressing  Goal: Effective Oxygenation and Ventilation  7/14/2025 0407 by Anabela Grace RN  Outcome: Progressing  7/14/2025 0357 by Anabela Grace RN  Outcome: Progressing     Problem: Fall Injury Risk  Goal: Absence of Fall and Fall-Related Injury  7/14/2025 0407 by Anabela Grace RN  Outcome: Progressing  7/14/2025 0357 by Anabela Grace RN  Outcome: Progressing

## 2025-07-14 NOTE — CARE UPDATE
07/13/25 2110   Patient Assessment/Suction   Level of Consciousness (AVPU) alert   Respiratory Effort Unlabored   Expansion/Accessory Muscles/Retractions no use of accessory muscles   All Lung Fields Breath Sounds clear;diminished;equal bilaterally   Rhythm/Pattern, Respiratory no shortness of breath reported   Cough Frequency no cough   PRE-TX-O2   Device (Oxygen Therapy) nasal cannula   $ Is the patient on Low Flow Oxygen? Yes   Flow (L/min) (Oxygen Therapy) 2   SpO2 96 %   Pulse Oximetry Type Continuous   $ Pulse Oximetry - Multiple Charge Pulse Oximetry - Multiple   Positioning   Head of Bed (HOB) Positioning HOB elevated;HOB at 30 degrees

## 2025-07-14 NOTE — HOSPITAL COURSE
Patient is a 64-year-old female who was admitted for pneumonia.  Patient was placed on IV antibiotics.  GI panel and C diff ordered which came back negative.  Patient likely had a reaction from azithromycin since diarrhea started after azithromycin.  Patient was discharged with Augmentin and warned patient that this can also cause diarrhea.  Recommended that patient follow-up with PCP

## 2025-07-14 NOTE — DISCHARGE SUMMARY
[facility] Medicine  Discharge Summary      Patient Name: Jessica Mitchell  MRN: [id number]  MICHELLE: [id number]  Patient Class: OP- Observation  Admission Date: 7/13/2025  Hospital Length of Stay: 0 days  Discharge Date and Time: 07/14/2025 2:32 PM  Attending Physician: Lucero Michelle DO   Discharging Provider: Lucero Michelle DO  Primary Care Provider: Angi Barrett NP    Primary Care Team: Networked reference to record PCT     HPI:   64 year old pt getting admitted with acute pneumonia/Hypoxia  Pt and her  was ill with URTI like symptoms  Reported sinus congestion/post nasal drip like symptoms  Then started having fever/cough/SOB came to ER  In ER she was hypoxic for a while and got admitted     * No surgery found *      Hospital Course:   Patient is a 64-year-old female who was admitted for pneumonia.  Patient was placed on IV antibiotics.  GI panel and C diff ordered which came back negative.  Patient likely had a reaction from azithromycin since diarrhea started after azithromycin.  Patient was discharged with Augmentin and warned patient that this can also cause diarrhea.  Recommended that patient follow-up with PCP     Goals of Care Treatment Preferences:  Code Status: Full Code         Consults:     Assessment & Plan  Acute pneumonia  Abx as below     Antibiotics (From admission, onward)      Start     Stop Route Frequency Ordered    07/14/25 0900  cefTRIAXone injection 2 g         -- IV Every 24 hours (non-standard times) 07/13/25 1406            Microbiology Results (last 7 days)       Procedure Component Value Units Date/Time    Clostridium difficile EIA [[id number]]  (Normal) Collected: 07/14/25 0900    Order Status: Completed Specimen: Stool Updated: 07/14/25 1108     C. DIFFICILE GDH AG Negative     Clostridioides difficile Toxin A/B Negative     Comment: Testing not recommended for children <24 months old.       Blood culture #2 **CANNOT BE ORDERED STAT** [[id number]]   (Normal) Collected: 07/13/25 1314    Order Status: Completed Specimen: Blood from Peripheral, Antecubital, Left Updated: 07/13/25 2202     CULTURE, BLOOD ([facility]) No Growth After 6 Hours    Blood culture #1 **CANNOT BE ORDERED STAT** [[id number]]  (Normal) Collected: 07/13/25 1314    Order Status: Completed Specimen: Blood from Peripheral, Hand, Right Updated: 07/13/25 2102     CULTURE, BLOOD ([facility]) No Growth After 6 Hours    Group A Strep, Molecular [[id number]]  (Normal) Collected: 07/13/25 1008    Order Status: Completed Specimen: Throat Updated: 07/13/25 1124     Group A Strep Molecular Negative    Influenza A & B by Molecular [[id number]]  (Normal) Collected: 07/13/25 1008    Order Status: Completed Specimen: Nasal Swab Updated: 07/13/25 1114     INFLUENZA A MOLECULAR Negative     INFLUENZA B MOLECULAR  Negative          Obesity (BMI 30.0-34.9)  Vadim therapeutic life style measures    HTN (hypertension)  Patient's blood pressure range in the last 24 hours was: BP  Min: 97/63  Max: 157/76.The patient's inpatient anti-hypertensive regimen is listed below:  Current Antihypertensives  amLODIPine tablet 5 mg, Daily, Oral  hydrALAZINE injection 10 mg, Every 4 hours PRN, Intravenous      Coagulation disorder  Maintain NOAC     Hypoxia  Transient hypoxia from pneumonia    Final Active Diagnoses:    Diagnosis Date Noted POA    PRINCIPAL PROBLEM:  Acute pneumonia [J18.9] 07/13/2025 Yes    Hypoxia [R09.02] 07/13/2025 Yes    HTN (hypertension) [I10]  Yes    Obesity (BMI 30.0-34.9) [E66.811] 11/25/2015 Yes    Coagulation disorder [D68.9] 09/19/2014 Yes      Problems Resolved During this Admission:       Discharged Condition: fair    Disposition: Home or Self Care    Follow Up:   Follow-up Information       Angi Barrett NP Follow up in 1 week(s).    Specialty: Family Medicine  Why: Message sent to office requesting appointment. The office will call to schedule. Please contact the office if you do not receive a call within 1-2  days  Contact information:  [address]  [address]  [place] [address]  [phone]                           Patient Instructions:   No discharge procedures on file.    Significant Diagnostic Studies: Labs: BMP:   Recent Labs   Lab 07/13/25 1215 07/14/25 0228    98   * 134*   K 3.5 3.4*   CL 98 99   CO2 26 24   BUN 9 9   CREATININE 0.7 0.6   CALCIUM 8.5* 8.2*   MG  --  1.7   , CMP   Recent Labs   Lab 07/13/25 1215 07/14/25 0228   * 134*   K 3.5 3.4*   CL 98 99   CO2 26 24    98   BUN 9 9   CREATININE 0.7 0.6   CALCIUM 8.5* 8.2*   PROT 7.4 6.9   ALBUMIN 3.8 3.7   BILITOT 0.4 0.5   ALKPHOS 102 100   AST 45* 46*   ALT 44 46*   ANIONGAP 11 11   , and CBC   Recent Labs   Lab 07/13/25 1215 07/14/25 0228   WBC 6.71 6.56   HGB 14.4 13.9   HCT 42.7 43.4    218       Pending Diagnostic Studies:       Procedure Component Value Units Date/Time    EXTRA TUBES [[id number]] Collected: 07/13/25 1215    Order Status: Sent Lab Status: In process Updated: 07/13/25 1221    Specimen: Blood, Venous     Narrative:      The following orders were created for panel order EXTRA TUBES.  Procedure                               Abnormality         Status                     ---------                               -----------         ------                     Light Blue Top Hold[[id number]]                             In process                   Please view results for these tests on the individual orders.           Medications:  Reconciled Home Medications:      Medication List        START taking these medications      amoxicillin-clavulanate 875-125mg 875-125 mg per tablet  Commonly known as: AUGMENTIN  Take 1 tablet by mouth every 12 (twelve) hours. for 7 days            CONTINUE taking these medications      amLODIPine 5 MG tablet  Commonly known as: NORVASC  Take 1 tablet (5 mg total) by mouth once daily.     apixaban 5 mg Tab  Commonly known as: ELIQUIS  Take 1 tablet (5 mg total) by mouth 2  (two) times daily.     aspirin 81 MG EC tablet  Commonly known as: ECOTRIN  Take 81 mg by mouth once daily. Only takes when out of Eliquis     ciclopirox 8 % Soln  Commonly known as: PENLAC  Apply topically nightly.     cyclobenzaprine 10 MG tablet  Commonly known as: FLEXERIL  Take 1 tablet (10 mg total) by mouth 3 (three) times daily as needed for Muscle spasms.     DULoxetine 30 MG capsule  Commonly known as: CYMBALTA  Take 2 capsules (60 mg total) by mouth once daily.     HYDROcodone-acetaminophen 5-325 mg per tablet  Commonly known as: NORCO  Take 1 tablet by mouth every 12 (twelve) hours as needed for Pain.     linaCLOtide 145 mcg Cap capsule  Commonly known as: LINZESS  Take 145 mcg by mouth as needed.     pantoprazole 40 MG tablet  Commonly known as: PROTONIX  Take 1 tablet (40 mg total) by mouth once daily.     pravastatin 40 MG tablet  Commonly known as: PRAVACHOL  Take 1 tablet (40 mg total) by mouth once daily.              Indwelling Lines/Drains at time of discharge:   Lines/Drains/Airways       None                       Time spent on the discharge of patient: 32 minutes         Lucero Michelle DO  Department of Hospital Medicine  [facility]

## 2025-07-14 NOTE — PLAN OF CARE
Pt clear for DC from case management standpoint. Discharging to home. Patient reports her spouse will provide transportation home.     In basket message sent to PCP's office requesting appointment. Office will call to schedule.        07/14/25 8382   Final Note   Assessment Type Final Discharge Note   Anticipated Discharge Disposition Home   Hospital Resources/Appts/Education Provided Appointment suggestion unavailable

## 2025-07-14 NOTE — CARE UPDATE
07/14/25 0650   Patient Assessment/Suction   Level of Consciousness (AVPU) alert   Respiratory Effort Normal;Unlabored   Expansion/Accessory Muscles/Retractions no use of accessory muscles;expansion symmetric   All Lung Fields Breath Sounds equal bilaterally;clear   Rhythm/Pattern, Respiratory unlabored;pattern regular;depth regular   PRE-TX-O2   Device (Oxygen Therapy) room air   SpO2 98 %   Pulse Oximetry Type Intermittent   $ Pulse Oximetry - Multiple Charge Pulse Oximetry - Multiple   Education   $ Education Oxygen;15 min

## 2025-07-15 ENCOUNTER — TELEPHONE (OUTPATIENT)
Dept: FAMILY MEDICINE | Facility: CLINIC | Age: 65
End: 2025-07-15
Payer: COMMERCIAL

## 2025-07-15 ENCOUNTER — PATIENT OUTREACH (OUTPATIENT)
Dept: FAMILY MEDICINE | Facility: CLINIC | Age: 65
End: 2025-07-15
Payer: COMMERCIAL

## 2025-07-15 NOTE — TELEPHONE ENCOUNTER
Spoke with patient who states she does not want to see anyone except Angi for her HFU - Angi does not have anything available next week but wants to know if she can be worked in.

## 2025-07-15 NOTE — PROGRESS NOTES
Discharge Information     Discharge Date:   7/14/25    Primary Discharge Diagnosis:  PNA      Discharge Summary:  Reviewed      Medication & Order Review     Were medication changes made or new medications added?   Yes    If so, has the patient filled the prescriptions?  Yes     Was Home Health ordered? No    If so, has Home Health contacted patient and/or initiated services?  No    Name of Home Health Agency? N/A    Durable Medical Equipment ordered?  No     If so, has the DME provider contacted patient and delivered equipment?  N/A    Follow Up               Any problems since discharge? No    How is the patient feeling since returning home?      Have you set up recommended follow up appointments?  (cardiology, surgery, etc.)    Schedule Hospital Follow-up appointment within 7-14 days (preferably 7).      Notes:  see encounter - patient refused to get scheduled for HFU. Wants to see Angi only.             Maria R Almanza

## 2025-07-15 NOTE — TELEPHONE ENCOUNTER
----- Message from Nurse Hi sent at 7/15/2025  8:30 AM CDT -----  Call patient - needs post-hospital phone call within 2 business days and hospital follow up visit scheduled within 7-14 days.

## 2025-07-15 NOTE — TELEPHONE ENCOUNTER
Spoke to patient via phone. Attempted to schedule hospital follow up visit. Offered patient appointment with Ann Sharp on 7/22. Patient states she would like to see Angi Barrett. I did advise that her soonest available hospital follow up is August 14th. Patient states she will contact Arnold's office directly to schedule hospital follow up.

## 2025-07-15 NOTE — TELEPHONE ENCOUNTER
----- Message from  Maribell sent at 7/14/2025 11:21 AM CDT -----  Regarding: hospital follow up  Date: 07/14/2025      Patient: Jessica Micthell  YOB: 1960    RE: [facility] Hospital Admission 7/13/25    Admit Diagnosis: Acute pneumonia       Jessica Mitchell is being discharged from the hospital today.     [ ] The patient does not currently have a primary care provider.     [ ] The patient has a diagnosis of congestive heart failure.     [ ] Other:       Transportation Needs:  @[facility]@        A follow-up appointment has been recommended within 3 days of discharge to support post-discharge management and reduce risk of admission.       Sincerely,   Maribell Alicea

## 2025-07-15 NOTE — TELEPHONE ENCOUNTER
[facility]  Caller: Unspecified (Today,  9:47 AM)  Patient states she was discharged from [facility] yesterday. Admitted on Sunday. patient needing a hospital f/u for next week.  [phone]

## 2025-07-18 ENCOUNTER — PATIENT MESSAGE (OUTPATIENT)
Dept: ADMINISTRATIVE | Facility: CLINIC | Age: 65
End: 2025-07-18
Payer: COMMERCIAL

## 2025-07-18 ENCOUNTER — PATIENT OUTREACH (OUTPATIENT)
Dept: ADMINISTRATIVE | Facility: CLINIC | Age: 65
End: 2025-07-18
Payer: COMMERCIAL

## 2025-07-18 LAB
BACTERIA BLD CULT: NORMAL
BACTERIA BLD CULT: NORMAL

## 2025-07-18 NOTE — PROGRESS NOTES
C3 nurse attempted to contact Jessica Mitchell for a TCC post hospital discharge follow up call. No answer. LVM requesting a callback at [phone].    The patient does not have a scheduled HOSFU appointment. Message sent to PCP's staff to assist with HOSFU appointment scheduling.

## 2025-07-21 ENCOUNTER — HOSPITAL ENCOUNTER (OUTPATIENT)
Dept: RADIOLOGY | Facility: HOSPITAL | Age: 65
Discharge: HOME | End: 2025-07-21
Attending: NURSE PRACTITIONER
Payer: COMMERCIAL

## 2025-07-21 ENCOUNTER — OFFICE VISIT (OUTPATIENT)
Dept: FAMILY MEDICINE | Facility: CLINIC | Age: 65
End: 2025-07-21
Payer: COMMERCIAL

## 2025-07-21 VITALS
HEART RATE: 95 BPM | WEIGHT: 204.38 LBS | OXYGEN SATURATION: 95 % | HEIGHT: 64 IN | SYSTOLIC BLOOD PRESSURE: 116 MMHG | BODY MASS INDEX: 34.89 KG/M2 | DIASTOLIC BLOOD PRESSURE: 76 MMHG

## 2025-07-21 DIAGNOSIS — M54.50 LOW BACK PAIN, UNSPECIFIED BACK PAIN LATERALITY, UNSPECIFIED CHRONICITY, UNSPECIFIED WHETHER SCIATICA PRESENT: ICD-10-CM

## 2025-07-21 DIAGNOSIS — K21.9 GASTROESOPHAGEAL REFLUX DISEASE, UNSPECIFIED WHETHER ESOPHAGITIS PRESENT: ICD-10-CM

## 2025-07-21 DIAGNOSIS — D68.9 COAGULATION DISORDER: ICD-10-CM

## 2025-07-21 DIAGNOSIS — Z79.899 HIGH RISK MEDICATION USE: ICD-10-CM

## 2025-07-21 DIAGNOSIS — E78.2 MIXED HYPERLIPIDEMIA: ICD-10-CM

## 2025-07-21 DIAGNOSIS — J15.69 PNEUMONIA OF LEFT LOWER LOBE DUE TO OTHER AEROBIC GRAM-NEGATIVE BACTERIA: ICD-10-CM

## 2025-07-21 DIAGNOSIS — I10 HYPERTENSION, UNSPECIFIED TYPE: ICD-10-CM

## 2025-07-21 DIAGNOSIS — Z09 HOSPITAL DISCHARGE FOLLOW-UP: Primary | ICD-10-CM

## 2025-07-21 DIAGNOSIS — Z13.820 SCREENING FOR OSTEOPOROSIS: ICD-10-CM

## 2025-07-21 PROCEDURE — 99496 TRANSJ CARE MGMT HIGH F2F 7D: CPT | Mod: S$GLB,,, | Performed by: NURSE PRACTITIONER

## 2025-07-21 PROCEDURE — 71046 X-RAY EXAM CHEST 2 VIEWS: CPT | Mod: TC,PO

## 2025-07-21 PROCEDURE — 71046 X-RAY EXAM CHEST 2 VIEWS: CPT | Mod: 26,,, | Performed by: RADIOLOGY

## 2025-07-21 PROCEDURE — 1160F RVW MEDS BY RX/DR IN RCRD: CPT | Mod: CPTII,S$GLB,, | Performed by: NURSE PRACTITIONER

## 2025-07-21 PROCEDURE — 1159F MED LIST DOCD IN RCRD: CPT | Mod: CPTII,S$GLB,, | Performed by: NURSE PRACTITIONER

## 2025-07-21 PROCEDURE — 3078F DIAST BP <80 MM HG: CPT | Mod: CPTII,S$GLB,, | Performed by: NURSE PRACTITIONER

## 2025-07-21 PROCEDURE — 3074F SYST BP LT 130 MM HG: CPT | Mod: CPTII,S$GLB,, | Performed by: NURSE PRACTITIONER

## 2025-07-21 RX ORDER — HYDROCODONE BITARTRATE AND ACETAMINOPHEN 5; 325 MG/1; MG/1
1 TABLET ORAL EVERY 12 HOURS PRN
Qty: 60 TABLET | Refills: 0 | Status: SHIPPED | OUTPATIENT
Start: 2025-08-07

## 2025-07-21 RX ORDER — PANTOPRAZOLE SODIUM 40 MG/1
40 TABLET, DELAYED RELEASE ORAL DAILY
Qty: 90 TABLET | Refills: 1 | Status: SHIPPED | OUTPATIENT
Start: 2025-07-21 | End: 2025-08-07 | Stop reason: SDUPTHER

## 2025-07-21 RX ORDER — TIZANIDINE HYDROCHLORIDE 4 MG/1
CAPSULE, GELATIN COATED ORAL
COMMUNITY

## 2025-07-21 RX ORDER — DULOXETIN HYDROCHLORIDE 30 MG/1
60 CAPSULE, DELAYED RELEASE ORAL DAILY
Qty: 180 CAPSULE | Refills: 0 | Status: SHIPPED | OUTPATIENT
Start: 2025-07-21

## 2025-07-21 RX ORDER — CYCLOBENZAPRINE HCL 10 MG
10 TABLET ORAL 3 TIMES DAILY PRN
Qty: 90 TABLET | Refills: 0 | Status: SHIPPED | OUTPATIENT
Start: 2025-07-21

## 2025-07-21 RX ORDER — AMLODIPINE BESYLATE 5 MG/1
5 TABLET ORAL DAILY
Qty: 90 TABLET | Refills: 1 | Status: SHIPPED | OUTPATIENT
Start: 2025-07-21 | End: 2025-08-07 | Stop reason: SDUPTHER

## 2025-07-21 RX ORDER — PRAVASTATIN SODIUM 40 MG/1
40 TABLET ORAL DAILY
Qty: 90 TABLET | Refills: 3 | Status: SHIPPED | OUTPATIENT
Start: 2025-07-21 | End: 2025-08-07 | Stop reason: SDUPTHER

## 2025-07-22 ENCOUNTER — TELEPHONE (OUTPATIENT)
Dept: FAMILY MEDICINE | Facility: CLINIC | Age: 65
End: 2025-07-22
Payer: COMMERCIAL

## 2025-07-22 RX ORDER — BENZONATATE 200 MG/1
200 CAPSULE ORAL 3 TIMES DAILY PRN
Qty: 30 CAPSULE | Refills: 0 | Status: SHIPPED | OUTPATIENT
Start: 2025-07-22 | End: 2025-08-01

## 2025-07-22 RX ORDER — AMOXICILLIN AND CLAVULANATE POTASSIUM 875; 125 MG/1; MG/1
1 TABLET, FILM COATED ORAL EVERY 12 HOURS
Qty: 10 TABLET | Refills: 0 | Status: SHIPPED | OUTPATIENT
Start: 2025-07-22 | End: 2025-07-27

## 2025-07-22 NOTE — TELEPHONE ENCOUNTER
Chest xray shows resolution of pneumonia. Will send in 5 additional days of antibiotic and tessalon

## 2025-07-27 ENCOUNTER — PATIENT MESSAGE (OUTPATIENT)
Dept: FAMILY MEDICINE | Facility: CLINIC | Age: 65
End: 2025-07-27
Payer: COMMERCIAL

## 2025-08-04 NOTE — PROGRESS NOTES
SUBJECTIVE:    Patient ID: Jessica Mitchell is a 64 y.o. female.    Chief Complaint: Hospital Follow Up (Bottles brought//Pt is here for a hospital follow up on 7/13/25 for cough and pneumonia-states still having the cough with phlegm that is between white and yellow-would like a refill on the antibiotic that she was given, requesting something for the cough as well//Declined Mammo, Pap//Declined colo for now//Dexa ordered//KE)    History of Present Illness  64 year old female presents today for hospital follow up. She presents alone. She reports feeling better but still has productive cough. Does not feel great still. Wants to discuss getting another antibiotic. Back pain has resolved    HOSPITAL:    64 year old pt getting admitted with acute pneumonia/Hypoxia  Pt and her  was ill with URTI like symptoms  Reported sinus congestion/post nasal drip like symptoms  Then started having fever/cough/SOB came to ER  In ER she was hypoxic for a while and got admitted      * No surgery found *       Hospital Course:   Patient is a 64-year-old female who was admitted for pneumonia.  Patient was placed on IV antibiotics.  GI panel and C diff ordered which came back negative.  Patient likely had a reaction from azithromycin since diarrhea started after azithromycin.  Patient was discharged with Augmentin and warned patient that this can also cause diarrhea.  Recommended that patient follow-up with PCP      Review of Systems   Constitutional:  Negative for chills, fever and unexpected weight change.   HENT:  Negative for ear pain, rhinorrhea and sore throat.    Eyes:  Negative for pain and visual disturbance.   Respiratory:  Positive for cough and shortness of breath.    Cardiovascular:  Negative for chest pain, palpitations and leg swelling.   Gastrointestinal:  Negative for abdominal pain, diarrhea, nausea and vomiting.   Genitourinary:  Negative for difficulty urinating, hematuria and vaginal bleeding.    Musculoskeletal:  Negative for arthralgias.   Skin:  Negative for rash.   Neurological:  Negative for dizziness, weakness and headaches.   Psychiatric/Behavioral:  Negative for agitation and sleep disturbance. The patient is not nervous/anxious.          Admit Date: 07/13/25  Discharge Date: 07/14/25  Discharge Facility: Hospital      Family and/or Caretaker present at visit? No  Medication Reconciliation:  Medications changed/added/deleted. Meds reconciled  New Prescriptions filled after discharge: yes  Discharge summary reviewed:  no  Diagnostic tests reviewed/disposition: No diagnosic tests pending after this hospitalization  Disease/illness education: y  Follow up appointments scheduled:  not applicable              n/a  Follow up labs/tests ordered:   yes  Home Health ordered on discharge: Patient does not have home health established from hospital visit.  They do not need home health.  If needed, we will set up home health for the patient  Home Health company name: [employer]  Establishment or re-establishment of referral orders for community resources: No other necessary community resources  DME ordered at discharge:   no  How patient is feeling since discharge from the hospital?  see above     Discussion with other health care providers: No discussion with other health care providers necessary  Patient follow up phone call documented on separate encounter.    Admission on 07/13/2025, Discharged on 07/14/2025   Component Date Value Ref Range Status    INFLUENZA A MOLECULAR 07/13/2025 Negative  Negative Final    INFLUENZA B MOLECULAR  07/13/2025 Negative  Negative Final    SARS COV-2 Molecular 07/13/2025 Negative  Negative Final    Group A Strep Molecular 07/13/2025 Negative  Negative Final    Sodium 07/13/2025 135 (L)  136 - 145 mmol/L Final    Potassium 07/13/2025 3.5  3.5 - 5.1 mmol/L Final    Chloride 07/13/2025 98  95 - 110 mmol/L Final    CO2 07/13/2025 26  23 - 29 mmol/L Final    Glucose 07/13/2025 103  70 -  110 mg/dL Final    BUN 07/13/2025 9  8 - 23 mg/dL Final    Creatinine 07/13/2025 0.7  0.5 - 1.4 mg/dL Final    Calcium 07/13/2025 8.5 (L)  8.7 - 10.5 mg/dL Final    Protein Total 07/13/2025 7.4  6.0 - 8.4 gm/dL Final    Albumin 07/13/2025 3.8  3.5 - 5.2 g/dL Final    Bilirubin Total 07/13/2025 0.4  0.1 - 1.0 mg/dL Final    ALP 07/13/2025 102  55 - 135 unit/L Final    AST 07/13/2025 45 (H)  10 - 40 unit/L Final    ALT 07/13/2025 44  10 - 44 unit/L Final    Anion Gap 07/13/2025 11  8 - 16 mmol/L Final    eGFR 07/13/2025 >60  >60 mL/min/1.73/m2 Final    CULTURE, BLOOD ([facility]) 07/13/2025 No Growth After 5 Days   Final    CULTURE, BLOOD ([facility]) 07/13/2025 No Growth After 5 Days   Final    WBC 07/13/2025 6.71  3.90 - 12.70 K/uL Final    RBC 07/13/2025 5.11  4.00 - 5.40 M/uL Final    Hgb 07/13/2025 14.4  12.0 - 16.0 gm/dL Final    Hct 07/13/2025 42.7  37.0 - 48.5 % Final    MCV 07/13/2025 84  82 - 98 fL Final    MCH 07/13/2025 28.2  27.0 - 31.0 pg Final    MCHC 07/13/2025 33.7  32.0 - 36.0 g/dL Final    RDW 07/13/2025 13.2  11.5 - 14.5 % Final    Platelet Count 07/13/2025 225  150 - 450 K/uL Final    MPV 07/13/2025 10.7  9.2 - 12.9 fL Final    Nucleated RBC 07/13/2025 0  <=0 /100 WBC Final    Neut % 07/13/2025 76.5 (H)  38 - 73 % Final    Lymph % 07/13/2025 17.4 (L)  18 - 48 % Final    Mono % 07/13/2025 5.4  4 - 15 % Final    Eos % 07/13/2025 0.0  0 - 8 % Final    Basophil % 07/13/2025 0.4  <=1.9 % Final    Imm Grans % 07/13/2025 0.3  0.0 - 0.5 % Final    Neut # 07/13/2025 5.1  1.8 - 7.7 K/uL Final    Lymph # 07/13/2025 1.17  1 - 4.8 K/uL Final    Mono # 07/13/2025 0.36  0.3 - 1 K/uL Final    Eos # 07/13/2025 0.00  <=0.5 K/uL Final    Baso # 07/13/2025 0.03  <=0.2 K/uL Final    Imm Grans # 07/13/2025 0.02  0.00 - 0.04 K/uL Final    POC Lactate 07/13/2025 0.67  0.5 - 2.2 mmol/L Final    Sample 07/13/2025 VENOUS   Final    Sodium 07/14/2025 134 (L)  136 - 145 mmol/L Final    Potassium 07/14/2025 3.4 (L)  3.5 - 5.1 mmol/L Final     Chloride 07/14/2025 99  95 - 110 mmol/L Final    CO2 07/14/2025 24  23 - 29 mmol/L Final    Glucose 07/14/2025 98  70 - 110 mg/dL Final    BUN 07/14/2025 9  8 - 23 mg/dL Final    Creatinine 07/14/2025 0.6  0.5 - 1.4 mg/dL Final    Calcium 07/14/2025 8.2 (L)  8.7 - 10.5 mg/dL Final    Protein Total 07/14/2025 6.9  6.0 - 8.4 gm/dL Final    Albumin 07/14/2025 3.7  3.5 - 5.2 g/dL Final    Bilirubin Total 07/14/2025 0.5  0.1 - 1.0 mg/dL Final    ALP 07/14/2025 100  55 - 135 unit/L Final    AST 07/14/2025 46 (H)  10 - 40 unit/L Final    ALT 07/14/2025 46 (H)  10 - 44 unit/L Final    Anion Gap 07/14/2025 11  8 - 16 mmol/L Final    eGFR 07/14/2025 >60  >60 mL/min/1.73/m2 Final    Magnesium 07/14/2025 1.7  1.6 - 2.6 mg/dL Final    WBC 07/14/2025 6.56  3.90 - 12.70 K/uL Final    RBC 07/14/2025 4.96  4.00 - 5.40 M/uL Final    Hgb 07/14/2025 13.9  12.0 - 16.0 gm/dL Final    Hct 07/14/2025 43.4  37.0 - 48.5 % Final    MCV 07/14/2025 88  82 - 98 fL Final    MCH 07/14/2025 28.0  27.0 - 31.0 pg Final    MCHC 07/14/2025 32.0  32.0 - 36.0 g/dL Final    RDW 07/14/2025 13.2  11.5 - 14.5 % Final    Platelet Count 07/14/2025 218  150 - 450 K/uL Final    MPV 07/14/2025 9.9  9.2 - 12.9 fL Final    Nucleated RBC 07/14/2025 0  <=0 /100 WBC Final    Neut % 07/14/2025 75.0 (H)  38 - 73 % Final    Lymph % 07/14/2025 18.9  18 - 48 % Final    Mono % 07/14/2025 5.2  4 - 15 % Final    Eos % 07/14/2025 0.0  0 - 8 % Final    Basophil % 07/14/2025 0.6  <=1.9 % Final    Imm Grans % 07/14/2025 0.3  0.0 - 0.5 % Final    Neut # 07/14/2025 4.9  1.8 - 7.7 K/uL Final    Lymph # 07/14/2025 1.24  1 - 4.8 K/uL Final    Mono # 07/14/2025 0.34  0.3 - 1 K/uL Final    Eos # 07/14/2025 0.00  <=0.5 K/uL Final    Baso # 07/14/2025 0.04  <=0.2 K/uL Final    Imm Grans # 07/14/2025 0.02  0.00 - 0.04 K/uL Final    CAMPYLOBACTER 07/14/2025 Not Detected  Not Detected Final    PLESIOMONAS SHIGELLOIDES 07/14/2025 Not Detected  Not Detected Final    SALMONELLA 07/14/2025  Not Detected  Not Detected Final    Vibrio sp. 07/14/2025 Not Detected  Not Detected Final    VIBRIO CHOLERAE 07/14/2025 Not Detected  Not Detected Final    YERSINIA ENTEROCOLITICA 07/14/2025 Not Detected  Not Detected Final    ENTEROAGGREGATIVE E. COLI (EAEC) 07/14/2025 Not Detected  Not Detected Final    ENTEROPATHOGENIC E. COLI (EPEC) 07/14/2025 Not Detected  Not Detected Final    Enterotoxigenic E. coli (ETEC) 07/14/2025 Not Detected  Not Detected Final    SHIGA-LIKE TOXIN-PRODUCING E. COLI* 07/14/2025 Not Detected  Not Detected Final    Shigella/Enteroinvasive E. coli (E* 07/14/2025 Not Detected  Not Detected Final    CRYPTOSPORIDIUM 07/14/2025 Not Detected  Not Detected Final    Cyclospora cayetanensis 07/14/2025 Not Detected  Not Detected Final    Entamoeba histolytica 07/14/2025 Not Detected  Not Detected Final    Giardia lamblia 07/14/2025 Not Detected  Not Detected Final    Adenovirus F 40/41 07/14/2025 Not Detected  Not Detected Final    Astrovirus 07/14/2025 Not Detected  Not Detected Final    Norovirus GI/GII 07/14/2025 Not Detected  Not Detected Final    Rotavirus A 07/14/2025 Not Detected  Not Detected Final    Sapovirus 07/14/2025 Not Detected  Not Detected Final    C. DIFFICILE GDH AG 07/14/2025 Negative  Negative Final    Clostridioides difficile Toxin A/B 07/14/2025 Negative  Negative Final       Past Medical History:   Diagnosis Date    Adrenal tumor     DVT (deep venous thrombosis) 2012    Hiatal hernia     HTN (hypertension)     Multiple thyroid nodules     two    Stomach ulcer     Stroke     at 31 years old     Past Surgical History:   Procedure Laterality Date    Bilatweral Tubal ligation      ESOPHAGEAL DILATION       Family History   Problem Relation Name Age of Onset    Hypertension Mother         Marital Status:   Alcohol History:  reports current alcohol use.  Tobacco History:  reports that she has quit smoking. She has been exposed to tobacco smoke. She has never used smokeless  "tobacco.  Drug History:  reports no history of drug use.    Review of patient's allergies indicates:   Allergen Reactions    Codeine Itching    Codeine sulfate      Other reaction(s): Unknown    Lisinopril Other (See Comments)     cough  Other reaction(s): Unknown     Current Medications[1]  Objective:      Vitals:    07/21/25 1131   BP: 116/76   Pulse: 95   SpO2: 95%   Weight: 92.7 kg (204 lb 6.4 oz)   Height: 5' 4" (1.626 m)     Physical Exam  Vitals and nursing note reviewed.   Constitutional:       General: She is not in acute distress.     Appearance: Normal appearance. She is well-developed. She is obese.   HENT:      Head: Normocephalic.      Right Ear: External ear normal.      Left Ear: External ear normal.      Nose: Congestion present.   Neck:      Vascular: No JVD.   Cardiovascular:      Rate and Rhythm: Normal rate and regular rhythm.      Heart sounds: No murmur heard.  Pulmonary:      Effort: Pulmonary effort is normal.      Breath sounds: Wheezing present.   Abdominal:      General: Bowel sounds are normal.      Palpations: Abdomen is soft.   Musculoskeletal:         General: No deformity. Normal range of motion.      Cervical back: Normal range of motion and neck supple.   Lymphadenopathy:      Cervical: No cervical adenopathy.   Skin:     General: Skin is warm and dry.      Findings: No rash.   Neurological:      Mental Status: She is alert and oriented to person, place, and time.      Gait: Gait normal.   Psychiatric:         Speech: Speech normal.         Behavior: Behavior normal.           Assessment:       Assessment & Plan            Plan:       Hospital discharge follow-up  Comments:  discharge summary reviewed    Screening for osteoporosis  -     DXA Bone Density Axial Skeleton 1 or more sites; Future; Expected date: 07/21/2025    Hypertension, unspecified type  Comments:  continue norvasc  Orders:  -     amLODIPine (NORVASC) 5 MG tablet; Take 1 tablet (5 mg total) by mouth once daily.  " Dispense: 90 tablet; Refill: 1    Coagulation disorder  Comments:  continue eliquis  Orders:  -     apixaban (ELIQUIS) 5 mg Tab; Take 1 tablet (5 mg total) by mouth 2 (two) times daily.  Dispense: 180 tablet; Refill: 2    Low back pain, unspecified back pain laterality, unspecified chronicity, unspecified whether sciatica present  Comments:  continue norco.flexeril prn dr. thornton will see patient  Orders:  -     cyclobenzaprine (FLEXERIL) 10 MG tablet; Take 1 tablet (10 mg total) by mouth 3 (three) times daily as needed for Muscle spasms.  Dispense: 90 tablet; Refill: 0    Gastroesophageal reflux disease, unspecified whether esophagitis present  Comments:  continue protonix  Orders:  -     pantoprazole (PROTONIX) 40 MG tablet; Take 1 tablet (40 mg total) by mouth once daily.  Dispense: 90 tablet; Refill: 1    Mixed hyperlipidemia  Comments:  continue pravastatin  Orders:  -     pravastatin (PRAVACHOL) 40 MG tablet; Take 1 tablet (40 mg total) by mouth once daily.  Dispense: 90 tablet; Refill: 3    High risk medication use  -     pravastatin (PRAVACHOL) 40 MG tablet; Take 1 tablet (40 mg total) by mouth once daily.  Dispense: 90 tablet; Refill: 3    Pneumonia of left lower lobe due to other aerobic gram-negative bacteria  Comments:  repeat xray  Orders:  -     X-Ray Chest PA And Lateral; Future; Expected date: 07/21/2025    Other orders  -     DULoxetine (CYMBALTA) 30 MG capsule; Take 2 capsules (60 mg total) by mouth once daily.  Dispense: 180 capsule; Refill: 0      Follow up in about 4 weeks (around 8/18/2025), or if symptoms worsen or fail to improve, for medication management.    This note was generated with the assistance of ambient listening technology. Verbal consent was obtained by the patient and accompanying visitor(s) for the recording of patient appointment to facilitate this note. I attest to having reviewed and edited the generated note for accuracy, though some syntax or spelling errors may persist.  Please contact the author of this note for any clarification.             [1]   Current Outpatient Medications:     aspirin (ECOTRIN) 81 MG EC tablet, Take 81 mg by mouth once daily. Only takes when out of Eliquis, Disp: , Rfl:     ciclopirox (PENLAC) 8 % Soln, Apply topically nightly., Disp: 6.6 mL, Rfl: 2    linaCLOtide (LINZESS) 145 mcg Cap capsule, Take 145 mcg by mouth as needed., Disp: , Rfl:     tiZANidine 4 mg Cap, Take by mouth., Disp: , Rfl:     amLODIPine (NORVASC) 5 MG tablet, Take 1 tablet (5 mg total) by mouth once daily., Disp: 90 tablet, Rfl: 1    apixaban (ELIQUIS) 5 mg Tab, Take 1 tablet (5 mg total) by mouth 2 (two) times daily., Disp: 180 tablet, Rfl: 2    cyclobenzaprine (FLEXERIL) 10 MG tablet, Take 1 tablet (10 mg total) by mouth 3 (three) times daily as needed for Muscle spasms., Disp: 90 tablet, Rfl: 0    DULoxetine (CYMBALTA) 30 MG capsule, Take 2 capsules (60 mg total) by mouth once daily., Disp: 180 capsule, Rfl: 0    [START ON 8/7/2025] HYDROcodone-acetaminophen (NORCO) 5-325 mg per tablet, Take 1 tablet by mouth every 12 (twelve) hours as needed for Pain., Disp: 60 tablet, Rfl: 0    pantoprazole (PROTONIX) 40 MG tablet, Take 1 tablet (40 mg total) by mouth once daily., Disp: 90 tablet, Rfl: 1    pravastatin (PRAVACHOL) 40 MG tablet, Take 1 tablet (40 mg total) by mouth once daily., Disp: 90 tablet, Rfl: 3

## 2025-08-07 DIAGNOSIS — K21.9 GASTROESOPHAGEAL REFLUX DISEASE, UNSPECIFIED WHETHER ESOPHAGITIS PRESENT: ICD-10-CM

## 2025-08-07 DIAGNOSIS — M54.50 LOW BACK PAIN, UNSPECIFIED BACK PAIN LATERALITY, UNSPECIFIED CHRONICITY, UNSPECIFIED WHETHER SCIATICA PRESENT: ICD-10-CM

## 2025-08-07 DIAGNOSIS — E78.2 MIXED HYPERLIPIDEMIA: ICD-10-CM

## 2025-08-07 DIAGNOSIS — I10 HYPERTENSION, UNSPECIFIED TYPE: ICD-10-CM

## 2025-08-07 DIAGNOSIS — D68.9 COAGULATION DISORDER: ICD-10-CM

## 2025-08-07 DIAGNOSIS — Z79.899 HIGH RISK MEDICATION USE: ICD-10-CM

## 2025-08-07 RX ORDER — HYDROCODONE BITARTRATE AND ACETAMINOPHEN 5; 325 MG/1; MG/1
1 TABLET ORAL EVERY 12 HOURS PRN
Qty: 60 TABLET | Refills: 0 | Status: SHIPPED | OUTPATIENT
Start: 2025-08-07

## 2025-08-07 RX ORDER — AMLODIPINE BESYLATE 5 MG/1
5 TABLET ORAL DAILY
Qty: 90 TABLET | Refills: 1 | Status: SHIPPED | OUTPATIENT
Start: 2025-08-07

## 2025-08-07 RX ORDER — PANTOPRAZOLE SODIUM 40 MG/1
40 TABLET, DELAYED RELEASE ORAL DAILY
Qty: 90 TABLET | Refills: 1 | Status: SHIPPED | OUTPATIENT
Start: 2025-08-07

## 2025-08-07 RX ORDER — PRAVASTATIN SODIUM 40 MG/1
40 TABLET ORAL DAILY
Qty: 90 TABLET | Refills: 3 | Status: SHIPPED | OUTPATIENT
Start: 2025-08-07 | End: 2026-02-03

## 2025-09-02 RX ORDER — DULOXETIN HYDROCHLORIDE 30 MG/1
60 CAPSULE, DELAYED RELEASE ORAL DAILY
Qty: 180 CAPSULE | Refills: 0 | Status: SHIPPED | OUTPATIENT
Start: 2025-09-02

## 2025-09-04 DIAGNOSIS — M54.50 LOW BACK PAIN, UNSPECIFIED BACK PAIN LATERALITY, UNSPECIFIED CHRONICITY, UNSPECIFIED WHETHER SCIATICA PRESENT: ICD-10-CM

## 2025-09-05 RX ORDER — HYDROCODONE BITARTRATE AND ACETAMINOPHEN 5; 325 MG/1; MG/1
1 TABLET ORAL EVERY 12 HOURS PRN
Qty: 60 TABLET | Refills: 0 | Status: SHIPPED | OUTPATIENT
Start: 2025-09-07

## (undated) DEVICE — IRRIGATOR ENDOSCOPY DISP.

## (undated) DEVICE — GAUZE SPONGE BULKEE 6X6.75IN

## (undated) DEVICE — APPLICATOR CHLORAPREP ORN 26ML

## (undated) DEVICE — TROCAR ENDOPATH XCEL 5X100MM

## (undated) DEVICE — PACK CUSTOM ENDO CHOLO SLI

## (undated) DEVICE — TROCAR KII BLLN 12MM 10CM

## (undated) DEVICE — SCISSOR CURVED ENDOPATH 5MM

## (undated) DEVICE — CLOSURE SKIN STERI STRIP 1/2X4

## (undated) DEVICE — GLOVE SURG ULTRA TOUCH 6

## (undated) DEVICE — COVER SURG LIGHT HANDLE

## (undated) DEVICE — UNDERGLOVE BIOGEL PI SZ 6.5 LF

## (undated) DEVICE — SEE MEDLINE ITEM 152678

## (undated) DEVICE — BANDAGE ADHESIVE

## (undated) DEVICE — TROCAR ENDOPATH XCEL 12X100MM

## (undated) DEVICE — DISSECTOR 5MM ENDOPATH

## (undated) DEVICE — SUT MONOCRYL 4-0 PS-2

## (undated) DEVICE — SYR 10CC LUER LOCK

## (undated) DEVICE — SEE MEDLINE ITEM 152680

## (undated) DEVICE — ELECTRODE REM PLYHSV RETURN 9

## (undated) DEVICE — NDL SPINAL SPINOCAN 22GX3.5

## (undated) DEVICE — KIT ANTIFOG

## (undated) DEVICE — SEE MEDLINE ITEM 146292

## (undated) DEVICE — SLEEVE SCD EXPRESS CALF MEDIUM

## (undated) DEVICE — BAG TISS RETRV MONARCH 10MM

## (undated) DEVICE — ALCOHOL 70% ISOP RUBBING 4OZ

## (undated) DEVICE — CANNULA ENDOPATH XCEL 5X100MM

## (undated) DEVICE — SUT 0 VICRYL / UR6 (J603)

## (undated) DEVICE — CLIP ENDO LIGATION LARGE CLIPS

## (undated) DEVICE — HEMOSTAT SURGICEL 4X8IN

## (undated) DEVICE — SEE MEDLINE ITEM 152622